# Patient Record
Sex: FEMALE | Race: WHITE | NOT HISPANIC OR LATINO | Employment: UNEMPLOYED | URBAN - METROPOLITAN AREA
[De-identification: names, ages, dates, MRNs, and addresses within clinical notes are randomized per-mention and may not be internally consistent; named-entity substitution may affect disease eponyms.]

---

## 2017-03-07 ENCOUNTER — CARE COORDINATION (OUTPATIENT)
Dept: CASE MANAGEMENT | Facility: CLINIC | Age: 22
End: 2017-03-07

## 2017-03-08 NOTE — PROGRESS NOTES
Clinic Care Coordination Contact  OUTREACH    Referral Information:  Referral Source: PCP  Reason for Contact: Initial, MH resources, OBGYN info   Care Conference: No     Universal Utilization:   ED Visits in last year: 0  Hospital visits in last year: 0  Last PCP appointment: 06/15/16  Missed Appointments: 3  Concerns: Proper Utilization  Multiple Providers or Specialists: NA    Clinical Concerns:  Current Medical Concerns:   Patient Active Problem List   Diagnosis     Exercise-induced asthma       Current Behavioral Concerns: NA    Education Provided to patient: [facility], [facility], [facility]BL[facility]IL[facility] Elias MD  Clinical Pathway: None    Medication Management:  Patient understands and is adherent-referral to psychiatry to manage ADHD     Functional Status:  Mobility Status: Independent  Equipment Currently Used at Home: none  Transportation: Pt was driving-DUI unsure of alternative transportation     Psychosocial:  Current living arrangement:: I live in a private home with family  Financial/Insurance: [email or web address]     Resources and Interventions:  Current Resources:  (NA);  (NA)  PAS Number:  (NA)  Senior Linkage Line Referral Placed:  (NA)  Advanced Care Plans/Directives on file:: Yes  Referrals Placed: Mental Health, Other (OBGYN)    Patient/Caregiver understanding: Pt was referred to care coordination to assist with finding a psychiatrist and OBGYN. Discussed ways to approach referrals and appointment set-up. Patient stated she was comfortable setting up the appointments independently provided the number for [facility] to see Dr. Dalila Elias, as indicated by PCP. Also, provided information for [facility] services if wanting to see a psychiatrist in the same clinic. Suggested patient utilize [facility] number to locate a psychiatrist. Pt expresses no additional needs at this time. Provided [facility] contact information and explained other services that CC  can help with.    Frequency of Care Coordination: as needed  Upcoming appointment:  (NA)     Plan: Pt denies any additional needs. NAHOMY CC will follow-up in 2-3 weeks to check on referral status.    Anabella Wood [facility]  Clinic Care Coordinator  [facility]/ [facility]  [phone]

## 2017-04-07 ENCOUNTER — CARE COORDINATION (OUTPATIENT)
Dept: CASE MANAGEMENT | Facility: CLINIC | Age: 22
End: 2017-04-07

## 2017-04-07 NOTE — PROGRESS NOTES
Clinic Care Coordination Contact    Check-in on provider referrals and appointment set-up. Pt reports that she has been super busy with work and hasn't had time to follow-up on the information provided. Pt has one more prescription refill for 4/17/17 and plans to follow-up with a psychiatrist after that. Encouraged pt to make an appointment sooner than later as it can take time to get in to see a psychiatrist. Pt stated understanding.  CC provided number and pt stated she woud call if she has any trouble or needs additional help.    Anabella Wood, \[facility]\""  Clinic Care Coordinator  [facility]/ [facility]  [phone]

## 2017-06-10 ENCOUNTER — HEALTH MAINTENANCE LETTER (OUTPATIENT)
Age: 22
End: 2017-06-10

## 2017-06-27 ENCOUNTER — OFFICE VISIT (OUTPATIENT)
Dept: MIDWIFE SERVICES | Facility: CLINIC | Age: 22
End: 2017-06-27
Payer: COMMERCIAL

## 2017-06-27 VITALS
WEIGHT: 137 LBS | BODY MASS INDEX: 22.82 KG/M2 | SYSTOLIC BLOOD PRESSURE: 116 MMHG | HEIGHT: 65 IN | DIASTOLIC BLOOD PRESSURE: 74 MMHG

## 2017-06-27 DIAGNOSIS — Z30.017 NEXPLANON INSERTION: Primary | ICD-10-CM

## 2017-06-27 DIAGNOSIS — Z30.46 NEXPLANON REMOVAL: ICD-10-CM

## 2017-06-27 DIAGNOSIS — Z11.8 SCREENING FOR CHLAMYDIAL DISEASE: ICD-10-CM

## 2017-06-27 DIAGNOSIS — F98.8 ATTENTION DEFICIT DISORDER, UNSPECIFIED HYPERACTIVITY PRESENCE: ICD-10-CM

## 2017-06-27 DIAGNOSIS — Z11.3 SCREENING EXAMINATION FOR VENEREAL DISEASE: ICD-10-CM

## 2017-06-27 DIAGNOSIS — Z32.02 PREGNANCY TEST NEGATIVE: ICD-10-CM

## 2017-06-27 DIAGNOSIS — Z01.812 PRE-PROCEDURE LAB EXAM: ICD-10-CM

## 2017-06-27 PROBLEM — Z86.59 H/O: DEPRESSION: Status: ACTIVE | Noted: 2017-06-27

## 2017-06-27 PROBLEM — F17.200 SMOKER: Status: ACTIVE | Noted: 2017-06-27

## 2017-06-27 LAB — BETA HCG QUAL IFA URINE: NEGATIVE

## 2017-06-27 PROCEDURE — 11983 REMOVE/INSERT DRUG IMPLANT: CPT | Performed by: ADVANCED PRACTICE MIDWIFE

## 2017-06-27 PROCEDURE — 87491 CHLMYD TRACH DNA AMP PROBE: CPT | Performed by: ADVANCED PRACTICE MIDWIFE

## 2017-06-27 PROCEDURE — 87591 N.GONORRHOEAE DNA AMP PROB: CPT | Performed by: ADVANCED PRACTICE MIDWIFE

## 2017-06-27 PROCEDURE — 84703 CHORIONIC GONADOTROPIN ASSAY: CPT | Performed by: ADVANCED PRACTICE MIDWIFE

## 2017-06-27 ASSESSMENT — PATIENT HEALTH QUESTIONNAIRE - PHQ9: 5. POOR APPETITE OR OVEREATING: SEVERAL DAYS

## 2017-06-27 ASSESSMENT — ANXIETY QUESTIONNAIRES
1. FEELING NERVOUS, ANXIOUS, OR ON EDGE: MORE THAN HALF THE DAYS
3. WORRYING TOO MUCH ABOUT DIFFERENT THINGS: MORE THAN HALF THE DAYS
2. NOT BEING ABLE TO STOP OR CONTROL WORRYING: SEVERAL DAYS
IF YOU CHECKED OFF ANY PROBLEMS ON THIS QUESTIONNAIRE, HOW DIFFICULT HAVE THESE PROBLEMS MADE IT FOR YOU TO DO YOUR WORK, TAKE CARE OF THINGS AT HOME, OR GET ALONG WITH OTHER PEOPLE: SOMEWHAT DIFFICULT
5. BEING SO RESTLESS THAT IT IS HARD TO SIT STILL: NOT AT ALL
GAD7 TOTAL SCORE: 9
7. FEELING AFRAID AS IF SOMETHING AWFUL MIGHT HAPPEN: SEVERAL DAYS
6. BECOMING EASILY ANNOYED OR IRRITABLE: MORE THAN HALF THE DAYS

## 2017-06-27 NOTE — MR AVS SNAPSHOT
"              After Visit Summary   6/27/2017    Alecia GIBSON Pauser    MRN: [id number]           Patient Information     Date Of Birth          1995        Visit Information        Provider Department      6/27/2017 10:00 AM Nora Ross APRN CNM [facility]        Today's Diagnoses     Nexplanon insertion    -  1    Nexplanon removal        Pre-procedure lab exam        Screening examination for venereal disease        Screening for chlamydial disease        Pregnancy test negative        Attention deficit disorder, unspecified hyperactivity presence           Follow-ups after your visit        Follow-up notes from your care team     Return in about 1 year (around 6/27/2018) for Routine Health Maintenance.      Who to contact     If you have questions or need follow up information about today's clinic visit or your schedule please contact [facility] directly at [phone].  Normal or non-critical lab and imaging results will be communicated to you by Gigi Hillhart, letter or phone within 4 business days after the clinic has received the results. If you do not hear from us within 7 days, please contact the clinic through MyChart or phone. If you have a critical or abnormal lab result, we will notify you by phone as soon as possible.  Submit refill requests through [employer] or call your pharmacy and they will forward the refill request to us. Please allow 3 business days for your refill to be completed.          Additional Information About Your Visit        MyChart Information     [employer] lets you send messages to your doctor, view your test results, renew your prescriptions, schedule appointments and more. To sign up, go to [email or web address] . Click on \"Log in\" on the left side of the screen, which will take you to the Welcome page. Then click on \"Sign up Now\" on the right side of the page.     You will be asked to enter the access code listed below, as " "well as some personal information. Please follow the directions to create your username and password.     Your access code is: [email or web address]  Expires: 2017  1:55 PM     Your access code will  in 90 days. If you need help or a new code, please call your [place] clinic or [phone].        Care EveryWhere ID     This is your Care EveryWhere ID. This could be used by other organizations to access your [place] medical records  [id number]        Your Vitals Were     Height BMI (Body Mass Index)                5' 4.5\" (1.638 m) 23.15 kg/m2           Blood Pressure from Last 3 Encounters:   17 116/74   06/17/15 110/72   14 102/74    Weight from Last 3 Encounters:   17 137 lb (62.1 kg)   06/17/15 127 lb (57.6 kg)   14 138 lb 12.8 oz (63 kg) (70 %)*     * Growth percentiles are based on [facility] 2-20 Years data.              We Performed the Following     Beta HCG qual IFA urine     Chlamydia trachomatis PCR     ETONOGESTREL IMPLANT SYSTEM     INSERTION NON-BIODEGRADABLE DRUG DELIVERY IMPLANT     Neisseria gonorrhoeae PCR     REMOVAL NON-BIODEGRADABLE DRUG DELIVERY IMPLANT          Today's Medication Changes          These changes are accurate as of: 17  1:55 PM.  If you have any questions, ask your nurse or doctor.               These medicines have changed or have updated prescriptions.        Dose/Directions    * etonogestrel 68 MG Impl   Commonly known as:  IMPLANON/NEXPLANON   This may have changed:  Another medication with the same name was added. Make sure you understand how and when to take each.   Changed by:  Tuan De Jesus MD        Dose:  1 each   1 each by Subdermal route once   Refills:  0       * etonogestrel 68 MG Impl   Commonly known as:  IMPLANON/NEXPLANON   This may have changed:  You were already taking a medication with the same name, and this prescription was added. Make sure you understand how and when to take each.   Used for:  Nexplanon insertion   Changed " by:  Nora Ross APRN CNM        Dose:  1 each   1 each (68 mg) by Subdermal route once for 1 dose   Quantity:  1 each   Refills:  0       * Notice:  This list has 2 medication(s) that are the same as other medications prescribed for you. Read the directions carefully, and ask your doctor or other care provider to review them with you.         Where to get your medicines      Some of these will need a paper prescription and others can be bought over the counter.  Ask your nurse if you have questions.     You don't need a prescription for these medications     etonogestrel 68 MG Impl                Primary Care Provider Office Phone # Fax #    Tuan De Jesus [phone]       [facility] [address]  [facility] [address]        Equal Access to Services     KELIN PATEL : Hadii aad ku hadasho Sotremayne, waaxda luqadaha, qaybta kaalmada adeegyada, zia prieto . So [facility] [phone].    ATENCIÓN: Si habla español, tiene a pereira disposición servicios gratuitos de asistencia lingüística. Llame al [phone].    We comply with applicable federal civil rights laws and [place] laws. We do not discriminate on the basis of race, color, national origin, age, disability sex, sexual orientation or gender identity.            Thank you!     Thank you for choosing [facility]  for your care. Our goal is always to provide you with excellent care. Hearing back from our patients is one way we can continue to improve our services. Please take a few minutes to complete the written survey that you may receive in the mail after your visit with us. Thank you!             Your Updated Medication List - Protect others around you: Learn how to safely use, store and throw away your medicines at www.disposemymeds.org.          This list is accurate as of: 6/27/17  1:55 PM.  Always use your most recent med list.                   Brand Name  Dispense Instructions for use Diagnosis    ADDERALL XR PO      Take 25 mg by mouth daily        * etonogestrel 68 MG Impl    IMPLANON/NEXPLANON     1 each by Subdermal route once        * etonogestrel 68 MG Impl    IMPLANON/NEXPLANON    1 each    1 each (68 mg) by Subdermal route once for 1 dose    Nexplanon insertion       MULTI-VITAMINS PO           * Notice:  This list has 2 medication(s) that are the same as other medications prescribed for you. Read the directions carefully, and ask your doctor or other care provider to review them with you.

## 2017-06-27 NOTE — PROGRESS NOTES
SUBJECTIVE:                                                   Alecia GIBSON Pauser is a 22 year old female who presents to clinic today for the following health issue(s):  Patient presents with:  Contraception: nexplanon removal and insertion. nexplanon  17      Additional information: patient had intercourse recently and didn't know her nexplanon was . Took neg upt at home a few days ago.     HPI:   Her current Nexplanon  2017, so she is doing a pregnancy test today which is negative.  Patient states her depression/anxiety is stable, has not gotten worse since being on Nexplanon.  Is not on meds, does take Adderall for ADD.  Is not in counseling, feels like she is coping well.  Works as a  and likes her job.  Continues to smoke cigs 1/2PPD    No LMP recorded. Patient has had an implant..   Patient is sexually active, No obstetric history on file..  Using nexplanon for contraception.    reports that she has been smoking Cigarettes.  She started smoking about 5 years ago. She has been smoking about 0.50 packs per day. She has never used smokeless tobacco.  Tobacco Cessation Action Plan: Information offered: Patient not interested at this time  STD testing offered?  Yes, accepts    Health maintenance updated:  yes  Today's PHQ-9 Score:   PHQ-9 SCORE 2017   Total Score -   Total Score 7     Today's IVON-7 Score:   IVON-7 SCORE 2017   Total Score 9       Problem list and histories reviewed & adjusted, as indicated.  Additional history: as documented.    Patient Active Problem List   Diagnosis     Exercise-induced asthma     No past surgical history on file.   Social History   Substance Use Topics     Smoking status: Current Every Day Smoker     Packs/day: 0.50     Types: Cigarettes     Start date: 2012     Smokeless tobacco: Never Used     Alcohol use 0.5 oz/week     1 Standard drinks or equivalent per week           Current Outpatient Prescriptions   Medication Sig  "    Multiple Vitamin (MULTI-VITAMINS PO)      etonogestrel (IMPLANON/NEXPLANON) 68 MG IMPL 1 each by Subdermal route once     Amphetamine-Dextroamphetamine (ADDERALL XR PO) Take 25 mg by mouth daily      No current facility-administered medications for this visit.      Allergies   Allergen Reactions     Seasonal Allergies        ROS:  12 point review of systems negative other than symptoms noted below.  Genitourinary: Irregular Menses and Vaginal Discharge  Skin: Acne and Rash    OBJECTIVE:     /74  Ht 5' 4.5\" (1.638 m)  Wt 137 lb (62.1 kg)  BMI 23.15 kg/m2  Body mass index is 23.15 kg/(m^2).    Exam:  Constitutional:  Appearance: Well nourished, well developed alert, in no acute distress     In-Clinic Test Results:  No results found for this or any previous visit (from the past 24 hour(s)).    ASSESSMENT/PLAN:                                                        ICD-10-CM    1. Nexplanon insertion Z30.017 etonogestrel (IMPLANON/NEXPLANON) 68 MG IMPL     INSERTION NON-BIODEGRADABLE DRUG DELIVERY IMPLANT     ETONOGESTREL IMPLANT SYSTEM   2. Nexplanon removal Z30.46 REMOVAL NON-BIODEGRADABLE DRUG DELIVERY IMPLANT       INDICATIONS:                                                      Alecia is here today for removal of Nexplanon contraceptive implant.     Is a pregnancy test required: Yes.  Was it positive or negative?  Negative  Was a consent obtained?  Yes      PROCEDURE:                                                      Patient was placed in dorsal supine position with left arm abducted and externally rotated. Nexplanon was palpated under skin. The area was cleansed with betadine. The distal site was injected with 3 ml of 1% plain lidocaine with epi.  While pushing down on the proximal end, 2 mm incision was made over the distal implant with a scalpel. The implant was grasped with a mosquito forceps and removed intact. The skin was closed with Steristrip. A pressure bandage was placed for the next " 12-24 hours.  She tolerated the procedure well with minimal discomfort. There were no complications. Patient was discharged in stable condition.    Call if bleeding, pain or fever occur.    SHAWN Camejo CNM      INDICATIONS:                                                      Patient presents for placement of Nexplanon for contraception.  She has had been counseled on side effects, risks, benefits and alternatives.  Patient desires to proceed.    Verification of Procedure:  Just before the procedure begans through verbal and active participation of team members, I verified:     Initials   Patient Name KM   Patient  KM   Procedure to be performed KM     Consent:  Risks, benefits of treatment, and alternative options for contraception were discussed.  Patient's questions were elicited and answered.  Written consent was obtained and scanned into medical record.       PROCEDURE:                                                      Patient was resting comfortably on exam table, left arm placed at shoulder level in a 90 degree angle.    Site from removal used for insertion.  Site cleaned again with betadine.  Nexplanon device visualized in applicator by patient and provider.  Skin punctured with applicator at insertion site and advanced with ease in the subdermal space.  Applicator was removed.  Nexplanon was palpated by provider and patient.    Small amount of bleeding noted at insertion site and no bruising noted along track of Nexplanon.  Bandage and pressure dressing applied to insertion site.         POST PROCEDURE:                                                      To be removed/replaced within three years. Written and verbal instructions provided to patient.  She tolerated the procedure well with minimal discomfort. There were no complications. Patient was discharged in stable condition.    Keep dressing on and dry for 24 hours, then remove wrap.  Replace bandaid daily for 5 days. Keep your user  card in a safe place where you'll remember it.  Make note of today's date for removal/replacement of your Nexplanon in 3 years. Call us if there is any redness, tenderness, warmth or drainage from the area of your Nexplanon insertion. Use a backup method of birth control for 7 days.    NDC 2510-7251-92  LOT [id number]  EXP 8/2019    Patient encouraged to continue to work on quitting smoking.    SHAWN Camejo CNM

## 2017-06-28 LAB
C TRACH DNA SPEC QL NAA+PROBE: NORMAL
N GONORRHOEA DNA SPEC QL NAA+PROBE: NORMAL
SPECIMEN SOURCE: NORMAL
SPECIMEN SOURCE: NORMAL

## 2017-06-28 ASSESSMENT — PATIENT HEALTH QUESTIONNAIRE - PHQ9: SUM OF ALL RESPONSES TO PHQ QUESTIONS 1-9: 7

## 2017-06-28 ASSESSMENT — ANXIETY QUESTIONNAIRES: GAD7 TOTAL SCORE: 9

## 2018-05-07 ENCOUNTER — OFFICE VISIT - HEALTHEAST (OUTPATIENT)
Dept: FAMILY MEDICINE | Facility: CLINIC | Age: 23
End: 2018-05-07

## 2018-05-07 DIAGNOSIS — F90.9 ATTENTION DEFICIT HYPERACTIVITY DISORDER: ICD-10-CM

## 2018-05-07 DIAGNOSIS — F41.1 GENERALIZED ANXIETY DISORDER: ICD-10-CM

## 2018-05-07 ASSESSMENT — MIFFLIN-ST. JEOR: SCORE: 1366.27

## 2018-07-02 ENCOUNTER — COMMUNICATION - HEALTHEAST (OUTPATIENT)
Dept: FAMILY MEDICINE | Facility: CLINIC | Age: 23
End: 2018-07-02

## 2018-07-02 ENCOUNTER — COMMUNICATION - HEALTHEAST (OUTPATIENT)
Dept: SCHEDULING | Facility: CLINIC | Age: 23
End: 2018-07-02

## 2018-07-26 ENCOUNTER — OFFICE VISIT - HEALTHEAST (OUTPATIENT)
Dept: FAMILY MEDICINE | Facility: CLINIC | Age: 23
End: 2018-07-26

## 2018-07-26 DIAGNOSIS — L70.9 ACNE: ICD-10-CM

## 2018-07-26 DIAGNOSIS — F41.9 ANXIETY: ICD-10-CM

## 2018-07-26 DIAGNOSIS — Z12.4 SCREENING FOR CERVICAL CANCER: ICD-10-CM

## 2018-07-26 DIAGNOSIS — Z00.00 ROUTINE GENERAL MEDICAL EXAMINATION AT A HEALTH CARE FACILITY: ICD-10-CM

## 2018-07-26 DIAGNOSIS — F90.9 ADHD (ATTENTION DEFICIT HYPERACTIVITY DISORDER): ICD-10-CM

## 2018-08-29 ENCOUNTER — COMMUNICATION - HEALTHEAST (OUTPATIENT)
Dept: FAMILY MEDICINE | Facility: CLINIC | Age: 23
End: 2018-08-29

## 2018-10-22 ENCOUNTER — COMMUNICATION - HEALTHEAST (OUTPATIENT)
Dept: FAMILY MEDICINE | Facility: CLINIC | Age: 23
End: 2018-10-22

## 2018-10-23 ENCOUNTER — COMMUNICATION - HEALTHEAST (OUTPATIENT)
Dept: FAMILY MEDICINE | Facility: CLINIC | Age: 23
End: 2018-10-23

## 2018-10-23 DIAGNOSIS — F90.9 ADHD (ATTENTION DEFICIT HYPERACTIVITY DISORDER): ICD-10-CM

## 2018-12-27 ENCOUNTER — OFFICE VISIT - HEALTHEAST (OUTPATIENT)
Dept: FAMILY MEDICINE | Facility: CLINIC | Age: 23
End: 2018-12-27

## 2018-12-27 DIAGNOSIS — L70.9 ACNE: ICD-10-CM

## 2018-12-27 DIAGNOSIS — F41.9 ANXIETY: ICD-10-CM

## 2018-12-27 DIAGNOSIS — F90.9 ADHD (ATTENTION DEFICIT HYPERACTIVITY DISORDER): ICD-10-CM

## 2018-12-27 LAB
AMPHETAMINES UR QL SCN: ABNORMAL
BARBITURATES UR QL: ABNORMAL
BENZODIAZ UR QL: ABNORMAL
CANNABINOIDS UR QL SCN: ABNORMAL
COCAINE UR QL: ABNORMAL
CREAT UR-MCNC: 137.2 MG/DL
METHADONE UR QL SCN: ABNORMAL
OPIATES UR QL SCN: ABNORMAL
OXYCODONE UR QL: ABNORMAL
PCP UR QL SCN: ABNORMAL

## 2018-12-31 ENCOUNTER — COMMUNICATION - HEALTHEAST (OUTPATIENT)
Dept: FAMILY MEDICINE | Facility: CLINIC | Age: 23
End: 2018-12-31

## 2018-12-31 DIAGNOSIS — L70.0 ACNE VULGARIS: ICD-10-CM

## 2019-04-22 ENCOUNTER — COMMUNICATION - HEALTHEAST (OUTPATIENT)
Dept: FAMILY MEDICINE | Facility: CLINIC | Age: 24
End: 2019-04-22

## 2019-04-22 DIAGNOSIS — F90.9 ADHD (ATTENTION DEFICIT HYPERACTIVITY DISORDER): ICD-10-CM

## 2019-06-11 ENCOUNTER — COMMUNICATION - HEALTHEAST (OUTPATIENT)
Dept: FAMILY MEDICINE | Facility: CLINIC | Age: 24
End: 2019-06-11

## 2019-06-13 ENCOUNTER — COMMUNICATION - HEALTHEAST (OUTPATIENT)
Dept: FAMILY MEDICINE | Facility: CLINIC | Age: 24
End: 2019-06-13

## 2019-06-18 ENCOUNTER — COMMUNICATION - HEALTHEAST (OUTPATIENT)
Dept: FAMILY MEDICINE | Facility: CLINIC | Age: 24
End: 2019-06-18

## 2019-06-21 ENCOUNTER — COMMUNICATION - HEALTHEAST (OUTPATIENT)
Dept: FAMILY MEDICINE | Facility: CLINIC | Age: 24
End: 2019-06-21

## 2019-06-21 DIAGNOSIS — L70.9 ACNE: ICD-10-CM

## 2019-06-25 ENCOUNTER — OFFICE VISIT - HEALTHEAST (OUTPATIENT)
Dept: FAMILY MEDICINE | Facility: CLINIC | Age: 24
End: 2019-06-25

## 2019-06-25 DIAGNOSIS — Z30.09 GENERAL COUNSELLING AND ADVICE ON CONTRACEPTION: ICD-10-CM

## 2019-06-25 DIAGNOSIS — F90.9 ADHD (ATTENTION DEFICIT HYPERACTIVITY DISORDER): ICD-10-CM

## 2019-06-25 ASSESSMENT — MIFFLIN-ST. JEOR: SCORE: 1358.33

## 2019-10-17 ENCOUNTER — COMMUNICATION - HEALTHEAST (OUTPATIENT)
Dept: FAMILY MEDICINE | Facility: CLINIC | Age: 24
End: 2019-10-17

## 2019-10-17 DIAGNOSIS — F90.9 ADHD (ATTENTION DEFICIT HYPERACTIVITY DISORDER): ICD-10-CM

## 2019-11-08 ENCOUNTER — COMMUNICATION - HEALTHEAST (OUTPATIENT)
Dept: FAMILY MEDICINE | Facility: CLINIC | Age: 24
End: 2019-11-08

## 2019-11-11 ENCOUNTER — COMMUNICATION - HEALTHEAST (OUTPATIENT)
Dept: FAMILY MEDICINE | Facility: CLINIC | Age: 24
End: 2019-11-11

## 2019-11-16 ENCOUNTER — COMMUNICATION - HEALTHEAST (OUTPATIENT)
Dept: FAMILY MEDICINE | Facility: CLINIC | Age: 24
End: 2019-11-16

## 2019-11-16 DIAGNOSIS — L70.9 ACNE: ICD-10-CM

## 2019-11-18 ENCOUNTER — COMMUNICATION - HEALTHEAST (OUTPATIENT)
Dept: SCHEDULING | Facility: CLINIC | Age: 24
End: 2019-11-18

## 2019-11-18 ENCOUNTER — COMMUNICATION - HEALTHEAST (OUTPATIENT)
Dept: FAMILY MEDICINE | Facility: CLINIC | Age: 24
End: 2019-11-18

## 2019-11-18 DIAGNOSIS — F90.9 ADHD (ATTENTION DEFICIT HYPERACTIVITY DISORDER): ICD-10-CM

## 2019-12-05 ENCOUNTER — COMMUNICATION - HEALTHEAST (OUTPATIENT)
Dept: FAMILY MEDICINE | Facility: CLINIC | Age: 24
End: 2019-12-05

## 2019-12-12 ENCOUNTER — OFFICE VISIT - HEALTHEAST (OUTPATIENT)
Dept: OBGYN | Facility: CLINIC | Age: 24
End: 2019-12-12

## 2019-12-12 ENCOUNTER — OFFICE VISIT - HEALTHEAST (OUTPATIENT)
Dept: FAMILY MEDICINE | Facility: CLINIC | Age: 24
End: 2019-12-12

## 2019-12-12 ENCOUNTER — COMMUNICATION - HEALTHEAST (OUTPATIENT)
Dept: FAMILY MEDICINE | Facility: CLINIC | Age: 24
End: 2019-12-12

## 2019-12-12 DIAGNOSIS — J01.00 ACUTE NON-RECURRENT MAXILLARY SINUSITIS: ICD-10-CM

## 2019-12-12 DIAGNOSIS — Z30.09 ENCOUNTER FOR OTHER GENERAL COUNSELING OR ADVICE ON CONTRACEPTION: ICD-10-CM

## 2019-12-12 DIAGNOSIS — Z30.46 ENCOUNTER FOR NEXPLANON REMOVAL: ICD-10-CM

## 2019-12-12 DIAGNOSIS — F41.1 GAD (GENERALIZED ANXIETY DISORDER): ICD-10-CM

## 2019-12-12 DIAGNOSIS — F90.9 ADHD (ATTENTION DEFICIT HYPERACTIVITY DISORDER): ICD-10-CM

## 2019-12-12 ASSESSMENT — ANXIETY QUESTIONNAIRES
1. FEELING NERVOUS, ANXIOUS, OR ON EDGE: MORE THAN HALF THE DAYS
IF YOU CHECKED OFF ANY PROBLEMS ON THIS QUESTIONNAIRE, HOW DIFFICULT HAVE THESE PROBLEMS MADE IT FOR YOU TO DO YOUR WORK, TAKE CARE OF THINGS AT HOME, OR GET ALONG WITH OTHER PEOPLE: SOMEWHAT DIFFICULT
5. BEING SO RESTLESS THAT IT IS HARD TO SIT STILL: SEVERAL DAYS
2. NOT BEING ABLE TO STOP OR CONTROL WORRYING: NEARLY EVERY DAY
6. BECOMING EASILY ANNOYED OR IRRITABLE: MORE THAN HALF THE DAYS
3. WORRYING TOO MUCH ABOUT DIFFERENT THINGS: NEARLY EVERY DAY
4. TROUBLE RELAXING: MORE THAN HALF THE DAYS
GAD7 TOTAL SCORE: 15
7. FEELING AFRAID AS IF SOMETHING AWFUL MIGHT HAPPEN: MORE THAN HALF THE DAYS

## 2019-12-12 ASSESSMENT — MIFFLIN-ST. JEOR: SCORE: 1382.14

## 2019-12-12 ASSESSMENT — PATIENT HEALTH QUESTIONNAIRE - PHQ9: SUM OF ALL RESPONSES TO PHQ QUESTIONS 1-9: 14

## 2020-01-16 ENCOUNTER — OFFICE VISIT - HEALTHEAST (OUTPATIENT)
Dept: FAMILY MEDICINE | Facility: CLINIC | Age: 25
End: 2020-01-16

## 2020-01-16 DIAGNOSIS — F41.1 GAD (GENERALIZED ANXIETY DISORDER): ICD-10-CM

## 2020-01-16 DIAGNOSIS — L70.9 ACNE: ICD-10-CM

## 2020-01-16 DIAGNOSIS — F90.0 ATTENTION DEFICIT HYPERACTIVITY DISORDER (ADHD), PREDOMINANTLY INATTENTIVE TYPE: ICD-10-CM

## 2020-01-16 LAB
AMPHETAMINES UR QL SCN: ABNORMAL
BARBITURATES UR QL: ABNORMAL
BENZODIAZ UR QL: ABNORMAL
CANNABINOIDS UR QL SCN: ABNORMAL
COCAINE UR QL: ABNORMAL
CREAT UR-MCNC: 164.7 MG/DL
METHADONE UR QL SCN: ABNORMAL
OPIATES UR QL SCN: ABNORMAL
OXYCODONE UR QL: ABNORMAL
PCP UR QL SCN: ABNORMAL

## 2020-01-16 ASSESSMENT — ANXIETY QUESTIONNAIRES
2. NOT BEING ABLE TO STOP OR CONTROL WORRYING: SEVERAL DAYS
1. FEELING NERVOUS, ANXIOUS, OR ON EDGE: SEVERAL DAYS
3. WORRYING TOO MUCH ABOUT DIFFERENT THINGS: MORE THAN HALF THE DAYS
5. BEING SO RESTLESS THAT IT IS HARD TO SIT STILL: SEVERAL DAYS
IF YOU CHECKED OFF ANY PROBLEMS ON THIS QUESTIONNAIRE, HOW DIFFICULT HAVE THESE PROBLEMS MADE IT FOR YOU TO DO YOUR WORK, TAKE CARE OF THINGS AT HOME, OR GET ALONG WITH OTHER PEOPLE: SOMEWHAT DIFFICULT
6. BECOMING EASILY ANNOYED OR IRRITABLE: NOT AT ALL
7. FEELING AFRAID AS IF SOMETHING AWFUL MIGHT HAPPEN: NOT AT ALL
GAD7 TOTAL SCORE: 6
4. TROUBLE RELAXING: SEVERAL DAYS

## 2020-01-16 ASSESSMENT — PATIENT HEALTH QUESTIONNAIRE - PHQ9: SUM OF ALL RESPONSES TO PHQ QUESTIONS 1-9: 10

## 2020-01-16 ASSESSMENT — MIFFLIN-ST. JEOR: SCORE: 1388.95

## 2020-01-21 LAB
AMPHET UR-MCNC: 769 NG/ML
MDA UR-MCNC: <200 NG/ML
MDEA UR-MCNC: <200 NG/ML
MDMA UR-MCNC: <200 NG/ML
METHAMPHET UR-MCNC: <200 NG/ML
PHENTERMINE UR CFM-MCNC: <200 NG/ML

## 2020-03-02 ENCOUNTER — COMMUNICATION - HEALTHEAST (OUTPATIENT)
Dept: FAMILY MEDICINE | Facility: CLINIC | Age: 25
End: 2020-03-02

## 2020-05-07 ENCOUNTER — OFFICE VISIT - HEALTHEAST (OUTPATIENT)
Dept: FAMILY MEDICINE | Facility: CLINIC | Age: 25
End: 2020-05-07

## 2020-05-07 ENCOUNTER — COMMUNICATION - HEALTHEAST (OUTPATIENT)
Dept: FAMILY MEDICINE | Facility: CLINIC | Age: 25
End: 2020-05-07

## 2020-05-07 DIAGNOSIS — F90.0 ATTENTION DEFICIT HYPERACTIVITY DISORDER (ADHD), PREDOMINANTLY INATTENTIVE TYPE: ICD-10-CM

## 2020-05-07 DIAGNOSIS — F41.9 ANXIETY: ICD-10-CM

## 2020-05-07 ASSESSMENT — ANXIETY QUESTIONNAIRES
6. BECOMING EASILY ANNOYED OR IRRITABLE: SEVERAL DAYS
7. FEELING AFRAID AS IF SOMETHING AWFUL MIGHT HAPPEN: SEVERAL DAYS
2. NOT BEING ABLE TO STOP OR CONTROL WORRYING: NOT AT ALL
1. FEELING NERVOUS, ANXIOUS, OR ON EDGE: SEVERAL DAYS
4. TROUBLE RELAXING: SEVERAL DAYS
5. BEING SO RESTLESS THAT IT IS HARD TO SIT STILL: MORE THAN HALF THE DAYS
GAD7 TOTAL SCORE: 8
3. WORRYING TOO MUCH ABOUT DIFFERENT THINGS: MORE THAN HALF THE DAYS

## 2020-05-07 ASSESSMENT — PATIENT HEALTH QUESTIONNAIRE - PHQ9: SUM OF ALL RESPONSES TO PHQ QUESTIONS 1-9: 6

## 2020-08-11 ENCOUNTER — COMMUNICATION - HEALTHEAST (OUTPATIENT)
Dept: FAMILY MEDICINE | Facility: CLINIC | Age: 25
End: 2020-08-11

## 2020-08-11 DIAGNOSIS — F90.0 ATTENTION DEFICIT HYPERACTIVITY DISORDER (ADHD), PREDOMINANTLY INATTENTIVE TYPE: ICD-10-CM

## 2020-09-08 ENCOUNTER — COMMUNICATION - HEALTHEAST (OUTPATIENT)
Dept: SCHEDULING | Facility: CLINIC | Age: 25
End: 2020-09-08

## 2020-09-08 DIAGNOSIS — F90.0 ATTENTION DEFICIT HYPERACTIVITY DISORDER (ADHD), PREDOMINANTLY INATTENTIVE TYPE: ICD-10-CM

## 2020-09-09 ENCOUNTER — COMMUNICATION - HEALTHEAST (OUTPATIENT)
Dept: FAMILY MEDICINE | Facility: CLINIC | Age: 25
End: 2020-09-09

## 2020-09-09 DIAGNOSIS — F90.0 ATTENTION DEFICIT HYPERACTIVITY DISORDER (ADHD), PREDOMINANTLY INATTENTIVE TYPE: ICD-10-CM

## 2020-09-11 ENCOUNTER — COMMUNICATION - HEALTHEAST (OUTPATIENT)
Dept: FAMILY MEDICINE | Facility: CLINIC | Age: 25
End: 2020-09-11

## 2020-10-19 ENCOUNTER — COMMUNICATION - HEALTHEAST (OUTPATIENT)
Dept: FAMILY MEDICINE | Facility: CLINIC | Age: 25
End: 2020-10-19

## 2020-10-19 DIAGNOSIS — F90.0 ATTENTION DEFICIT HYPERACTIVITY DISORDER (ADHD), PREDOMINANTLY INATTENTIVE TYPE: ICD-10-CM

## 2020-11-19 ENCOUNTER — COMMUNICATION - HEALTHEAST (OUTPATIENT)
Dept: FAMILY MEDICINE | Facility: CLINIC | Age: 25
End: 2020-11-19

## 2020-11-19 DIAGNOSIS — F90.0 ATTENTION DEFICIT HYPERACTIVITY DISORDER (ADHD), PREDOMINANTLY INATTENTIVE TYPE: ICD-10-CM

## 2020-12-14 ENCOUNTER — OFFICE VISIT - HEALTHEAST (OUTPATIENT)
Dept: FAMILY MEDICINE | Facility: CLINIC | Age: 25
End: 2020-12-14

## 2020-12-14 DIAGNOSIS — F90.0 ATTENTION DEFICIT HYPERACTIVITY DISORDER (ADHD), PREDOMINANTLY INATTENTIVE TYPE: ICD-10-CM

## 2020-12-14 DIAGNOSIS — F41.9 ANXIETY: ICD-10-CM

## 2020-12-14 DIAGNOSIS — Z30.09 GENERAL COUNSELING FOR PRESCRIPTION OF ORAL CONTRACEPTIVES: ICD-10-CM

## 2021-03-25 ENCOUNTER — COMMUNICATION - HEALTHEAST (OUTPATIENT)
Dept: ADMINISTRATIVE | Facility: CLINIC | Age: 26
End: 2021-03-25

## 2021-03-25 DIAGNOSIS — F90.0 ATTENTION DEFICIT HYPERACTIVITY DISORDER (ADHD), PREDOMINANTLY INATTENTIVE TYPE: ICD-10-CM

## 2021-04-20 ENCOUNTER — OFFICE VISIT - HEALTHEAST (OUTPATIENT)
Dept: FAMILY MEDICINE | Facility: CLINIC | Age: 26
End: 2021-04-20

## 2021-04-20 ENCOUNTER — RECORDS - HEALTHEAST (OUTPATIENT)
Dept: GENERAL RADIOLOGY | Facility: CLINIC | Age: 26
End: 2021-04-20

## 2021-04-20 DIAGNOSIS — M89.8X1 CLAVICLE PAIN: ICD-10-CM

## 2021-04-20 DIAGNOSIS — R59.1 LYMPHADENOPATHY: ICD-10-CM

## 2021-04-20 DIAGNOSIS — M89.8X1 OTHER SPECIFIED DISORDERS OF BONE, SHOULDER: ICD-10-CM

## 2021-04-20 DIAGNOSIS — T50.Z95A VACCINE REACTION, INITIAL ENCOUNTER: ICD-10-CM

## 2021-04-20 ASSESSMENT — MIFFLIN-ST. JEOR: SCORE: 1488.18

## 2021-04-22 ENCOUNTER — COMMUNICATION - HEALTHEAST (OUTPATIENT)
Dept: FAMILY MEDICINE | Facility: CLINIC | Age: 26
End: 2021-04-22

## 2021-05-24 ENCOUNTER — OFFICE VISIT - HEALTHEAST (OUTPATIENT)
Dept: FAMILY MEDICINE | Facility: CLINIC | Age: 26
End: 2021-05-24

## 2021-05-24 DIAGNOSIS — N39.41 URGE INCONTINENCE OF URINE: ICD-10-CM

## 2021-05-24 DIAGNOSIS — F90.0 ATTENTION DEFICIT HYPERACTIVITY DISORDER (ADHD), PREDOMINANTLY INATTENTIVE TYPE: ICD-10-CM

## 2021-05-24 DIAGNOSIS — Z31.41 FERTILITY TESTING: ICD-10-CM

## 2021-05-24 DIAGNOSIS — Z72.0 TOBACCO ABUSE: ICD-10-CM

## 2021-05-24 LAB
ALBUMIN UR-MCNC: NEGATIVE G/DL
APPEARANCE UR: CLEAR
BACTERIA #/AREA URNS HPF: NORMAL /[HPF]
BILIRUB UR QL STRIP: NEGATIVE
COLOR UR AUTO: YELLOW
GLUCOSE UR STRIP-MCNC: NEGATIVE MG/DL
HGB UR QL STRIP: NEGATIVE
KETONES UR STRIP-MCNC: NEGATIVE MG/DL
LEUKOCYTE ESTERASE UR QL STRIP: NEGATIVE
NITRATE UR QL: NEGATIVE
PH UR STRIP: 5.5 [PH] (ref 5–8)
RBC #/AREA URNS AUTO: NORMAL HPF
SP GR UR STRIP: 1.01 (ref 1–1.03)
SQUAMOUS #/AREA URNS AUTO: NORMAL LPF
UROBILINOGEN UR STRIP-ACNC: NORMAL
WBC #/AREA URNS AUTO: NORMAL HPF

## 2021-05-24 ASSESSMENT — ANXIETY QUESTIONNAIRES
2. NOT BEING ABLE TO STOP OR CONTROL WORRYING: SEVERAL DAYS
1. FEELING NERVOUS, ANXIOUS, OR ON EDGE: SEVERAL DAYS
4. TROUBLE RELAXING: NOT AT ALL
6. BECOMING EASILY ANNOYED OR IRRITABLE: SEVERAL DAYS
7. FEELING AFRAID AS IF SOMETHING AWFUL MIGHT HAPPEN: NOT AT ALL
5. BEING SO RESTLESS THAT IT IS HARD TO SIT STILL: NOT AT ALL
3. WORRYING TOO MUCH ABOUT DIFFERENT THINGS: SEVERAL DAYS
GAD7 TOTAL SCORE: 4
IF YOU CHECKED OFF ANY PROBLEMS ON THIS QUESTIONNAIRE, HOW DIFFICULT HAVE THESE PROBLEMS MADE IT FOR YOU TO DO YOUR WORK, TAKE CARE OF THINGS AT HOME, OR GET ALONG WITH OTHER PEOPLE: NOT DIFFICULT AT ALL

## 2021-05-24 ASSESSMENT — PATIENT HEALTH QUESTIONNAIRE - PHQ9: SUM OF ALL RESPONSES TO PHQ QUESTIONS 1-9: 3

## 2021-05-24 ASSESSMENT — MIFFLIN-ST. JEOR: SCORE: 1473.44

## 2021-05-25 LAB
AMPHETAMINES UR QL SCN: ABNORMAL
BARBITURATES UR QL: ABNORMAL
BENZODIAZ UR QL: ABNORMAL
CANNABINOIDS UR QL SCN: ABNORMAL
COCAINE UR QL: ABNORMAL
CREAT UR-MCNC: 34.1 MG/DL
METHADONE UR QL SCN: ABNORMAL
OPIATES UR QL SCN: ABNORMAL
OXYCODONE UR QL: ABNORMAL
PCP UR QL SCN: ABNORMAL

## 2021-05-26 ASSESSMENT — PATIENT HEALTH QUESTIONNAIRE - PHQ9: SUM OF ALL RESPONSES TO PHQ QUESTIONS 1-9: 14

## 2021-05-27 ASSESSMENT — PATIENT HEALTH QUESTIONNAIRE - PHQ9
SUM OF ALL RESPONSES TO PHQ QUESTIONS 1-9: 6
SUM OF ALL RESPONSES TO PHQ QUESTIONS 1-9: 10

## 2021-05-28 ASSESSMENT — ANXIETY QUESTIONNAIRES
GAD7 TOTAL SCORE: 15
GAD7 TOTAL SCORE: 6
GAD7 TOTAL SCORE: 8

## 2021-05-28 NOTE — TELEPHONE ENCOUNTER
Pt requesting refill. Will you fill the Adderall XR 30 mg? Last CSA was on 7/26/18.  Due for a visit in June. Last OFV was in 12/2018.

## 2021-05-29 NOTE — TELEPHONE ENCOUNTER
RN cannot approve Refill Request    RN can NOT refill this medication med is not covered by policy/route to provider. Last office visit: 12/27/2018 Deepti Swan CNP Last Physical: Visit date not found Last MTM visit: Visit date not found Last visit same specialty: 12/27/2018 Deepti Swan CNP.  Next visit within 3 mo: Visit date not found  Next physical within 3 mo: Visit date not found      Angeli Almaraz, Care Connection Triage/Med Refill 6/23/2019    Requested Prescriptions   Pending Prescriptions Disp Refills     clindamycin (CLEOCIN T) 1 % lotion [Pharmacy Med Name: CLINDAMYCIN 1% LOTION 60ML]  0     Sig: APPLY TOPICALLY TO THE FACE EVERY MORNING       There is no refill protocol information for this order

## 2021-05-30 NOTE — PROGRESS NOTES
Assessment & Plan   1. ADHD (attention deficit hyperactivity disorder)  Inattention symptoms remain well controlled on Adderall XR 30 mg.  Medication refilled for 3 months I will plan to recheck in 6 months.  CSA updated today.  Urine drug screen is up-to-date as well.  - dextroamphetamine-amphetamine (ADDERALL XR) 30 MG 24 hr capsule; Take 1 capsule (30 mg total) by mouth daily.  Dispense: 30 capsule; Refill: 0  - dextroamphetamine-amphetamine (ADDERALL XR) 30 MG 24 hr capsule; Take 1 capsule (30 mg total) by mouth daily.  Dispense: 30 capsule; Refill: 0  - dextroamphetamine-amphetamine (ADDERALL XR) 30 MG 24 hr capsule; Take 1 capsule (30 mg total) by mouth daily.  Dispense: 30 capsule; Refill: 0    2. General counselling and advice on contraception  Discussed contraceptive options for her and specifically Depo and IUDs that she has not tolerated combined oral contraceptives in the past.  She is intending to remove the Nexplanon at some point within the next year.  Specifically interested in Depo and advised that she could start this on the day of the Nexplanon removal.  Counseled on potential side effects and risks.  She will contact me if she has any further questions.    Deepti Swan CNP    Subjective   Chief Complaint:  ADHD and Contraception (would like to discuss different birth control options for once she gets her nexplanon removed)    HPI:   Alecia GIBSON Pauser is a 24 y.o. female who presents for ADHD and contraception.      ADHD:  Continues on Adderall XR 30mg.  She continues to feel this works well for her to control her inattention symptoms.  New job upcoming working for [employer]. she is quite excited about this.  H/o anxiety as well.  She feels this continues to be well controlled without medication.    Contraception: Currently Nexplanon in place.  States this was placed 2 years ago.  Initially she had a favorable bleeding pattern that is now experiencing episodic bleeding without pattern.  She is also  "noticed some bloating and weight gain.  She is considering changing contraceptive methods and would like to discuss other options.  Previously with OCP she also experienced bloating and weight gain.  Also had difficulty taking on a daily basis.    Smoking cessation:  She has now quit smoking for almost three months! Using e-cigarette. Rare cravings.     Allergies:  has No Known Allergies.    SH/FH:  Social History and Family History reviewed and updated.   Tobacco Status:  She  reports that she quit smoking about 2 months ago. Her smoking use included cigarettes. She has never used smokeless tobacco.    Review of Systems:  A complete head to toe ROS is negative unless otherwise noted in HPI    Objective     Vitals:    06/25/19 1342   BP: 120/72   Patient Site: Right Arm   Patient Position: Sitting   Cuff Size: Adult Regular   Pulse: 68   Weight: 135 lb 4 oz (61.3 kg)   Height: 5' 5.25\" (1.657 m)       Physical Exam:  GENERAL: Alert, well-appearing female .   PSYCH: Pleasant mood, affect appropriate.  Good judgment and insight.  Intact recent and remote memory.  Good eye contact.    "

## 2021-06-01 VITALS — HEIGHT: 65 IN | BODY MASS INDEX: 22.82 KG/M2 | WEIGHT: 137 LBS

## 2021-06-01 VITALS — BODY MASS INDEX: 22.05 KG/M2 | WEIGHT: 133.5 LBS

## 2021-06-02 VITALS — BODY MASS INDEX: 22.79 KG/M2 | WEIGHT: 138 LBS

## 2021-06-02 NOTE — TELEPHONE ENCOUNTER
Controlled Substance Refill Request  Medication Name:   Requested Prescriptions     Pending Prescriptions Disp Refills     dextroamphetamine-amphetamine (ADDERALL XR) 30 MG 24 hr capsule 30 capsule 0     Sig: Take 1 capsule (30 mg total) by mouth daily.     Date Last Fill: 9/19/19  Pharmacy: [id number]      Submit electronically to pharmacy  Controlled Substance Agreement Date Scanned:   Encounter-Level CSA Scan Date:    There are no encounter-level csa scan date.       Last office visit with prescriber/PCP: 6/25/2019 Deepti Swan CNP OR same dept: 6/25/2019 Deepti Swan CNP OR same specialty: 6/25/2019 Deepti Swan CNP  Last physical: Visit date not found Last MTM visit: Visit date not found          Patient stated that she has enough to last until Saturday.

## 2021-06-03 VITALS
OXYGEN SATURATION: 99 % | TEMPERATURE: 97.7 F | DIASTOLIC BLOOD PRESSURE: 80 MMHG | HEIGHT: 65 IN | HEART RATE: 89 BPM | SYSTOLIC BLOOD PRESSURE: 118 MMHG | WEIGHT: 140.5 LBS | BODY MASS INDEX: 23.41 KG/M2

## 2021-06-03 VITALS — HEIGHT: 65 IN | WEIGHT: 135.25 LBS | BODY MASS INDEX: 22.53 KG/M2

## 2021-06-03 VITALS
HEART RATE: 89 BPM | WEIGHT: 140 LBS | SYSTOLIC BLOOD PRESSURE: 118 MMHG | BODY MASS INDEX: 23.12 KG/M2 | DIASTOLIC BLOOD PRESSURE: 80 MMHG

## 2021-06-03 NOTE — TELEPHONE ENCOUNTER
Patient is calling to get her (3) refills of Adderall filled and sent to pharmacy.    Looks like they have been loaded , and not approved yet.    Please ok , and send to pharmacy.    Irene Maharaj RN  Care Connection Triage/refill nurse

## 2021-06-03 NOTE — TELEPHONE ENCOUNTER
Refill Approved    Rx renewed per Medication Renewal Policy. Medication was last renewed on 1/3/19.    Garima Patel, Care Connection Triage/Med Refill 11/17/2019     Requested Prescriptions   Pending Prescriptions Disp Refills     tretinoin (RETIN-A) 0.025 % cream [Pharmacy Med Name: TRETINOIN 0.025% CREAM 45GM] 45 g 0     Sig: APPLY EXTERNALLY TO THE AFFECTED AREA DAILY       Topical Dermatology Medications Refill Protocol Passed - 11/16/2019 11:50 AM        Passed - Patient has had office visit/physical in last 1 year     Last office visit with prescriber/PCP: 6/25/2019 Deepti Swan CNP OR same dept: 6/25/2019 Deepti Swan CNP OR same specialty: 6/25/2019 Deepti Swan CNP  Last physical: Visit date not found Last MTM visit: Visit date not found   Next visit within 3 mo: Visit date not found  Next physical within 3 mo: Visit date not found  Prescriber OR PCP: Deepti Swan CNP  Last diagnosis associated with med order: 1. Acne  - tretinoin (RETIN-A) 0.025 % cream [Pharmacy Med Name: TRETINOIN 0.025% CREAM 45GM]; APPLY EXTERNALLY TO THE AFFECTED AREA DAILY  Dispense: 45 g; Refill: 0    If protocol passes may refill for 12 months if within 3 months of last provider visit (or a total of 15 months).

## 2021-06-03 NOTE — TELEPHONE ENCOUNTER
Medication:   Disp Refills Start End TRICIA   dextroamphetamine-amphetamine (ADDERALL XR) 30 MG 24 hr capsule 30 capsule 0 10/17/2019  No   Sig - Route: Take 1 capsule (30 mg total) by mouth daily. - Oral         Pharmacy:[id number] - [place], [place] - [address] AT [address]    Last Office Visit:6/25/19    Last Refill:10/17/19    Quantity:30    Refills: 0

## 2021-06-04 VITALS
HEIGHT: 65 IN | SYSTOLIC BLOOD PRESSURE: 116 MMHG | WEIGHT: 142 LBS | DIASTOLIC BLOOD PRESSURE: 68 MMHG | BODY MASS INDEX: 23.66 KG/M2 | HEART RATE: 88 BPM

## 2021-06-04 NOTE — PROGRESS NOTES
CC: Nexplanon removal    HPI: The patient is a 24 y.o. SWF P0 who presents for a Nexplanon removal.  She wants it removed because of frequent bleeding, increased acne, and mood changes.  She would like Depo Provera instead.    Exam: /80   Pulse 89   Wt 140 lb (63.5 kg)    Body mass index is 23.12 kg/m .  The patient's left arm was palpated between the biceps and the triceps.  The device was palpable.  Her arm was cleaned with betadine swabs x3.  One cc of 1% lidocaine was infiltrated at the insertion site scar.  Scalpel was used to make a small incision at the insertion site.  The end of the device was grasped with a fine-tipped clamp.  The capsule was cut with the scalpel, and the device was removed.  Bleeding was minimal.   Bandage and wrap dressing were placed.  The patient tolerated the removal well.  She was instructed to leave the dressing on at least till tonight if not tomorrow morning.  She knows she may have some bruising from the removal.    Assessment: Successful Nexplanon removal.    Plan:  Follow up care discussed with the patient.  After counseling on possible side effects with Depo Provera, she was given her first dose today.  She will continue to see Deepti Swan for her primary care.  Call if there are any questions or concerns.

## 2021-06-04 NOTE — PROGRESS NOTES
Assessment & Plan   1. IVON (generalized anxiety disorder)  Persistent generalized anxiety symptoms for several months now.  She has struggled with anxiety intermittently in the past.  We discussed treatment options for her.  She is interested in starting an SSRI.  Has not previously been treated with an SSRI.  Start on Lexapro 5 mg for 1 week then increase to 10 mg.  Med check in 4 to 6 weeks to reevaluate.  - escitalopram oxalate (LEXAPRO) 10 MG tablet; Take 1 tablet (10 mg total) by mouth daily.  Dispense: 30 tablet; Refill: 1    2. ADHD (attention deficit hyperactivity disorder)  Continue on current Adderall dose for now.  She has been stable on 30 mg for several years I doubt this is significantly contributing to her anxiety.  She feels inattention is well controlled on this dose.  Up-to-date on CSA.  Due for urine drug screen.  Will check at her follow-up appointment.  - dextroamphetamine-amphetamine (ADDERALL XR) 30 MG 24 hr capsule; Take 1 capsule (30 mg total) by mouth daily.  Dispense: 30 capsule; Refill: 0  - dextroamphetamine-amphetamine (ADDERALL XR) 30 MG 24 hr capsule; Take 1 capsule (30 mg total) by mouth daily.  Dispense: 30 capsule; Refill: 0  - dextroamphetamine-amphetamine (ADDERALL XR) 30 MG 24 hr capsule; Take 1 capsule (30 mg total) by mouth daily.  Dispense: 30 capsule; Refill: 0    3. Acute non-recurrent maxillary sinusitis  Concern for possible bacterial sinusitis with double sickening pattern and persistence of symptoms for over a month.  Treat with doxycycline.  Advised on side effects, take with food and start probiotics  - doxycycline (VIBRA-TABS) 100 MG tablet; Take 1 tablet (100 mg total) by mouth 2 (two) times a day for 10 days.  Dispense: 20 tablet; Refill: 0    Deepti Swan CNP    Subjective   Chief Complaint:  No chief complaint on file.    HPI:   Aleciaobed Saldaña is a 24 y.o. female who presents for med check.     ADHD:  Adderall XR 30mg.  She has been on this dose for a couple  of years. Wondering about possibly decreasing dose. Has noted increase in anxiety.  States this does not occur every day but most days of the week she experiences heightened anxiety, worrying, racing thoughts.  She has started a new job at a  and is living this.  She does not believe this is the source of her stress.  Was on guanfacine in the past for a brief period of time.  Wondering about either decreasing her Adderall dose or considering a medication for anxiety.  She denies any panic attack symptoms.    Contraception: Currently Nexplanon in place.  Has noted acne, mood changes.  Previously with OCP she did note weight gain and mood changes as well.  She is interested in considering the double shot.    URI symptoms:  Two months, improves and then worsens again. Stomach flu last Friday.  No longer having watery stools.  Sinus pressure, congestion.  No fever.  Persistent dry cough.      Allergies:  has No Known Allergies.    SH/FH:  Social History and Family History reviewed and updated.   Tobacco Status:  She  reports that she quit smoking about 8 months ago. Her smoking use included cigarettes. She has never used smokeless tobacco.    Review of Systems:  A complete head to toe ROS is negative unless otherwise noted in HPI    Objective   There were no vitals filed for this visit.    Physical Exam:  GENERAL: Alert, well-appearing female.   PSYCH: Pleasant mood, affect appropriate.  Good judgment and insight.    EYES: Conjunctiva pink, sclera white, no exudates.   NOSE: Nares patent, clear discharge.  Nasal mucosa boggy and injected.  MOUTH: Pharynx moist, pink without exudate. No tonsillar enlargement  NECK: No lymphadenopathy.   CV: Regular rate and rhythm without murmurs, rubs or gallops.  RESP: Lung sounds clear, symmetric excursion.

## 2021-06-04 NOTE — PATIENT INSTRUCTIONS - HE
Recommendations from today's visit                                                       1. Anxiety:  We will start you on Lexapro 5mg (1/2 tab) for one week and then increase to 10mg (1 tab) daily.  We will recheck in 4-6 weeks to see how you are doing on this.     2. ADHD:  We will plan to keep your Adderall dose stable for now    3. Contraception: To remove your Nexplanon, schedule with one of the midwives.  1. To schedule an appointment with the midwives for Nexaplanon removal call [phone].  You can start the Depo that day.     Next appointment: 4-6 weeks     To reschedule your appointment, please call the clinic directly at [phone].   It was a pleasure seeing you today! I look forward to seeing you again.

## 2021-06-05 VITALS
WEIGHT: 168.25 LBS | BODY MASS INDEX: 28.73 KG/M2 | DIASTOLIC BLOOD PRESSURE: 84 MMHG | HEART RATE: 120 BPM | HEIGHT: 64 IN | SYSTOLIC BLOOD PRESSURE: 120 MMHG

## 2021-06-05 NOTE — PROGRESS NOTES
Assessment & Plan   1. IVON (generalized anxiety disorder)  Good improvement in anxiety symptoms with Lexapro though is experiencing prominent fatigue.  Discussed moving the medication to the evening may help with this.  Also discussed that this sometimes improves with time and encouraged her to give this a month or 2.  If no improvement and she is still feeling quite groggy would consider switch in therapy to possibly sertraline.  She will reach out if she would like to make a switch.  Otherwise plan for med recheck in 6 months.  - escitalopram oxalate (LEXAPRO) 10 MG tablet; Take 1 tablet (10 mg total) by mouth daily.  Dispense: 90 tablet; Refill: 0    2. Attention deficit hyperactivity disorder (ADHD), predominantly inattentive type  Stable on current Adderall dose of 30 mg extended release.  Will update urine drug screen today.  CSA up-to-date.  Plan for med recheck in 6 months.  - Drug Abuse 1+, Urine    3. Acne  - clindamycin (CLEOCIN T) 1 % lotion; APPLY TOPICALLY TO THE FACE EVERY MORNING  Dispense: 60 mL; Refill: 3    Deepti Swna CNP    Subjective   Chief Complaint:  Anxiety    HPI:   Alecia Saldaña is a 25 y.o. female who presents for medication check.    She has been well.  Returning to her old job in March so that she has more flexibility for attending classes.  She ultimately wants to pursue her education degree and become a .  She is involved in the [employer] [facility] [employer] [facility] [employer]'s Day parade.    Anxiety:  She was seen one month ago for med check for ADHD and concerns of worsening anxiety.  Discussed options and ultimately started on Lexapro 10mg.  No changes to Adderall.  She has noted significant improvement since starting the Lexapro.  She states anxiety is generally less intense and less frequent.  Her mind feels quieter.  Not as many racing thoughts.  She has noted some prominent fatigue with the medication.  She tried moving it from midday to lunchtime and this helped slightly.   Still feeling quite fatigued by the evening and going to bed early.  No other side effects noted.    Contraception:  Nexplanon removed and started Depo one month ago. She states she is very happy with the Depo.  Light period.  No bleeding in between.      Allergies:  has No Known Allergies.    SH/FH:  Social History and Family History reviewed and updated.   Tobacco Status:  She  reports that she quit smoking about 9 months ago. Her smoking use included cigarettes. She has never used smokeless tobacco.    Review of Systems:  A complete head to toe ROS is negative unless otherwise noted in HPI    Objective   There were no vitals filed for this visit.    Physical Exam:  GENERAL: Alert, well-appearing female .   PSYCH: Pleasant mood, affect appropriate.  Good judgment and insight.  Intact recent and remote memory.  Good eye contact.

## 2021-06-06 NOTE — TELEPHONE ENCOUNTER
Pt did get her last depo on 12/12/19.  Pt was informed and will still come in for her appt today.

## 2021-06-08 NOTE — PROGRESS NOTES
"Alecia GIBSON Pauser is a 25 y.o. female who is being evaluated via a billable telephone visit.      The patient has been notified of following:     \"This telephone visit will be conducted via a call between you and your physician/provider. We have found that certain health care needs can be provided without the need for a physical exam.  This service lets us provide the care you need with a short phone conversation.  If a prescription is necessary we can send it directly to your pharmacy.  If lab work is needed we can place an order for that and you can then stop by our lab to have the test done at a later time.    Telephone visits are billed at different rates depending on your insurance coverage. During this emergency period, for some insurers they may be billed the same as an in-person visit.  Please reach out to your insurance provider with any questions.    If during the course of the call the physician/provider feels a telephone visit is not appropriate, you will not be charged for this service.\"    Patient has given verbal consent to a Telephone visit? Yes    What phone number would you like to be contacted at? [phone]    Patient would like to receive their AVS by AVS Preference: Dylon.    Additional provider notes:      Subjective   Chief Complaint:  ADHD and Anxiety    HPI:   Alecia GIBSON Pauser is a 25 y.o. female who presents for med check.   She has been well. Working from home. Went back to her old job right before COVID quarantine. Also adopted new puppy.     ADHD:  Has been stable on Adderall XR 30mg daily. She states she has been able to stay focused while working from home. Due for CSA and drug screen     Anxiety:  Started on Lexapro last year with good improvement. Some grogginess noted at last visit.  This continued so she switched to taking 1/2 tablet and now just approximately once a week.  Feels dog has been very helpful for anxiety. Anxiety previously experienced with job has been lessened.  " Feels working from home has helped with this.     Contraception:  Started Depo and was happy with this.  Decided to stop with COVID and using condoms and rhythm method.      Allergies:  has No Known Allergies.    SH/FH:  Social History and Family History reviewed and updated.   Tobacco Status:  She  reports that she has been smoking cigarettes. She has never used smokeless tobacco.    Review of Systems:  A complete head to toe ROS is negative unless otherwise noted in HPI    Assessment & Plan   1. Attention deficit hyperactivity disorder (ADHD), predominantly inattentive type  Inattention well controlled with Adderall XR 30mg.  Due for CSA and UDS but will due those at her next six month check in the fall. Continue to fill medications until that time.   - dextroamphetamine-amphetamine (ADDERALL XR) 30 MG 24 hr capsule; Take 1 capsule (30 mg total) by mouth daily.  Dispense: 30 capsule; Refill: 0  - dextroamphetamine-amphetamine (ADDERALL XR) 30 MG 24 hr capsule; Take 1 capsule (30 mg total) by mouth daily.  Dispense: 30 capsule; Refill: 0  - dextroamphetamine-amphetamine (ADDERALL XR) 30 MG 24 hr capsule; Take 1 capsule (30 mg total) by mouth daily.  Dispense: 30 capsule; Refill: 0    2. Anxiety  Generalized anxiety is now much improved between a change in work environment and getting a dog which she feels has been very therapeutic for her.  Tapered off of Lexapro and generally doing well.  Acute anxiety approximately once a week and interested in prn option for that.  Discussed options including risks, benefits, side effects.  No history of addiction for her. Will prescribed low dose lorazepam as needed. Recheck six months.  Will update CSA at that time.   - LORazepam (ATIVAN) 0.5 MG tablet; Take 1-2 tablets as needed every six hours for anxiety  Dispense: 30 tablet; Refill: 1    Deepti Swan CNP    Phone call duration:  15 minutes

## 2021-06-10 NOTE — TELEPHONE ENCOUNTER
FYI - Patient stated she will be calling in her next refill to different pharmacy but only once she finds a pharmacy closer to her.    Controlled Substance Refill Request  Medication Name:   Requested Prescriptions     Pending Prescriptions Disp Refills     dextroamphetamine-amphetamine (ADDERALL XR) 30 MG 24 hr capsule 30 capsule 0     Sig: Take 1 capsule (30 mg total) by mouth daily.     Date Last Fill: 7/6/20  Is patient out of medication?: No, 1 days left  Patient notified refills processed within 3 business days:  Yes  Requested Pharmacy: [facility]  Submit electronically to pharmacy  Controlled Substance Agreement on file:   Encounter-Level CSA Scan Date - 07/26/2018:    Scan on 7/30/2018  1:34 PM        Last office visit:  5/7/20

## 2021-06-11 NOTE — TELEPHONE ENCOUNTER
See the request from 9/8/20. It's unclear why that encounter was closed. Patient stated she is out now and would like a refill sent in as soon as possible. Patient stated she would like a 3 month supply sent in.    Controlled Substance Refill Request  Medication Name:   Requested Prescriptions     Pending Prescriptions Disp Refills     dextroamphetamine-amphetamine (ADDERALL XR) 30 MG 24 hr capsule 30 capsule 0     Sig: Take 1 capsule (30 mg total) by mouth daily.     Date Last Fill: 8/11/20  Requested Pharmacy: [facility]  Submit electronically to pharmacy  Controlled Substance Agreement on file:   Encounter-Level CSA Scan Date - 07/26/2018:    Scan on 7/30/2018  1:34 PM        Last office visit:  5/7/20

## 2021-06-11 NOTE — TELEPHONE ENCOUNTER
Needs refill of her adderall asap. She has her last tab today or tomorrow so needs refill asap.  Please expedite if possible.    Controlled Substance Refill Request  Medication Name:   Requested Prescriptions     Pending Prescriptions Disp Refills     dextroamphetamine-amphetamine (ADDERALL XR) 30 MG 24 hr capsule 30 capsule 0     Sig: Take 1 capsule (30 mg total) by mouth daily.     Date Last Fill: 8/11/2020  Requested Pharmacy: [facility]  Submit electronically to pharmacy  Controlled Substance Agreement on file:   Encounter-Level CSA Scan Date - 07/26/2018:    Scan on 7/30/2018  1:34 PM        Last office visit:  5/7/2020

## 2021-06-11 NOTE — TELEPHONE ENCOUNTER
Who is calling:  Patient  Reason for Call:  Patient is out of medication, checking on status of refill request  Date of last appointment with primary care: 5/07/2020  Okay to leave a detailed message: Yes

## 2021-06-12 NOTE — TELEPHONE ENCOUNTER
Controlled Substance Refill Request  Medication Name:   Requested Prescriptions     Pending Prescriptions Disp Refills     dextroamphetamine-amphetamine (ADDERALL XR) 30 MG 24 hr capsule 30 capsule 0     Sig: Take 1 capsule (30 mg total) by mouth daily.     Date Last Fill: 9/11/20  Is patient out of medication?: No, 6 days left  Patient notified refills processed within 3 business days:  Yes  Requested Pharmacy: [facility]  Submit electronically to pharmacy  Controlled Substance Agreement on file:   Encounter-Level CSA Scan Date - 07/26/2018:    Scan on 7/30/2018  1:34 PM        Last office visit:  5/7/20

## 2021-06-13 NOTE — PROGRESS NOTES
"Alecia Saldaña is a 25 y.o. female who is being evaluated via a billable video visit.      The patient has been notified of following:     \"This video visit will be conducted via a call between you and your physician/provider. We have found that certain health care needs can be provided without the need for an in-person physical exam.  This service lets us provide the care you need with a video conversation.  If a prescription is necessary we can send it directly to your pharmacy.  If lab work is needed we can place an order for that and you can then stop by our lab to have the test done at a later time.    Video visits are billed at different rates depending on your insurance coverage. Please reach out to your insurance provider with any questions.    If during the course of the call the physician/provider feels a video visit is not appropriate, you will not be charged for this service.\"    Patient has given verbal consent to a Video visit? Yes  How would you like to obtain your AVS? AVS Preference: Mail a copy.  If dropped by the video visit, the video invitation should be sent to: Send to e-mail at: [email or web address]  Will anyone else be joining your video visit? No        Video Start Time: 1:04 PM    Additional provider notes:    Assessment & Plan   1. Attention deficit hyperactivity disorder (ADHD), predominantly inattentive type  Inattention well controlled on Adderall XR 30mg dose . Due for CSA and drug screen, will complete this at next visit in six months which will be in person.   - dextroamphetamine-amphetamine (ADDERALL XR) 30 MG 24 hr capsule; Take 1 capsule (30 mg total) by mouth daily.  Dispense: 30 capsule; Refill: 0  - dextroamphetamine-amphetamine (ADDERALL XR) 30 MG 24 hr capsule; Take 1 capsule (30 mg total) by mouth daily.  Dispense: 30 capsule; Refill: 0  - dextroamphetamine-amphetamine (ADDERALL XR) 30 MG 24 hr capsule; Take 1 capsule (30 mg total) by mouth daily.  Dispense: 30 capsule; " Refill: 0    2. Anxiety  Anxiety level feels manageable currently.  Rare use of lorazepam at max once a month.     3. General counseling for prescription of oral contraceptives  Doing well off of oral contraceptive, condoms currently    Deepti Swan CNP    Subjective   Chief Complaint:  Follow-up (med check)    HPI:   Alecia GIBSON Pauser is a 25 y.o. female who presents for follow up    ADHD:  Stable on Adderall XR 30mg for some time.  She feels this is very effective most days.  Occasionally has a day where she feels it is 'too much' Feels edgy, irritable.  Very uncommon.  Able to focus at work.  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      Anxiety: Lorazepam prn.Very rare that she needs to take this.  Typically at most once a month.  Able to find other ways to calm herself down.  Mood has been very stable.  Is working in person at the office so not feeling as isolated.      Contraception:  Had a scare with birth control, late two weeks but not pregnant.  Has transitioned off the birth control and appreciates how she feels off of it.  Less escoto, more energy.  Using condoms.       Allergies:  has No Known Allergies.    SH/FH:  Social History and Family History reviewed and updated.   Tobacco Status:  She  reports that she has been smoking cigarettes. She has never used smokeless tobacco.    Review of Systems:  A complete head to toe ROS is negative unless otherwise noted in HPI    Objective   There were no vitals filed for this visit.    Physical Exam:  GENERAL: Alert, well-appearing  PSYCH: Pleasant mood, affect appropriate.  Good judgment and insight.            Video-Visit Details    Type of service:  Video Visit    Video End Time (time video stopped): 1:23 PM  Originating Location (pt. Location): Home    Distant Location (provider location):  [facility]     Platform used for Video Visit: Karly Swan CNP

## 2021-06-13 NOTE — TELEPHONE ENCOUNTER
Patient states if she need to see for medication please call her   Controlled Substance Refill Request  Medication Name:   Requested Prescriptions     Pending Prescriptions Disp Refills     dextroamphetamine-amphetamine (ADDERALL XR) 30 MG 24 hr capsule 30 capsule 0     Sig: Take 1 capsule (30 mg total) by mouth daily.     Date Last Fill: 10/21/20  Is patient out of medication?:  Yes  Patient notified refills processed within 3 business days:  Yes  Requested Pharmacy: [id number]  Submit electronically to pharmacy  Controlled Substance Agreement on file:   Encounter-Level CSA Scan Date - 07/26/2018:    Scan on 7/30/2018  1:34 PM      Last office visit:  05/07/2020

## 2021-06-13 NOTE — TELEPHONE ENCOUNTER
Refilled.  Yes, she needs a medication check. This can be in person or virtual d/t COVID.  Please remind she should just schedule this every six months

## 2021-06-16 ENCOUNTER — AMBULATORY - HEALTHEAST (OUTPATIENT)
Dept: FAMILY MEDICINE | Facility: CLINIC | Age: 26
End: 2021-06-16

## 2021-06-16 ENCOUNTER — OFFICE VISIT - HEALTHEAST (OUTPATIENT)
Dept: FAMILY MEDICINE | Facility: CLINIC | Age: 26
End: 2021-06-16

## 2021-06-16 DIAGNOSIS — Z72.0 TOBACCO ABUSE: ICD-10-CM

## 2021-06-16 DIAGNOSIS — N92.6 MISSED PERIOD: ICD-10-CM

## 2021-06-16 DIAGNOSIS — Z3A.01 LESS THAN 8 WEEKS GESTATION OF PREGNANCY: ICD-10-CM

## 2021-06-16 DIAGNOSIS — F90.0 ATTENTION DEFICIT HYPERACTIVITY DISORDER (ADHD), PREDOMINANTLY INATTENTIVE TYPE: ICD-10-CM

## 2021-06-16 LAB — HCG UR QL: POSITIVE

## 2021-06-16 NOTE — PROGRESS NOTES
"Subjective:  26 y.o. female with concerns left shoulder pain.  Been present for about 1 day.  She is trying to remember everything that happened to her left shoulder in the past few days and cannot pinpoint any event.  Seem to come on fairly abruptly and noted tenderness to touch over the clavicle.  Has some pain with extension and abduction.  Able to do full arc but it is painful.  No specific injury recently.  Does think that she had some previous injury to her shoulder from cheerleading injuries and references a SL APinjury.    No fevers or chills or other illnesses.  She did receive a [place] COVID-19 vaccine in her left deltoid 5 days ago.    Outpatient Medications Prior to Visit   Medication Sig Dispense Refill     [START ON 2021] dextroamphetamine-amphetamine (ADDERALL XR) 30 MG 24 hr capsule Take 1 capsule (30 mg total) by mouth daily. 30 capsule 0     multivitamin with minerals tablet Take by mouth.       clindamycin (CLEOCIN T) 1 % lotion APPLY TOPICALLY TO THE FACE EVERY MORNING 60 mL 3     dextroamphetamine-amphetamine (ADDERALL XR) 30 MG 24 hr capsule Take 1 capsule (30 mg total) by mouth daily. 30 capsule 0     [START ON 2021] dextroamphetamine-amphetamine (ADDERALL XR) 30 MG 24 hr capsule Take 1 capsule (30 mg total) by mouth daily. 30 capsule 0     LORazepam (ATIVAN) 0.5 MG tablet Take 1-2 tablets as needed every six hours for anxiety 30 tablet 1     tretinoin (RETIN-A) 0.025 % cream APPLY EXTERNALLY TO THE AFFECTED AREA DAILY 45 g 3     No facility-administered medications prior to visit.       Social History     Tobacco Use   Smoking Status Current Every Day Smoker     Types: Cigarettes     Last attempt to quit: 2019     Years since quittin.0   Smokeless Tobacco Never Used   Tobacco Comment    uses a Juul      Objective:  /84 (Patient Site: Right Arm, Patient Position: Sitting, Cuff Size: Adult Regular)   Pulse (!) 120   Ht 5' 4\" (1.626 m)   Wt 168 lb 4 oz (76.3 kg)   " LMP 04/09/2021 (Exact Date)   BMI 28.88 kg/m    Left Shoulder Exam:  Tenderness to palpation at junction of medial and middle third of the clavicle where there is a small protuberant soft nodule.  No other lymph nodes palpable in the supraclavicular fossa.  Arc: Painful but able to complete it.  Hawkin's: negative  Neer's: negative  Push off: negative  Empty can: negative  Cross arm impingement: negative  Sulcus: negative  Apprehension:  negative    Distal capillary refill, pulses, and motor and sensory function intact and normal.    Radiographs of clavicle:  Personally reviewed.  No fracture.  No visible abnormality overlying the clavicle in the area of concern.    Assessment and Plan:   1. Clavicle pain  2. Lymphadenopathy  3. Vaccine reaction, initial encounter  Timing fairly suspicious for lymphadenopathy related to COVID-19 vaccination.  Normal x-ray makes osseous problem fairly unlikely.  Discussed my clinical suspicion recommended conservative treatment with NSAIDs or acetaminophen.  Clinical monitoring.  If worsening would want to reevaluate.  I do recommend she receive the second vaccination at the usual time.

## 2021-06-16 NOTE — TELEPHONE ENCOUNTER
Telephone Encounter by Emigdio Bocanegra CMA at 3/2/2020  9:39 AM     Author: Emigdio Bocanegra CMA Service: -- Author Type: Certified Medical Assistant    Filed: 3/2/2020  9:50 AM Encounter Date: 3/2/2020 Status: Signed    : Emigdio Bocanegra CMA (Certified Medical Assistant)       Patient Returning Call  Reason for call:  Return call  Information relayed to patient:  Patient was informed of the message below.  Patient has additional questions:  Yes  If YES, what are your questions/concerns:  Patient stated that during appointment 1/16/2020 she did not have a depo shot. Patient stated that she had the depo shot 12/12/19 visit when she had her nexplanon removed then replaced with depo shot. Please review and advise.  Okay to leave a detailed message?: Yes  [phone]      1.Copied and pasted from office notes 12/12/19:    Progress Notes by Kathleen Pozo MD12/12/2019  Signed  HPI: The patient is a 24 y.o. SWF P0 who presents for a Nexplanon removal. She wants it removed because of frequent bleeding, increased acne, and mood changes. She would like Depo Provera instead.    Exam: /80  Pulse 89  Wt 140 lb (63.5 kg)   Plan: Follow up care discussed with the patient. After counseling on possible side effects with Depo Provera, she was given her first dose today. She will continue to see Deepti Swan for her primary care. Call if there are any questions or concerns.     medroxyPROGESTERone  medroxyPROGESTERone injection 150 mg (DEPO-PROVERA)  150 mg, Intramuscular, Every 3 months, First dose on Thu 12/12/19 at 1600, For 4 doses, Routine   Order Details  Ordered on: 12/12/19   Associated Dx: Encounter for other general counseling or advice on contraception   End date: 12/06/20   Authorizing provider: Kathleen Pozo MD   History  12/12/19 1544 Given         2. Copied and pasted from office notes 1/16/2020:    Progress Notes by Deepti Swan CNP1/16/2020  Signed  Anxiety: She was seen one month ago for med check for  ADHD and concerns of worsening anxiety. Discussed options and ultimately started on Lexapro 10mg. No changes to Adderall. She has noted significant improvement since starting the Lexapro. She states anxiety is generally less intense and less frequent. Her mind feels quieter. Not as many racing thoughts. She has noted some prominent fatigue with the medication. She tried moving it from midday to lunchtime and this helped slightly. Still feeling quite fatigued by the evening and going to bed early. No other side effects noted.    Contraception: Nexplanon removed and started Depo one month ago. She states she is very happy with the Depo. Light period. No bleeding in between.     Allergies: has No Known Allergies.

## 2021-06-16 NOTE — TELEPHONE ENCOUNTER
Reason for Call:  Medication or medication refill:    Do you use a [facility]?  Name of the pharmacy and phone number for the current request: [facility]     Name of the medication requested:   dextroamphetamine-amphetamine (ADDERALL XR) 30 MG 24 hr capsule 30 capsule 0 2/12/2021  No   Sig - Route: Take 1 capsule (30 mg total) by mouth daily. - Oral         Other request:  Pt has 4 pills left.  Monday will be last pill    Can we leave a detailed message on this number? Yes    Phone number patient can be reached at:   Cell number on file:    Telephone Information:   Mobile [phone]       Best Time: na    Call taken on 3/25/2021 at 4:55 PM by Pamela Behr

## 2021-06-16 NOTE — TELEPHONE ENCOUNTER
Needs to be seen for followup, possible lab testing.  Please make an appointment, or refer her to walk in care.

## 2021-06-17 NOTE — PROGRESS NOTES
Assessment & Plan   1. Attention deficit hyperactivity disorder (ADHD), predominantly inattentive type  Continues to feel Adderall 30mg is effective.  Has noted increase strength since switching from generic to brand with insurance.  For now she feels this is okay but if she continues to have difficulties stopping for breaks she will reach out to try lower dose  - Drug Abuse 1+, Urine    2. Urge incontinence of urine  Likely related to holding urine. Check urinalysis  - Urinalysis Macro & Micro    3. Fertility testing  Discussed low concern for infertility as they have not been intentionally trying to time intercourse during ovulatory window and cycles are regular, however as it has been over a year reasonable to do some workup.  Will return for day 3 FSH and labs. Also interested in luteal phase progestin next month to ensure ovulation and will order this after initial labs.   - Follicle Stimulating Hormone (FSH); Future  - Thyroid Cascade; Future  - Estradiol; Future    4. Tobacco abuse  Not interested in assistance with quitting at this time. Recommended immediate cessation should she become pregnant. Total time on tobacco cessation 3 minutes  - [place] TOBACCO USE CESSATION INTERMEDIATE 3-10 MINUTES    Deepti Swan CNP    Subjective   Chief Complaint:  Follow-up (med check)    HPI:   Alecia GIBSON Pauser is a 26 y.o. female who presents for medication check.     ADHD:  Adderall XR 30mg for several years.  Due for CSA and UDS.  Feels this dose is effective for her.  She notes that since switching from generic to brand Adderall she feels the effect is stronger. She has noted some mild urge incontinence.  No dysuria.  Her theory is that she is so focused that she does not take a break until her bladder is past full.      Fertility:  She and partner have been having intercourse without any protection for a year and a half.  She states they have not been specifically trying for pregnancy but also not avoiding it and would be  "happy if this occurred.  She is concerned that she has not become pregnant in that time despite regular intercourse.  Periods are regular every 26-30 days.  Does have PMS symptoms.  Interested in workup for this.     Anxiety:  Lorazepam prn. Takes maximum 1-2 times/month for acute anxiety episodes.      Tobacco:  Currently 1/2 ppd. She is interested in quitting though does not feel ready now and not interested in additional help. States she feels confident she could quit immediately if positive pregnancy test.     Allergies:  has No Known Allergies.    SH/FH:  Social History and Family History reviewed and updated.   Tobacco Status:  She  reports that she has been smoking cigarettes. She has never used smokeless tobacco.    Review of Systems:  A complete head to toe ROS is negative unless otherwise noted in HPI    Objective     Vitals:    05/24/21 1450   Weight: 165 lb (74.8 kg)   Height: 5' 4\" (1.626 m)       Physical Exam:  GENERAL: Alert, well-appearing  PSYCH: Pleasant mood, affect appropriate.  Good judgment and insight.      "

## 2021-06-17 NOTE — PROGRESS NOTES
Assessment & Plan   1. Attention deficit hyperactivity disorder:  Referral to psychiatry as requested for ADHD and IVON.  Reviewed mechanism of action and typical uses of guanfacine and that while this has been mildly effective for her there are other medications that would likely offer more improvement for her anxiety. She will follow up before she is due for her next refill of Adderall in two months if she has not established with psychiatry by that time.  Physical and pap at that visit.   - Ambulatory referral to Psychiatry    2. Generalized anxiety disorder  - Ambulatory referral to Psychiatry    Deepti Swan CNP     Total Time: 35 minutes.  Coordination of Care Time: 30 minutes spent including:  History, exam, discussion of possible diagnoses, testing today, next steps and follow up.    Subjective   Chief Complaint:  Establish Care    HPI:   Alecia GIBSON Pauser is a 23 y.o. female who presents for establish care and psychiatry referral.      She is new to the clinic. Previously followed in the [facility] in [place] and more recently seeing a pediatrician, records requested.  She states she has been treated for ADHD since age 19, on varying doses of Adderall XR.  Previously followed with psychiatry in [place] for this as well as IVON.  She states anxiety seemed to worsen over the past six months and her pediatrician recently added guanfacine 3mg two months ago for this reason.  She does feel this has helped even out her mood.  Continues to experience daily anxiety though not to the extent as previously.  Has also had a stressful month that has now resolved.  Working in a difficult job, was let go and now looking for new work which is a relief to her.  Never finished school in [place], looking for jobs in sales.  She states her previous provider highly recommended psychiatry consult and she would like to pursue this.  Previously refused antidepressants for anxiety, now unsure if she is open to this.  Not in  need of medication refills today.      HM:  Due for pap        Allergies:  has no allergies on file.    SH/FH:  Social History and Family History reviewed and updated.   Tobacco Status:  She  has no tobacco history on file.    Review of Systems:  A complete head to toe ROS is negative unless otherwise noted in HPI    Objective   There were no vitals filed for this visit.    Physical Exam:  GENERAL: Alert, well-appearing female  PSYCH: Pleasant mood, affect appropriate.  Good judgment and insight.  Intact recent and remote memory.  Good eye contact.

## 2021-06-17 NOTE — PATIENT INSTRUCTIONS - HE
Recommendations from today's visit                                                       1. Schedule a lab-only appointment for day 3 of your next menstrual cycle.       Next appointment: six months    To reschedule your appointment, please call the clinic directly at [phone].   It was a pleasure seeing you today! I look forward to seeing you again.

## 2021-06-19 NOTE — PROGRESS NOTES
FEMALE PREVENTIVE EXAM    Assessment & Plan   1. Routine general medical examination at a health care facility:  Pap today, if normal repeat in three years.     2. ADHD (attention deficit hyperactivity disorder):  No longer following with psychiatry.  Requests transfer of medication to this office.  Adderall refilled.  Reviewed policies and CSA signed today.  Will plan for med checks every six months.  Drug screen at next visit.   - dextroamphetamine-amphetamine (ADDERALL XR) 30 MG 24 hr capsule; TK ONE C PO  QD  Dispense: 30 capsule; Refill: 0  - dextroamphetamine-amphetamine (ADDERALL XR) 30 MG 24 hr capsule; Take 1 capsule (30 mg total) by mouth daily.  Dispense: 30 capsule; Refill: 0  - dextroamphetamine-amphetamine (ADDERALL XR) 30 MG 24 hr capsule; Take 1 capsule (30 mg total) by mouth daily.  Dispense: 30 capsule; Refill: 0    3. Anxiety:  Currently well controlled.  No longer taking guanfacine. Will follow up if experiences worsening    4. Acne  - tretinoin (RETIN-A) 0.025 % cream; Apply topically daily.  Dispense: 45 g; Refill: 1  - clindamycin (CLEOCIN T) 1 % lotion; APPLY 1 APPLICATION IN THE MORNING TOPICALLY TO WHOLE FACE.  Dispense: 60 mL; Refill: 2    5. Screening for cervical cancer  - Gynecologic Cytology (PAP Smear)    Recommend repeat pap smear if normal every three years, Recommended adequate calcium intake/osteoporosis prevention, Discussed breast cancer screening guidelines, Discussed safe sex practices and Discussed diet, including moderation of portions sizes, avoiding eating out and fast food and increase in fruits and vegetables    Deepti Swan CNP    Subjective:   Chief Complaint:  Gynecologic Exam and Medication Refill (adderall)    HPI:  Alecia GIBSON Pauser is a 23 y.o. female who presents for routine physical exam and med check.      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  She feels very engaged, enjoying her coworkers, overall very happy.   Recently also moved to a new apartment with her partner.  She feels anxiety is much better controlled. Still anxious on occasion but able to deal with this.  No longer taking guanfacine. She did end up meeting with psychiatry though before her second follow up she felt much better so opted to not return.      ADHD:  Symptoms remain well controlled on Adderall XR 30mg.  She would like to have this prescription filled at this office.      OB/Gyn History:  Menstrual history: light spotting with Nexplanon  Date of previous pap: none  History of abnormal pap: none  Current Contraceptive method: Nexplanon    Preventive Health:  Reviewed and recommended screening and treatment recommendations:  Immunizations: up to date    Health Habits:    Exercise: intermittently.  Calcium intake/Osteoporosis prevention: no    PMH:   Patient Active Problem List   Diagnosis     Attention deficit hyperactivity disorder     Generalized anxiety disorder       No past medical history on file.    Current Medications: Reviewed   Current Outpatient Prescriptions on File Prior to Visit   Medication Sig Dispense Refill     clindamycin (CLEOCIN T) 1 % lotion APPLY 1 APPLICATION IN THE MORNING TOPICALLY TO WHOLE FACE.  2     dextroamphetamine-amphetamine (ADDERALL XR) 30 MG 24 hr capsule TK ONE C PO  QD 30 capsule 0     etonogestrel (NEXPLANON) 68 mg Impl implant Inject 68 mg under the skin.       guanFACINE (INTUNIV) 3 mg Tb24 tablet TK 1 T PO D FOR 2 WEEKS. MAY INCREASE TO 2 TS A DAY  0     multivitamin with minerals tablet Take by mouth.       tretinoin (RETIN-A) 0.025 % cream Apply topically daily.       No current facility-administered medications on file prior to visit.        Allergies:  Reviewed  has No Known Allergies.    Social History:  Social History     Occupational History     Not on file.     Social History Main Topics     Smoking status: Current Every Day Smoker     Packs/day: 0.50     Types: Cigarettes     Smokeless tobacco: Never  Used     Alcohol use Yes      Comment: Occasionally     Drug use: Not on file     Sexual activity: Not on file       Family History:   Family History   Problem Relation Age of Onset     Arthritis Paternal Aunt      Arthritis Maternal Grandmother      Diabetes Maternal Grandmother      Heart disease Maternal Grandmother      Arthritis Maternal Grandfather      Cancer Maternal Grandfather      Diabetes Maternal Grandfather      Arthritis Paternal Grandmother      Arthritis Paternal Grandfather      Prostate cancer Paternal Grandfather          Review of Systems:  Complete head to toe review of systems is otherwise negative except as above.    Objective:    /82 (Patient Site: Right Arm, Patient Position: Sitting, Cuff Size: Adult Regular)  Pulse 87  Wt 133 lb 8 oz (60.6 kg)  LMP 07/24/2018 (Exact Date)  SpO2 98%  Breastfeeding? No  BMI 22.05 kg/m2    GENERAL: Alert, well-appearing female  PSYCH: Pleasant mood. Rapid, tangential speech. Fidgeting. Good judgment and insight.  Intact recent and remote memory.   SKIN: No atypical lesions  EYES: Conjunctiva pink, sclera white, no exudates. MARIEL.  EOMs intact.   EARS: TMs pearly grey, no bulging, redness, retraction.   MOUTH: Pharynx moist, pink without exudate. No tonsillar enlargement  NECK: No lymphadenopathy. Thyroid borders smooth without enlargement, nodules.   CV: Regular rate and rhythm without murmurs, rubs or gallops.  RESP: Lung sounds clear  ABDOMEN: BS+. Abdomen soft.  No organomegaly  BREASTS: Breasts symmetric, no dimpling, masses or skin discolorations seen. Areolas and nipples symmetric without discharge. On palpation, breast tissue supple and nontender. No masses or nodules. Axillary and epitrochlear lymph nodes nonpalpable.    FEMALE: External genitalia without lesions. Vaginal walls and cervix without lesions or masses. No abnormal discharge. Pap smear obtained. On bimanual palpation, uterus mobile, normal shape and contour. No adnexal  masses or tenderness.   PV :  No edema

## 2021-06-19 NOTE — LETTER
Letter by Deepti Swan CNP at      Author: Deepti Swan CNP Service: -- Author Type: --    Filed:  Encounter Date: 6/25/2019 Status: (Other)         [facility] FAMILY MEDICINE/OB  06/25/19    Patient: Alecia Saldaña  YOB: 1995  Medical Record Number: [id number]  CSN: [id number]                                                                              Non-opioid Controlled Substance Agreement    I understand that my care provider has prescribed a controlled substance to help manage my condition(s). I am taking this medicine to help me function or work. I know this is strong medicine, and that it can cause serious side effects. Controlled substances can be sedating, addicting and may cause a dependency on the drug. They can affect my ability to drive or think, and cause depression. They need to be taken exactly as prescribed. Combining controlled substances with certain medicines or chemicals (such as cocaine, sedatives and tranquilizers, sleeping pills, meth) can be dangerous or even fatal. Also, if I stop controlled substances suddenly, I may have severe withdrawal symptoms.  If not helpful, I may be asked to stop them.    Adderall XR 30mg.  Med checks every six months    The risks, benefits, and side effects of these medicine(s) were explained to me. I agree that:    1. I will take part in other treatments as advised by my care team. This may be psychiatry or counseling, physical therapy, behavioral therapy, group treatment or a referral to a pain clinic. I will reduce or stop my medicine when my care team tells me to do so.  2. I will take my medicines as prescribed. I will not change the dose or schedule unless my care team tells me to. There will be no refills if I run out early.  I may be contactedwithout warning and asked to complete a urine drug test or pill count at any time.   3. I will keep all my appointments, and understand this is part of the monitoring of controlled  substances. My care team may require an office visit for EVERY controlled substance refill. If I miss appointments or dont follow instructions, my care team may stop my medicine.  4. I will not ask other providers to prescribe controlled substances, and I will not accept controlled substances from other people. If I need another prescribed controlled substance for a new reason, I will tell my care team within 1 business day.  5. I will use one pharmacy to fill all of my controlled substance prescriptions, and it is up to me to make sure that I do not run out of my medicines on weekends or holidays. If my care team is willing to refill my controlled substance prescription without a visit, I must request refills only during office hours, refills may take up to 3 days to process, and it may take up to 5 to 7 days for my medicine to be mailed and ready at my pharmacy. Prescriptions will not be mailed anywhere except my pharmacy.    6. I am responsible for my prescriptions. If the medicine/prescription is lost or stolen, it will not be replaced. I also agree not to share controlled substance medicines with anyone.          [facility] FAMILY MEDICINE/OB  06/25/19  Patient:  Alecia Saldaña  YOB: 1995  Medical Record Number: [id number]  CSN: [id number]    7. I agree to not use ANY illegal or recreational drugs. This includes marijuana, cocaine, bath salts or other drugs. I agree not to use alcohol unless my care team says I may. I agree to give urine samples whenever asked. If I dont give a urine sample, the care team may stop my medicine.    8. If I enroll in the [place] Medical Marijuana program, I will tell my care team. I will also sign an agreement to share my medical records with my care team.    9. I will bring in my list of medicines (or my medicine bottles) each time I come to the clinic.   10. I will tell my care team right away if I become pregnant or have a new medical problem treated  outside of my regular clinic.  11. I understand that this medicine can affect my thinking and judgment. It may be unsafe for me to drive, use machinery and do dangerous tasks. I will not do any of these things until I know how the medicine affects me. If my dose changes, I will wait to see how it affects me. I will contact my care team if I have concerns about medicine side effects.    I understand that if I do not follow any of the conditions above, my prescriptions or treatment may be stopped.      I agree that my provider, clinic care team, and pharmacy may work with any city, state or federal law enforcement agency that investigates the misuse, sale, or other diversion of my controlled medicine. I will allow my provider to discuss my care with or share a copy of this agreement with any other treating provider, pharmacy or emergency room where I receive care. I agree to give up (waive) any right of privacy or confidentiality with respect to these consents.   I have read this agreement and have asked questions about anything I did not understand.    ___________________________________________________________________________  Patient signature - Date/Time  -Alecia GIBSON Pauser                                      ___________________________________________________________________________  Witness signature                                                                    ___________________________________________________________________________  Provider signature- Deepti Swan CNP

## 2021-06-20 NOTE — LETTER
Letter by Deepti Swan CNP at      Author: Deepti Swan CNP Service: -- Author Type: --    Filed:  Encounter Date: 1/16/2020 Status: Signed                    My Depression Action Plan  Name: Alecia GIBSON Pauser   Date of Birth 1995  Date: 1/16/2020    My Doctor: Deepti Swan CNP   My Clinic: [facility]  [address] [address]  [phone]          GREEN    ZONE   Good Control    What it looks like:     Things are going generally well. You have normal ups and downs. You may even feel depressed from time to time, but bad moods usually last less than a day.   What you need to do:  1. Continue to care for yourself (see self care plan)  2. Check your depression survival kit and update it as needed  3. Follow your physicians recommendations including any medication.  4. Do not stop taking medication unless you consult with your physician first.           YELLOW         ZONE Getting Worse    What it looks like:     Depression is starting to interfere with your life.     It may be hard to get out of bed; you may be starting to isolate yourself from others.    Symptoms of depression are starting to last most all day and this has happened for several days.     You may have suicidal thoughts but they are not constant.   What you need to do:     1. Call your care team. Your response to treatment will improve if you keep your care team informed of your progress. Yellow periods are signs an adjustment may need to be made.     2. Continue your self-care.  Just get dressed and ready for the day.  Don't give yourself time to talk yourself out of it.    3. Talk to someone in your support network.    4. Open up your depression Depression Self-Care Plan / Wellness kit.           RED    ZONE Medical Alert - Get Help    What it looks like:     Depression is seriously interfering with your life.     You may experience these or other symptoms: You cant get out of bed most days, cant work or  engage in other necessary activities, you have trouble taking care of basic hygiene, or basic responsibilities, thoughts of suicide or death that will not go away, self-injurious behavior.     What you need to do:  1. Call your care team and request a same-day appointment. If they are not available (weekends or after hours) call your local crisis line, emergency room or 911.            Self-Care Plan / Wellness Kit    Self-Care for Depression  Heres the deal. Your body and mind are really not as separate as most people think.  What you do and think affects how you feel and how you feel influences what you do and think. This means if you do things that people who feel good do, it will help you feel better.  Sometimes this is all it takes.  There is also a place for medication and therapy depending on how severe your depression is, so be sure to consult with your medical provider and/ or Behavioral Health Consultant if your symptoms are worsening or not improving.     In order to better manage my stress, I will:    Exercise  Get some form of exercise, every day. This will help reduce pain and release endorphins, the feel good chemicals in your brain. This is almost as good as taking antidepressants!  This is not the same as joining a gym and then never going! (they count on that by the way?) It can be as simple as just going for a walk or doing some gardening, anything that will get you moving.      Hygiene   Maintain good hygiene (get out of bed in the morning, make your bed, brush your teeth, take a shower, and get dressed like you were going to work, even if you are unemployed).  If your clothes don't fit try to get ones that do.    Diet  Strive to eat foods that are good for me, drink plenty of water, and avoid excessive sugar, caffeine, alcohol, and other mood-altering substances.  Some foods that are helpful in depression are: complex carbohydrates, B vitamins, flaxseed, fish or fish oil, fresh fruits and  vegetables.    Psychotherapy  Agree to participate in Individual Therapy (if recommended).    Medication  If prescribed medications, I agree to take them.  Missing doses can result in serious side effects.  I understand that drinking alcohol, or other illicit drug use, may cause potential side effects.  I will not stop my medication abruptly without first discussing it with my provider.    Staying Connected With Others  Stay in touch with my friends, family members, and my primary care provider/team.    Use your imagination  Be creative.  We all have a creative side; it doesnt matter if its oil painting, sand castles, or mud pies! This will also kick up the endorphins.    Witness Beauty  (AKA stop and smell the roses) Take a look outside, even in mid-winter. Notice colors, textures. Watch the squirrels and birds.     Service to others  Be of service to others.  There is always someone else in need.  By helping others we can get out of ourselves and remember the really important things.  This also provides opportunities for practicing all the other parts of the program.    Humor  Laugh and be silly!  Adjust your TV habits for less news and crime-drama and more comedy.    Control your stress  Try breathing deep, massage therapy, biofeedback, and meditation. Find time to relax each day.     Crisis Text Line  http://www.crisistextline.org    The Crisis Text Line serves anyone, in any type of crisis, providing access to free, 24/7 support and information via the medium people already use and trust:    Here's how it works:  1.  Text 418-814 from anywhere in the [place], anytime, about any type of crisis.  2.  A live, trained Crisis Counselor receives the text and responds quickly.  3.  The volunteer Crisis Counselor will help you move from a 'hot moment to a cool moment'.  My support system    Clinic Contact: Deepti Swan DNP Phone number: [phone]   Contact 1:  Phone number:    Contact 2:  Phone number:     Sabianism/:  Phone number:    Therapist:  Phone number:    [facility] crisis center:    Phone number:    Other community support:  Phone number:

## 2021-06-21 NOTE — LETTER
Letter by Deepti Swan CNP at      Author: Deepti Swan CNP Service: -- Author Type: --    Filed:  Encounter Date: 5/24/2021 Status: (Other)       Non-Opioid Controlled Substance Agreement    This is an agreement between you and your provider regarding safe and appropriate controlled substance prescribing.? Controlled substances are?medicines that can cause physical and mental dependence. The manufacturing, possession and use of these medicines are regulated by law.  We here at [facility] are making a commitment to work with you in your efforts to get better.? To support you in this work, we will help you schedule regular appointments for medicine refills. If we must cancel or change your appointment for any reason, we will make sure you have enough medication to last until your next appointment.      As a Provider, I will:     Listen carefully to your concerns while treating you with dignity.     Recommend a treatment plan that I believe is in your best interest and may involve therapies other than medication.      Review the chance of benefit and the chance of harm of this medicine with you regularly and evaluate the safety and effectiveness of this therapy.       Provide a plan on how to discontinue if the decision is made to stop this medicine.       As a Patient, I understand controlled substances:       Are prescribed by my care provider to help me function or work and manage my condition(s).?    Are strong medicines and can cause serious side effects.      Need to be taken exactly as prescribed.?Combining controlled substances with certain medicines or chemicals (such as illegal drugs, alcohol, sedatives, sleeping pills, and benzodiazepines) can be dangerous or even fatal.? If I stop taking my medicines suddenly, I may have severe withdrawal symptoms.     The risks, benefits, and side effects of these medicine(s) were explained to me. I agree that:    1. I will take part in other treatments as  advised by my care team. This may be psychiatry or counseling, physical therapy, behavioral therapy, group treatment or a referral to specialist.    2. I will keep all my appointments and understand this is part of the monitoring of controlled substance.?My care team may require an office visit for EVERY controlled substance refill. If I miss appointments or dont follow instructions, my care team may stop my medicine    3. I will take my medicines as prescribed. I will not change the dose or schedule unless my care team tells me to. There will be no refills if I run out early.      4. I may be contactedwithout warning and asked to complete a urine drug test or pill count at any time. If I dont give a urine sample or participate in a pill count, the care team may stop my medicine.    5. I will only receive controlled substance prescriptions from this clinic. If treated by another provider, I will let them know I am taking controlled substances and that I have a treatment agreement with this provider. I will inform this care team within one business day if I am given a prescription for any controlled substance by another healthcare provider. This care team may contact other providers and pharmacists about my use of the medicines.     6. It is up to me to make sure that I do not run out of my medicines on weekends or holidays.?If my care team is willing to refill my prescription without a visit, I must request refills only during office hours. Refills may take up to 3 business days to process.  I will use one pharmacy to fill all my controlled substance prescriptions.  I will notify the clinic if any changes are made due to insurance changes or medication availability.    7. I am responsible for my prescriptions. If the medicine/prescription is lost, stolen or destroyed, it will not be replaced.?I also agree not to share controlled substance medicines with anyone.     8. I am aware I should not use any illegal or  recreational drugs. I agree not to drink alcohol unless my care team says I can.     9. If I enroll in the [place] Medical Cannabis program, I will tell my care team.?    10. I will tell my care team right away if I become pregnant, have a new medical problem treated outside of my regular clinic, or have a change in my medications.     11. I understand that this medicine can affect my thinking, judgment and reaction time.? Alcohol and drugs affect the brain and body, which can affect the safety of a persons driving. Being under the influence of alcohol or drugs can affect a persons decision-making, behaviors, personal safety, and the safety of others. Driving while impaired (DWI) can occur if a person is driving, operating, or in physical control of a car, motorcycle, boat, snowmobile, ATV, motorbike, off-road vehicle, or any other motor vehicle ([place] Statute 169A.20). I understand the risk if I choose to drive or operate machinery  I understand that if I do not follow any of the conditions above, my prescriptions or treatment may be stopped or changed.     I agree that my provider, clinic care team, and pharmacy may work with any city, state or federal law enforcement agency that investigates the misuse, sale, or other diversion of my controlled medicine. I will allow my provider to discuss my care with or share a copy of this agreement with any other treating provider, pharmacy or emergency room where I receive care.?    I have read this agreement and have asked questions about anything I did not understand.    ________________________________________________________  Patient Signature - Alecia GIBSON Pauser     ___________________                   Date     ________________________________________________________  Provider Signature - Deepti Swan CNP       ___________________                   Date     ________________________________________________________  Witness Signature (required if provider not present  while patient signing)          ___________________                   Date

## 2021-06-22 NOTE — TELEPHONE ENCOUNTER
Per insurance, Tretinoin 0.0375% is not longer being manufactured. Called pharmacy to see if they were running a different strength. Pharmacy stated that will need a whole new script for a different strength since the one being prescribed is no longer being made. Please send a new script to pharmacy for a different strength. Thank you!!!

## 2021-06-22 NOTE — PROGRESS NOTES
Assessment & Plan   1. ADHD (attention deficit hyperactivity disorder)  Stable on Adderall XR 30mg.  CSA up to date, urine drug screen today. Recheck in six months.   - Drug Abuse 1+, Urine  - dextroamphetamine-amphetamine (ADDERALL XR) 30 MG 24 hr capsule; Take 1 capsule (30 mg total) by mouth daily.  Dispense: 30 capsule; Refill: 0  - dextroamphetamine-amphetamine (ADDERALL XR) 30 MG 24 hr capsule; Take 1 capsule (30 mg total) by mouth daily.  Dispense: 30 capsule; Refill: 0  - dextroamphetamine-amphetamine (ADDERALL XR) 30 MG 24 hr capsule; Take 1 capsule (30 mg total) by mouth daily.  Dispense: 30 capsule; Refill: 0    2. Acne  Will increase topical retinoid strength.  Advised to send a message in 4-6 weeks if no improvement and would increase dose again.  Continue with topical clindamycin daily  - tretinoin 0.0375 % Crea; Apply 1 application topically at bedtime.  Dispense: 35 g; Refill: 2  - clindamycin (CLEOCIN T) 1 % lotion; APPLY 1 APPLICATION IN THE MORNING TOPICALLY TO WHOLE FACE.  Dispense: 60 mL; Refill: 2    3. Anxiety  Well controlled, remains off of medication.      Deepti Swan CNP    Subjective   Chief Complaint:  ADHD    HPI:   Alecia Saldaña is a 23 y.o. female who presents for medication check.      ADHD:  Adderall XR 30mg.  She has taken this medication for many years.  Continues to find it quite effective for her.  H/o anxiety as well. This has been well controlled without medication.      Acne: She has noted worsening acne over the last few months.  Previously fairly well controlled with topical clindamycin and topical retinoid.  She is using a Cetaphil cleanser, no other products in the face.      Allergies:  has No Known Allergies.    SH/FH:  Social History and Family History reviewed and updated.   Tobacco Status:  She  reports that she has been smoking cigarettes.  She has been smoking about 0.50 packs per day. she has never used smokeless tobacco.    Review of Systems:  A complete  head to toe ROS is negative unless otherwise noted in HPI    Objective     Vitals:    12/27/18 1351   BP: 106/68   Patient Site: Left Arm   Patient Position: Sitting   Cuff Size: Adult Regular   Weight: 138 lb (62.6 kg)       Physical Exam:  GENERAL: Alert, well-appearing female  PSYCH: Pleasant mood, affect appropriate.  Good judgment and insight.  Intact recent and remote memory.  Good eye contact.  SKIN:mild to moderate papulopustular acne on face

## 2021-06-25 ENCOUNTER — HOSPITAL ENCOUNTER (OUTPATIENT)
Dept: ULTRASOUND IMAGING | Facility: CLINIC | Age: 26
Discharge: HOME | End: 2021-06-25
Attending: NURSE PRACTITIONER | Admitting: NURSE PRACTITIONER
Payer: COMMERCIAL

## 2021-06-25 DIAGNOSIS — Z3A.01 LESS THAN 8 WEEKS GESTATION OF PREGNANCY: ICD-10-CM

## 2021-06-25 PROCEDURE — 76817 TRANSVAGINAL US OBSTETRIC: CPT | Mod: 26 | Performed by: RADIOLOGY

## 2021-06-25 PROCEDURE — 76801 OB US < 14 WKS SINGLE FETUS: CPT | Mod: 26 | Performed by: RADIOLOGY

## 2021-06-25 PROCEDURE — 76801 OB US < 14 WKS SINGLE FETUS: CPT

## 2021-06-26 NOTE — PROGRESS NOTES
Assessment & Plan   1. Less than 8 weeks gestation of pregnancy  First pregnancy, counseled on prenatal options and currently undecided.  Discussed potential teratogenicity of medications and advised to stop Adderall, lorazepam as well as tretinoin cream. Dating US ordered.    - Pregnancy (Beta-hCG, Qual), Urine  - US OB < 14 Weeks With Transvaginal; Future    2. Tobacco abuse  She has been smoking infrequently and feels she can quit on her own, does not desire tobacco cessation assistance.      3. Attention deficit hyperactivity disorder (ADHD), predominantly inattentive type  Stop stimulant.  She is quite nervous about her ability to complete work without Adderall.  We did discuss other options for management    Instructed to stop teratogens  Discussed safe OTC meds (Tylenol and Benadryl)  Start Prenatal Vitamin  Discussed and Scheduled dating u/s - done ASAP after 6 weeks.    Reviewed Prenatal care options - OB, midwife, family medicine .  Instructed to schedule First OB appointment  Instructed patient to call clinic if cramping /bleeding/spotting.    Recommend scheduling initial OB at 9-10 weeks.     Deepti Swan CNP    Subjective   Chief Complaint:  Confirmation (preg confirmation)    HPI:   Alecia GIBSON Pauser is a 26 y.o. female who presents for pregnancy confirmation.      . She and her partner (Fede) have not been trying to prevent pregnancy for over a year.  They are both excited to have had three positive pregnancy tests yesterday. Feeling well, changes to taste and smell.  Wondering about medication adjustment. Has not started PNV.      LMP: 2021  SILVIA by LMP: 22  GA: 5w4d  Regularity of cycles: regular  Use of contraception: no  Home UPT: positive 6/15/21  Planned or unintended pregnancy - decision counseling:  planned    Symptoms: taste/smell changes.   Prenatal vitamins: No  Immunizations: up to date  Smoking: yes, infrequently  Substance use: no      PMH:   Patient Active Problem List    Diagnosis     Attention deficit hyperactivity disorder     Anxiety       No past medical history on file.    Current Medications:   Current Outpatient Medications on File Prior to Visit   Medication Sig Dispense Refill     clindamycin (CLEOCIN T) 1 % lotion APPLY TOPICALLY TO THE FACE EVERY MORNING 60 mL 3     [START ON 6/26/2021] dextroamphetamine-amphetamine (ADDERALL XR) 30 MG 24 hr capsule Take 1 capsule (30 mg total) by mouth daily. 30 capsule 0     [START ON 7/26/2021] dextroamphetamine-amphetamine (ADDERALL XR) 30 MG 24 hr capsule Take 1 capsule (30 mg total) by mouth daily. 30 capsule 0     [START ON 8/25/2021] dextroamphetamine-amphetamine (ADDERALL XR) 30 MG 24 hr capsule Take 1 capsule (30 mg total) by mouth daily. 30 capsule 0     tretinoin (RETIN-A) 0.025 % cream APPLY EXTERNALLY TO THE AFFECTED AREA DAILY 45 g 3     LORazepam (ATIVAN) 0.5 MG tablet Take 1-2 tablets as needed every six hours for anxiety 30 tablet 1     multivitamin with minerals tablet Take by mouth.       No current facility-administered medications on file prior to visit.        Allergies:  has No Known Allergies.    SH/FH:  Social History and Family History reviewed and updated.   Tobacco Status:  She  reports that she has been smoking cigarettes. She has never used smokeless tobacco.    Review of Systems:  Negative unless otherwise noted in HPI    Objective     Vitals:    06/16/21 1715   BP: 118/64   Patient Site: Left Arm   Patient Position: Sitting   Cuff Size: Adult Regular   Pulse: 100   Temp: 98.1  F (36.7  C)   TempSrc: Tympanic   SpO2: 98%   Weight: 169 lb 14.4 oz (77.1 kg)     Wt Readings from Last 3 Encounters:   06/16/21 169 lb 14.4 oz (77.1 kg)   05/24/21 165 lb (74.8 kg)   04/20/21 168 lb 4 oz (76.3 kg)       Physical Exam:  General:  Alert, well-appearing female.   Psychiatric: Pleasant mood, affect appropriate  HEENT: Grossly  normal  Neck:  Supple, no lymphadenopathy. Thyroid borders smooth without enlargement,  nodules.    CV: RRR, no murmurs  Resp:  Lung sounds clear  Abdomen:  BS+. Abdomen soft, nontender to palpation. No organomegaly.        Labs:    Recent Results (from the past 168 hour(s))   Pregnancy (Beta-hCG, Qual), Urine   Result Value Ref Range    Pregnancy Test, Urine Positive (!) Negative

## 2021-07-03 NOTE — ADDENDUM NOTE
Addendum Note by Norman Alvarado CNP at 1/16/2020 10:00 AM     Author: Norman Alvarado CNP Service: -- Author Type: Nurse Practitioner    Filed: 1/18/2020  7:15 PM Encounter Date: 1/16/2020 Status: Signed    : Norman Alvarado CNP (Nurse Practitioner)    Addended by: NORMAN ALVARADO on: 1/18/2020 07:15 PM        Modules accepted: Orders

## 2021-07-06 ENCOUNTER — TELEPHONE (OUTPATIENT)
Dept: MIDWIFE SERVICES | Facility: CLINIC | Age: 26
End: 2021-07-06

## 2021-07-06 ENCOUNTER — PRENATAL OFFICE VISIT (OUTPATIENT)
Dept: NURSING | Facility: CLINIC | Age: 26
End: 2021-07-06
Payer: COMMERCIAL

## 2021-07-06 VITALS
SYSTOLIC BLOOD PRESSURE: 118 MMHG | HEART RATE: 100 BPM | OXYGEN SATURATION: 98 % | TEMPERATURE: 98.1 F | WEIGHT: 169.9 LBS | DIASTOLIC BLOOD PRESSURE: 64 MMHG | BODY MASS INDEX: 29.16 KG/M2

## 2021-07-06 VITALS
WEIGHT: 165 LBS | DIASTOLIC BLOOD PRESSURE: 80 MMHG | OXYGEN SATURATION: 97 % | HEIGHT: 64 IN | BODY MASS INDEX: 28.17 KG/M2 | HEART RATE: 108 BPM | SYSTOLIC BLOOD PRESSURE: 122 MMHG

## 2021-07-06 VITALS — BODY MASS INDEX: 28.88 KG/M2 | HEIGHT: 64 IN

## 2021-07-06 DIAGNOSIS — F90.9 ATTENTION DEFICIT HYPERACTIVITY DISORDER: Primary | ICD-10-CM

## 2021-07-06 DIAGNOSIS — Z23 NEED FOR TDAP VACCINATION: ICD-10-CM

## 2021-07-06 DIAGNOSIS — Z34.90 SUPERVISION OF NORMAL PREGNANCY: ICD-10-CM

## 2021-07-06 DIAGNOSIS — Z34.00 ENCOUNTER FOR SUPERVISION OF NORMAL FIRST PREGNANCY: Primary | ICD-10-CM

## 2021-07-06 PROBLEM — Z30.017 NEXPLANON INSERTION: Status: RESOLVED | Noted: 2017-06-27 | Resolved: 2021-07-06

## 2021-07-06 PROBLEM — F41.9 ANXIETY: Status: ACTIVE | Noted: 2018-05-08

## 2021-07-06 PROBLEM — F17.200 SMOKER: Status: RESOLVED | Noted: 2017-06-27 | Resolved: 2021-07-06

## 2021-07-06 PROCEDURE — 99207 PR NO CHARGE NURSE ONLY: CPT

## 2021-07-06 RX ORDER — DEXTROAMPHETAMINE SULFATE, DEXTROAMPHETAMINE SACCHARATE, AMPHETAMINE SULFATE AND AMPHETAMINE ASPARTATE 7.5; 7.5; 7.5; 7.5 MG/1; MG/1; MG/1; MG/1
CAPSULE, EXTENDED RELEASE ORAL
COMMUNITY
Start: 2021-05-28

## 2021-07-06 SDOH — HEALTH STABILITY: MENTAL HEALTH: HOW MANY STANDARD DRINKS CONTAINING ALCOHOL DO YOU HAVE ON A TYPICAL DAY?: NOT ASKED

## 2021-07-06 SDOH — HEALTH STABILITY: MENTAL HEALTH: HOW OFTEN DO YOU HAVE A DRINK CONTAINING ALCOHOL?: NOT ASKED

## 2021-07-06 SDOH — HEALTH STABILITY: MENTAL HEALTH: HOW OFTEN DO YOU HAVE 6 OR MORE DRINKS ON ONE OCCASION?: NOT ASKED

## 2021-07-06 ASSESSMENT — PATIENT HEALTH QUESTIONNAIRE - PHQ9
SUM OF ALL RESPONSES TO PHQ QUESTIONS 1-9: 18
SUM OF ALL RESPONSES TO PHQ QUESTIONS 1-9: 3

## 2021-07-06 NOTE — TELEPHONE ENCOUNTER
I do not prescribe adderal. Recommend that she make an appointment with her primary to adjust dose. If she can function on 1/2 dose that would be great. If unable to function without adderal can continue to take w/ consult at [facility].     Dorie Choudhury, CNM, APRN CNM

## 2021-07-06 NOTE — PROGRESS NOTES
Important Information for Provider:     New ob nurse intake by phone, first pregnancy. Handouts reviewed and mailed. Ordered first trimester screening. Ultrasound was performed 6/25/2021, viable pregnancy, consistent with LMP.  Patient stopped her Adderall 6/16/2021 per primary provider, according to providers note they did discuss other options   She is having side effects of being off her Adderall, trouble concentrating on work, depression , anxiety, trouble falling asleep and walking up exhausted, family has noticed she has been off her medication . Patient has been taking Adderall for 8 years. PHQ score is 18   TE sent to [facility] for advise. Told patient to take 1/2 tablet of  Adderall today until she receives advise from [facility].    Caffeine intake/servings daily - 0  Calcium intake/servings daily - 3  Exercise 5 times weekly - describe ; walks, precautions given  Sunscreen used - Yes  Seatbelts used - Yes  Guns stored in the home - No  Self Breast Exam - Yes  Pap test up to date -  Yes  Eye exam up to date -  Yes  Dental exam up to date -  No  Immunizations reviewed and up to date - Yes  Abuse: Current or Past (Physical, Sexual or Emotional) - No  Do you feel safe in your environment - Yes  Do you cope well with stress - Yes  Do you suffer from insomnia - trouble falling asleep,   Last updated by: Loretta Baldwin LPN  7/6/2021             Prenatal OB Questionnaire  Patient supplied answers from flow sheet for:  Prenatal OB Questionnaire.  Past Medical History  Have you ever received care for your mental health? : (!) Yes ADHD  Have you ever been in a major accident or suffered serious trauma?: No  Within the last year, has anyone hit, slapped, kicked or otherwise hurt you?: No  In the last year, has anyone forced you to have sex when you didn't want to?: No    Past Medical History 2   Have you ever received a blood transfusion?: No  Would you accept a blood transfusion if was medically recommended?:  Yes  Does anyone in your home smoke?: No   Is your blood type Rh negative?: Unknown  Have you ever ?: No  Have you been hospitalized for a nonsurgical reason excluding normal delivery?: No  Have you ever had an abnormal pap smear?: No    Past Medical History (Continued)  Do you have a history of abnormalities of the uterus?: No  Did your mother take NOREEN or any other hormones when she was pregnant with you?: No  Do you have any other problems we have not asked about which you feel may be important to this pregnancy?: No         Allergies as of 7/6/2021:    Allergies as of 07/06/2021 - Reviewed 07/06/2021   Allergen Reaction Noted     Seasonal allergies  06/17/2015       Current medications are:  Current Outpatient Medications   Medication Sig Dispense Refill     ADDERALL XR 30 MG 24 hr capsule        Prenatal MV-Min-Fe Fum-FA-DHA (PRENATAL+DHA PO)            Early ultrasound screening tool:    Does patient have irregular periods?  No  Did patient use hormonal birth control in the three months prior to positive urine pregnancy test? No  Is the patient breastfeeding?  No  Is the patient 10 weeks or greater at time of education visit?  No

## 2021-07-06 NOTE — TELEPHONE ENCOUNTER
TC to patient. Discussed message below from Dorie. Pt took 1/2 dose today and felt much better. She will contact FP. I put in a referral for MFM to meet with provider there to discuss taking Adderall during pregnancy. Pt requesting that we send the prenatal  packet to [address], [address], [place] [address].   Nat Rod, RN-BSN

## 2021-07-06 NOTE — TELEPHONE ENCOUNTER
Patient's first pregnancy, around 8 weeks.     Patient was told to stop her Aderall XR immediately when she found out she was pregnant. Patient has been off it for 2 weeks. Having withdrawal symptoms, PHQ scored 18. She is having problems with her work concentrating on things.  Encouraged her to take 1/2 tablet of her Adderall( 30mg XR) today and told her a message would be sent to the provider.  She has been taking Adderall for 8 years. Other medications were suggested to her but not encouraged by her primary due to side effects  She is wondering if she should continue her Adderall XR which she is almost out. Primary discontinued. Or taking something else. Please advise        Pharmacy updated.     Olivia Castellanos LPN

## 2021-07-08 ENCOUNTER — TRANSCRIBE ORDERS (OUTPATIENT)
Dept: MATERNAL FETAL MEDICINE | Facility: CLINIC | Age: 26
End: 2021-07-08

## 2021-07-08 DIAGNOSIS — O26.90 PREGNANCY RELATED CONDITION, ANTEPARTUM: Primary | ICD-10-CM

## 2021-07-08 ASSESSMENT — ANXIETY QUESTIONNAIRES: GAD7 TOTAL SCORE: 4

## 2021-07-20 ENCOUNTER — PRENATAL OFFICE VISIT (OUTPATIENT)
Dept: MIDWIFE SERVICES | Facility: CLINIC | Age: 26
End: 2021-07-20
Payer: COMMERCIAL

## 2021-07-20 ENCOUNTER — TELEPHONE (OUTPATIENT)
Dept: BEHAVIORAL HEALTH | Facility: CLINIC | Age: 26
End: 2021-07-20

## 2021-07-20 VITALS
WEIGHT: 178 LBS | HEIGHT: 64 IN | HEART RATE: 100 BPM | DIASTOLIC BLOOD PRESSURE: 78 MMHG | SYSTOLIC BLOOD PRESSURE: 114 MMHG | BODY MASS INDEX: 30.39 KG/M2

## 2021-07-20 DIAGNOSIS — Z34.01 ENCOUNTER FOR SUPERVISION OF NORMAL FIRST PREGNANCY, FIRST TRIMESTER: Primary | ICD-10-CM

## 2021-07-20 LAB
ABO/RH(D): NORMAL
ALBUMIN UR-MCNC: NEGATIVE MG/DL
ANTIBODY SCREEN: NEGATIVE
APPEARANCE UR: CLEAR
BILIRUB UR QL STRIP: NEGATIVE
COLOR UR AUTO: YELLOW
GLUCOSE UR STRIP-MCNC: NEGATIVE MG/DL
HGB UR QL STRIP: NEGATIVE
KETONES UR STRIP-MCNC: ABNORMAL MG/DL
LEUKOCYTE ESTERASE UR QL STRIP: NEGATIVE
NITRATE UR QL: NEGATIVE
PH UR STRIP: 5.5 [PH] (ref 5–7)
SP GR UR STRIP: >=1.03 (ref 1–1.03)
SPECIMEN EXPIRATION DATE: NORMAL
UROBILINOGEN UR STRIP-ACNC: 0.2 E.U./DL

## 2021-07-20 PROCEDURE — 87340 HEPATITIS B SURFACE AG IA: CPT | Performed by: ADVANCED PRACTICE MIDWIFE

## 2021-07-20 PROCEDURE — 86850 RBC ANTIBODY SCREEN: CPT | Performed by: ADVANCED PRACTICE MIDWIFE

## 2021-07-20 PROCEDURE — 85027 COMPLETE CBC AUTOMATED: CPT | Performed by: ADVANCED PRACTICE MIDWIFE

## 2021-07-20 PROCEDURE — 86780 TREPONEMA PALLIDUM: CPT | Performed by: ADVANCED PRACTICE MIDWIFE

## 2021-07-20 PROCEDURE — 86900 BLOOD TYPING SEROLOGIC ABO: CPT | Performed by: ADVANCED PRACTICE MIDWIFE

## 2021-07-20 PROCEDURE — 86762 RUBELLA ANTIBODY: CPT | Performed by: ADVANCED PRACTICE MIDWIFE

## 2021-07-20 PROCEDURE — 81003 URINALYSIS AUTO W/O SCOPE: CPT | Performed by: ADVANCED PRACTICE MIDWIFE

## 2021-07-20 PROCEDURE — 99203 OFFICE O/P NEW LOW 30 MIN: CPT | Performed by: ADVANCED PRACTICE MIDWIFE

## 2021-07-20 PROCEDURE — 36415 COLL VENOUS BLD VENIPUNCTURE: CPT | Performed by: ADVANCED PRACTICE MIDWIFE

## 2021-07-20 PROCEDURE — 87389 HIV-1 AG W/HIV-1&-2 AB AG IA: CPT | Performed by: ADVANCED PRACTICE MIDWIFE

## 2021-07-20 PROCEDURE — 86901 BLOOD TYPING SEROLOGIC RH(D): CPT | Performed by: ADVANCED PRACTICE MIDWIFE

## 2021-07-20 PROCEDURE — 87086 URINE CULTURE/COLONY COUNT: CPT | Performed by: ADVANCED PRACTICE MIDWIFE

## 2021-07-20 PROCEDURE — 86803 HEPATITIS C AB TEST: CPT | Performed by: ADVANCED PRACTICE MIDWIFE

## 2021-07-20 ASSESSMENT — MIFFLIN-ST. JEOR: SCORE: 1532.4

## 2021-07-20 NOTE — PROGRESS NOTES
"10w3d  Feeling fatigued but otherwise well and happy to be pregnant. Tried to stop adderal for ADD but having a hard time at work. She is using 1/2 dose of aderall on some days at work to get by. Ordered consult with [facility] regarding aderrall in pregnancy with first trimester screen. Elevated PHQ of 18 with new ob. Offered [facility] to reach out and she accepts the referral. Declines pap today as she thinks spotting at this point would make anxiety worse. New OB labs today. Has gained 10lbs this pregnancy but it is related to decreasing aderall and the return of her appetite.     Alecia GIBSON Pauser is a 26 year old female    Pt presents to clinic today for a NOB visit.  Patient's last menstrual period was 2021.. Estimated Date of Delivery: 2022 is calculated from lmp and verified with U/S     She has not had bleeding since her LMP.   She has had mild nausea. Weight loss has not occurred.   This was a planned pregnancy.   FOB is involved,  Fede     OTHER CONCERNS: none    Pt's hx of HSV is negative    ============================================  PERSONAL/SOCIAL HISTORY  Lives lives with their family.  Employment: Full time.  Her job involves sedentary activity .  Exercises no regular exercise program  Pt denies abuse and feels safe in her home and relationships.     REVIEW OF SYSTEMS  C: NEGATIVE for fever, chills, change in weight  I: NEGATIVE for worrisome rashes, moles or lesions  E/M: NEGATIVE for ear, mouth and throat problems  R: NEGATIVE for significant cough or SOB  CV: NEGATIVE for chest pain, palpitations or peripheral edema  GI: NEGATIVE for nausea, abdominal pain, heartburn, or change in bowel habits  : NEGATIVE for frequency, dysuria, or hematuria  PSYCHIATRIC:  NEGATIVE for depression, anxiety or other mental health concerns    PHYSICAL EXAM:  /78 (BP Location: Right arm, Patient Position: Sitting, Cuff Size: Adult Regular)   Pulse 100   Ht 1.626 m (5' 4\")   Wt 80.7 kg (178 lb)   LMP " 2021   BMI 30.55 kg/m    BMI- Body mass index is 30.55 kg/m ., RECOMMENDED WEIGHT GAIN: 25-35 lbs.  GENERAL:  Pleasant pregnant female, alert, cooperative and well groomed.  SKIN:  Warm and dry, without lesions or rashes  HEAD: Symmetrical features.  MOUTH:  Buccal mucosa pink, moist without lesions.  Teeth in good repair.    NECK:  Thyroid without enlargement and nodules.  Lymph nodes not palpable.   LUNGS:  Clear to auscultation.      HEART:  RRR without murmur.  ABDOMEN: Soft without masses , tenderness or organomegaly.  No CVA tenderness.  Uterus palpable at size equal to dates.  No scars noted..  MUSCULOSKELETAL:  Full range of motion  EXTREMITIES:  No edema. No significant varicosities.     PELVIC EXAM: deferred.    GC/CHLAMYDIA CULTURE OBTAINED:PATIENT DECLINED    ASSESSMENT:  Intrauterine pregnancy at 10w3d weeks gestation.     (Z34.01) Encounter for supervision of normal first pregnancy, first trimester  (primary encounter diagnosis)  Comment:   Plan: [facility] - Pregnancy              PLAN:  Oriented to CNM service.    Instructed on use of triage nurse line and contacting the on call CNM after hours for an urgent need such as fever, vagina bleeding, bladder or vaginal infection, rupture of membranes,  or term labor.      Discussed the indications, uses for and false positives for quad screen  and fetal survey ultrasound at 18-20 weeks gestation. Will inform us at the next visit if she wished to avail herself of these screens.    Instructed on best evidence for: weight gain for her weight and height for pregnancy; healthy diet; exercise and activity during pregnancy; and maintenance of a generally healthy lifestyle.     Discussed the harms, benefits, side effects and alternative therapies for current prescribed and OTC medications.    Dorie Choudhury, CAROLEM, APRN CNM

## 2021-07-20 NOTE — TELEPHONE ENCOUNTER
Received referral from CN to assist with patient's anxiety.  Spoke with patient and scheduled a video visit for 7/28 at 2:30pm.     Heather Blancas, [facility]  Behavioral Health Clinician

## 2021-07-21 LAB
HBV SURFACE AG SERPL QL IA: NONREACTIVE
HCV AB SERPL QL IA: NONREACTIVE
HIV 1+2 AB+HIV1 P24 AG SERPL QL IA: NONREACTIVE
RUBV IGG SERPL QL IA: 1.03 INDEX
RUBV IGG SERPL QL IA: ABNORMAL
T PALLIDUM AB SER QL: NONREACTIVE

## 2021-07-22 LAB — BACTERIA UR CULT: NO GROWTH

## 2021-07-22 NOTE — RESULT ENCOUNTER NOTE
Dear Alecia,    Your test results are attached below. All your results are normal and reassuring. However, your rubella immunity test result was equivocal so we will need to redraw this lab the next time you have bloodwork done to see if you are immune or not. If you are not immune, we will recommend you get a rubella booster shot after you have your baby.  If you have any questions, please contact me via [employer] or you can call our office at [phone].    Dorie Mix CNM, APRN GIOVANNI, CNM

## 2021-07-28 ENCOUNTER — VIRTUAL VISIT (OUTPATIENT)
Dept: BEHAVIORAL HEALTH | Facility: CLINIC | Age: 26
End: 2021-07-28
Payer: COMMERCIAL

## 2021-07-28 DIAGNOSIS — F43.23 ADJUSTMENT DISORDER WITH MIXED ANXIETY AND DEPRESSED MOOD: Primary | ICD-10-CM

## 2021-07-28 PROCEDURE — 90791 PSYCH DIAGNOSTIC EVALUATION: CPT | Mod: 95 | Performed by: SOCIAL WORKER

## 2021-07-28 ASSESSMENT — COLUMBIA-SUICIDE SEVERITY RATING SCALE - C-SSRS
ATTEMPT PAST THREE MONTHS: NO
1. IN THE PAST MONTH, HAVE YOU WISHED YOU WERE DEAD OR WISHED YOU COULD GO TO SLEEP AND NOT WAKE UP?: NO
1. IN THE PAST MONTH, HAVE YOU WISHED YOU WERE DEAD OR WISHED YOU COULD GO TO SLEEP AND NOT WAKE UP?: NO
TOTAL  NUMBER OF ABORTED OR SELF INTERRUPTED ATTEMPTS PAST LIFETIME: NO
2. HAVE YOU ACTUALLY HAD ANY THOUGHTS OF KILLING YOURSELF?: NO
TOTAL  NUMBER OF INTERRUPTED ATTEMPTS PAST 3 MONTHS: NO
TOTAL  NUMBER OF INTERRUPTED ATTEMPTS LIFETIME: NO
ATTEMPT LIFETIME: NO
6. HAVE YOU EVER DONE ANYTHING, STARTED TO DO ANYTHING, OR PREPARED TO DO ANYTHING TO END YOUR LIFE?: NO
2. HAVE YOU ACTUALLY HAD ANY THOUGHTS OF KILLING YOURSELF LIFETIME?: NO
TOTAL  NUMBER OF ABORTED OR SELF INTERRUPTED ATTEMPTS PAST 3 MONTHS: NO
6. HAVE YOU EVER DONE ANYTHING, STARTED TO DO ANYTHING, OR PREPARED TO DO ANYTHING TO END YOUR LIFE?: NO

## 2021-07-28 ASSESSMENT — PATIENT HEALTH QUESTIONNAIRE - PHQ9
SUM OF ALL RESPONSES TO PHQ QUESTIONS 1-9: 13
SUM OF ALL RESPONSES TO PHQ QUESTIONS 1-9: 13
10. IF YOU CHECKED OFF ANY PROBLEMS, HOW DIFFICULT HAVE THESE PROBLEMS MADE IT FOR YOU TO DO YOUR WORK, TAKE CARE OF THINGS AT HOME, OR GET ALONG WITH OTHER PEOPLE: VERY DIFFICULT

## 2021-07-28 NOTE — PROGRESS NOTES
[facility]- Integrated Behavioral Health Services  Provider Name:  Heather Blancas     Credentials:  MSW, DESIREESW    PATIENT'S NAME: Alecia GIBSON Pauser  PREFERRED NAME: Wily  PRONOUNS:   she, hers    MRN: [id number]  : 1995  ADDRESS: [address] [address]  ACCT. NUMBER:  [id number]  DATE OF SERVICE: 21  START TIME: 2:39 pm  END TIME: 3:32 pm  PREFERRED PHONE: [phone]  May we leave a program related message: Yes  SERVICE MODALITY:  Video Visit:      Provider verified identity through the following two step process.  Patient provided:  Patient  and Patient address    Telemedicine Visit: The patient's condition can be safely assessed and treated via synchronous audio and visual telemedicine encounter.      Reason for Telemedicine Visit: Services only offered telehealth    Originating Site (Patient Location): Patient's home    Distant Site (Provider Location): Provider Remote Setting- Home Office    Consent:  The patient/guardian has verbally consented to: the potential risks and benefits of telemedicine (video visit) versus in person care; bill my insurance or make self-payment for services provided; and responsibility for payment of non-covered services.     Patient would like the video invitation sent by:  My Chart    Mode of Communication:  Video Conference via [employer]    As the provider I attest to compliance with applicable laws and regulations related to telemedicine.    UNIVERSAL ADULT Mental Health DIAGNOSTIC ASSESSMENT    Identifying Information:  Patient is a 26 year old,   female.  The pronoun use throughout this assessment reflects the patient's chosen pronoun.  Patient was referred for an assessment by Dorie Choudhury CNM .  Patient attended the session alone.     Chief Complaint:   The reason for seeking services at this time is: Patient stated that she was recommended by Dorie Choudhury CNM. Patient stated that she is currently  "pregnant, but is noticing some changes in her mental health. Patient stated that for as long as she can remember, she wanted to be a mother. She shared that now that she is pregnant, she is not feeling physically great, feels like she should be more excited about being pregnant. Patient stated that she is finding it difficult to find motivation to do activities, feels like she is in a rut, and overall is experiencing a decreased mood. Patient stated that she is also finding that she is having anxiety about something happening to her or the baby. Patient stated that with the pregnancy, she was advised to stop taking Adderall, and she also stopped smoking cigarettes. Patient stated that it has been difficult for her to adjust to these changes as well.    The problem(s) began with pregnancy. Patient is currently 11 weeks pregnant. Patient has not attempted to resolve these concerns in the past     Social/Family History:  Patient reported they grew up in [place], [place].  They were raised by biological parents.  Parents stayed .. Patient reported that their childhood was \"great\". She stated that she has one sister who is 12 years older than her.  Patient described their current relationships with family of origin as \"very close\"..      The patient describes their cultural background as \"White, suburban, Zoroastrian attendees on Sundays\". Patient stated that her family is Hungarian and Yi, and her family embraced their Yi identity.  Cultural influences and impact on patient's life structure, values, norms, and healthcare: Social Orientation: family focused, family highly valued.  Contextual influences on patient's health include: Individual Factors currently pregnant, stress at work.    These factors will be addressed in the Preliminary Treatment plan.  Patient identified their preferred language to be English. Patient reported they does not need the assistance of an  or other support involved in therapy. "     Patient reported had no significant delays in developmental tasks.   Patient's highest education level was some college. Patient identified the following learning problems: attention and concentration, first treated for ADHD in college.  Modifications will not be used to assist communication in therapy.   Patient reports they are  able to understand written materials.    Patient reported the following relationship history : 1-2 relationships prior to meeting partner.  Patient's current relationship status is partnered / significant other for 5 years.   Patient identified their sexual orientation as heterosexual.  Patient reported having zero child(robbie). Patient stated that she is currently pregnant, and is due in February 2022.  Patient identified parents, friends and spouse as part of their support system.  Patient identified the quality of these relationships as stable and meaningful.      Patient's current living/housing situation involves staying in own home/apartment. She stated that she and her partner will be moving in with their in-laws in order to save money. She stated that they will have their own separate apartment/space.  They live with her partner, and they report that housing is stable.     Patient is currently employed full time and reports they are able to function appropriately at work..  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 Patient reported that her job is stressful, and has been more challenging without Adderall. Patient stated that she is the first female in her company to have a baby, and they are currently working on writing a parental leave policy for her. Patient reports their finances are obtained through partner.  Patient does not identify finances as a current stressor.      Patient reported that they have been involved with the legal system.  Patient reported that she experienced a law suit with a  previous employer as she did not know that she signed a non-compete agreement which had been violated when she changed jobs. Patient denies being on probation / parole / under the jurisdiction of the court.    Patient's Strengths and Limitations:  Patient identified the following strengths or resources that will help them succeed in treatment: commitment to health and well being, friends / good social support, family support, insight, intelligence and motivation. Things that may interfere with the patient's success in treatment include: none identified.     Personal and Family Medical History:   Patient does report a family history of mental health concerns.  Patient reports family history includes Allergies in her sister; Arthritis in her maternal grandfather, maternal grandmother, paternal aunt, paternal grandfather, and paternal grandmother; Attention Deficit Disorder in her nephew; Cancer in her maternal grandfather; Diabetes in her maternal grandfather and maternal grandmother; Heart Disease in her maternal grandmother; Prostate Cancer in her paternal grandfather..  Nephew experiences anxiety and OCD. Father experiences anxiety, ADHD, and OCD.    Patient does report Mental Health Diagnosis and/or Treatment.  Patient Patient reported the following previous diagnoses which include(s): ADHD, an Anxiety Disorder and Depression.  Patient reported symptoms began : experienced depression in high school, anxiety in college, and was first diagnosed /treated for ADHD in college.  Most recent concerns began after Adderall was discontinued when she became pregnant. Patient has received mental health services in the past: history of therapy in high school, history of medication management.  Psychiatric Hospitalizations: None.  Patient denies a history of civil commitment.  Currently, patient is not receiving other mental health services.  These include none.     Patient has had a physical exam to rule out medical causes for  current symptoms.  Date of last physical exam was within the past year. Client was encouraged to follow up with PCP if symptoms were to develop. The patient has a [facility] Provider, who is named Deepti Swan..  Patient reports no current medical concerns.  Patient denies any issues with pain..   There are not significant appetite / nutritional concerns / weight changes.   Patient does not report a history of head injury / trauma / cognitive impairment.      Patient cannot supply information needed to verify current medications    Medication Adherence:  Patient reports taking prescribed medications as prescribed.    Patient Allergies:    Allergies   Allergen Reactions     Seasonal Allergies        Medical History:    Past Medical History:   Diagnosis Date     Anemia June 2012     Depression, major, single episode, moderate (H) June2012     Uncomplicated asthma      Varicella          Current Mental Status Exam:   Appearance:  Appropriate    Eye Contact:  Good   Psychomotor:  Normal       Gait / station:  no problem  Attitude / Demeanor: Cooperative  Interested Pleasant  Speech      Rate / Production: Normal/ Responsive      Volume:  Normal  volume      Language:  intact  Mood:   Anxious   Affect:   Appropriate    Thought Content: Clear   Thought Process: Coherent  Goal Directed  Logical       Associations: No loosening of associations  Insight:   Good   Judgment:  Intact   Orientation:  All  Attention/concentration: Fair and Easily Distracted    Rating Scales:    PHQ9:    PHQ-9 SCORE 5/24/2021 7/6/2021 7/28/2021   PHQ-9 Total Score - - -   PHQ-9 Total Score MyChart - - 13 (Moderate depression)   PHQ-9 Total Score 3 18 13   ;    GAD7:    IVON-7 SCORE 1/16/2020 5/7/2020 5/24/2021   Total Score 6 8 4     CGI:     First:Considering your total clinical experience with this particular patient population, how severe are the patient's symptoms at this time?: 3 (7/28/2021  7:58 PM)  ;    Most recentCompared to  the patient's condition at the START of treatment, this patient's condition is: 3 (7/28/2021  7:58 PM)      Substance Use:  Patient did not report a family history of substance use concerns; see medical history section for details.  Patient has not received chemical dependency treatment in the past.  Patient has not ever been to detox.      Patient is not currently receiving any chemical dependency treatment. Patient reported the following problems as a result of their substance use:  N/A.    Patient denies using alcohol.  Patient denies using tobacco.  Patient denies using cannabis.  Patient denies using caffeine.   Patient reports using/abusing the following substance(s). Patient reported no other substance use.     CAGE- AID:    CAGE-AID Total Score 7/28/2021   Total Score 0       Substance Use: No symptoms    Based on the negative CAGE score and clinical interview there  are not indications of drug or alcohol abuse.    Significant Losses / Trauma / Abuse / Neglect Issues:   Patient   did not serve in the .  There are indications or report of significant loss, trauma, abuse or neglect issues related to: are no indications and client denies any losses, trauma, abuse, or neglect concerns.  Concerns for possible neglect are not present.     Safety Assessment:   Current Safety Concerns:  [place] Suicide Severity Rating Scale (Lifetime/Recent)  [place] Suicide Severity Rating (Lifetime/Recent) 7/28/2021   1. Wish to be Dead (Lifetime) No   1. Wish to be Dead (Recent) No   2. Non-Specific Active Suicidal Thoughts (Lifetime) No   2. Non-Specific Active Suicidal Thoughts (Recent) No   Actual Attempt (Lifetime) No   Actual Attempt (Past 3 Months) No   Has subject engaged in non-suicidal self-injurious behavior? (Lifetime) No   Has subject engaged in non-suicidal self-injurious behavior? (Past 3 Months) No   Interrupted Attempts (Lifetime) No   Interrupted Attempts (Past 3 Months) No   Aborted or Self-Interrupted  Attempt (Lifetime) No   Aborted or Self-Interrupted Attempt (Past 3 Months) No   Preparatory Acts or Behavior (Lifetime) No   Preparatory Acts or Behavior (Past 3 Months) No   Most Recent Attempt Actual Lethality Code NA   Most Lethal Attempt Actual Lethality Code NA   Initial/First Attempt Actual Lethality Code NA     Patient denies current homicidal ideation and behaviors.  Patient denies current self-injurious ideation and behaviors.    Patient denied risk behaviors associated with substance use.  Patient denies any high risk behaviors associated with mental health symptoms.  Patient reports the following current concerns for their personal safety: None.  Patient reports there   firearms in the house.       The firearms are not secured in a locked space. Client was advised to secure all firearms.    History of Safety Concerns:  Patient denied a history of homicidal ideation.     Patient denied a history of personal safety concerns.    Patient denied a history of assaultive behaviors.    Patient denied a history of sexual assault behaviors.     Patient denied a history of risk behaviors associated with substance use.  Patient denies any history of high risk behaviors associated with mental health symptoms.  Patient reports the following protective factors:      Risk Plan:  See Recommendations for Safety and Risk Management Plan    Review of Symptoms per patient report:  Depression: Change in sleep, Lack of interest, Change in energy level, Difficulties concentrating, Psychomotor slowing or agitation, Feelings of hopelessness, Feelings of helplessness and Irritability  [place]:  No Symptoms  Psychosis: No Symptoms  Anxiety: Excessive worry, Nervousness, Physical complaints, such as headaches, stomachaches, muscle tension, Poor concentration and Irritability  Panic:  No symptoms  Post Traumatic Stress Disorder:  No Symptoms   Eating Disorder: No Symptoms  ADD / ADHD:  No symptoms  Conduct Disorder: No symptoms  Autism  Spectrum Disorder: No symptoms  Obsessive Compulsive Disorder: No Symptoms    Patient reports the following compulsive behaviors and treatment history: No symptoms or concerns.      Diagnostic Criteria:   A. The development of emotional or behavioral symptoms in response to an identifiable stressor(s) occurring within 3 months of the onset of the stressor(s)  B. These symptoms or behaviors are clinically significant, as evidenced by one or both of the following:       - Marked distress that is out of proportion to the severity/intensity of the stressor (with consideration for external context & culture)       - Significant impairment in social, occupational, or other important areas of functioning  C. The stress-related disturbance does not meet criteria for another disorder & is not not an exacerbation of another mental disorder  D. The symptoms do not represent normal bereavement  E. Once the stressor or its consequences have terminated, the symptoms do not persist for more than an additional 6 months       * Adjustment Disorder with Mixed Anxiety and Depressed Mood: The predominant manifestation is a combination of depression and anxiety    Functional Status:  Patient reports the following functional impairments: management of the household and or completion of tasks and self-care.     WHODAS: No flowsheet data found. Sent to patient via [employer] for completion.   Nonprogrammatic care:  Patient is requesting basic services to address current mental health concerns.    Clinical Summary:  1. Reason for assessment: Patient reported onset of anxiety and symptoms of depression with onset of pregnancy after she discontinued her Adderall. Patient stated that she is not sure if symptoms of depression are a result of being pregnant or due to return of depression.   2. Psychosocial, Cultural and Contextual Factors: Currently pregnant, stressful job  3. Principal DSM5 Diagnoses  (Sustained by DSM5 Criteria Listed Above):    Adjustment Disorders  309.28 (F43.23) With mixed anxiety and depressed mood.  4. Other Diagnoses that is relevant to services:  None identified  5. 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 Will monitor and assess if energy and motivation improve as she transitions to second trimester. Patient presents with significant anxiety related to pregnancy, but does not yet meet criteria for time frame to be diagnosed with IVON. Will closely follow and monitor.   6. Prognosis: Expect Improvement.  7. Likely consequences of symptoms if not treated: Worsening symptoms impacting ability to care for self and pregnancy, with heightened risk for mental health complications postpartum.  8. Client strengths include:  educated, employed, goal-focused, intelligent, motivated, open to learning, support of family, friends and providers and willing to ask questions .     Recommendations:     1. Plan for Safety and Risk Management:   Recommended that patient call 911 or go to the local ED should there be a change in any of these risk factors..   Report to child / adult protection services was NA.     2. Patient's identified cultural concerns will be addressed by continuing to explore connection between culture and values and impact on current anxieties,and mental health symptoms.     3. Initial Treatment will focus on:    Depressed Mood - low energy, lack of motivation  Anxiety - worries about harm happening to her and baby.     4. Resources/Service Plan:    services are not indicated.   Modifications to assist communication are not indicated.   Additional disability accommodations are not indicated.      5. Collaboration:   Collaboration / coordination of treatment will be initiated with the following  support  professionals: midwife group at referring clinic.      6.  Referrals:   The following referral(s) will be initiated: Therapy with JA Salcedo, [facility]. Next Scheduled Appointment: 8/5.     A Release of Information has been obtained for the following: no CHARLOTTE needed at this time.    7. COMPA:    COMPA:  Discussed the impact of drugs and alcohol when used during pregnancy. Provider gave patient printed information about the effects of chemical use on their health and well being. Recommendations:  No additional recommendations needed .     8. Records:   These were reviewed at time of assessment.   Information in this assessment was obtained from the medical record and  provided by patient who is a good historian.    Patient will have open access to their mental health medical record.        Provider Name/ Credentials:  JA Salcedo  July 28, 2021

## 2021-07-29 ASSESSMENT — PATIENT HEALTH QUESTIONNAIRE - PHQ9: SUM OF ALL RESPONSES TO PHQ QUESTIONS 1-9: 13

## 2021-08-05 ENCOUNTER — VIRTUAL VISIT (OUTPATIENT)
Dept: BEHAVIORAL HEALTH | Facility: CLINIC | Age: 26
End: 2021-08-05
Payer: COMMERCIAL

## 2021-08-05 DIAGNOSIS — F43.23 ADJUSTMENT DISORDER WITH MIXED ANXIETY AND DEPRESSED MOOD: Primary | ICD-10-CM

## 2021-08-05 PROCEDURE — 90832 PSYTX W PT 30 MINUTES: CPT | Mod: 95 | Performed by: SOCIAL WORKER

## 2021-08-05 NOTE — PROGRESS NOTES
[facility]- Integrated Behavioral Health Services  August 5, 2021    Behavioral Health Clinician Progress Note    Patient Name: Alecia GIBSON Ambarhugh      Telemedicine Visit: The patient's condition can be safely assessed and treated via synchronous audio and visual telemedicine encounter.      Reason for Telemedicine Visit: Services only offered telehealth    Originating Site (Patient Location): Patient's home    Distant Site (Provider Location): [facility]: [facility]    Consent:  The patient/guardian has verbally consented to: the potential risks and benefits of telemedicine (video visit) versus in person care; bill my insurance or make self-payment for services provided; and responsibility for payment of non-covered services.     Mode of Communication:  Video Conference via Social IQ (Social Influence Quotient)    As the provider I attest to compliance with applicable laws and regulations related to telemedicine.         Service Type:  Individual     Session Start Time: 3:46 pm  Session End Time: 4:21 pm      Session Length: 16 - 37      Attendees: Patient    Visit Activities (Refresh list every visit): [facility] Only    Diagnostic Assessment Date: 7/28/21  Treatment Plan Review Date: 8/5/21  See Flowsheets for today's PHQ-9 and IVON-7 results  Previous PHQ-9:   PHQ-9 SCORE 5/24/2021 7/6/2021 7/28/2021   PHQ-9 Total Score - - -   PHQ-9 Total Score MyChart - - 13 (Moderate depression)   PHQ-9 Total Score 3 18 13     Previous IVON-7:   IVON-7 SCORE 1/16/2020 5/7/2020 5/24/2021   Total Score 6 8 4       TATI LEVEL:  No flowsheet data found.    DATA  Extended Session (60+ minutes): No  Interactive Complexity: No  Crisis: No  [facility] Patient: No    Treatment Objective(s) Addressed in This Session:  Target Behavior(s): mental health management    Depressed Mood: Decrease frequency and intensity of feeling down, depressed, hopeless  Feel less tired and more energy during the day   Anxiety: will  experience a reduction in anxiety and will develop more effective coping skills to manage anxiety symptoms    Current Stressors / Issues:  Patient reported that overall she is feeling better. She stated that she is tired but is feeling like she is getting energy the more she completes activities. She shared that it has been helpful for her to have her cousin in town as it has helped her to have activities to look forward to after work. Discussed concept of activity scheduling, and patient in agreement that it may be helpful to have something to look forward to doing every few days. Patient stated that she had been feeling left out as she is not attending happy hours with friends, but is trying to focus on socializing with others even if she cannot drink.  She shared that she is also starting to feel more happiness and excitement about the pregnancy when is notable since she was previously concerned about lack of happiness/excitement despite it being a wanted pregnancy.     Patient stated that her anxiety is overall better, is having fewer fears of something happening to the baby. Patient reported decrease in situations where she wakes up in a panic.  Introduced grounding techniques to utilize if these thoughts and feelings return, and helped patient to practice cognitive restructuring techniques if she is worrying about worst case scenarios happening.     Progress on Treatment Objective(s) / Homework:  New Objective established this session - PREPARATION (Decided to change - considering how); Intervened by negotiating a change plan and determining options / strategies for behavior change, identifying triggers, exploring social supports, and working towards setting a date to begin behavior change    Motivational Interviewing    MI Intervention: Expressed Empathy/Understanding, Permission to raise concern or advise, Open-ended questions, Change talk (evoked) and Reframe     Change Talk Expressed by the Patient:  Reasons to change Need to change Committment to change    Provider Response to Change Talk: E - Evoked more info from patient about behavior change, A - Affirmed patient's thoughts, decisions, or attempts at behavior change, R - Reflected patient's change talk and S - Summarized patient's change talk statements    Cognitive Behavioral Therapy: Discussed connection between thoughts, feelings, and behaviors. Taught patient how to identify cognitive distortions, and assisted patient to identify own cognitive distortions. Taught and engaged patient in cognitive restructuring techniques.     Mindfulness-Based Strategies    Discussed skills based on development and application of mindfulness    Also provided psychoeducation about behavioral health condition, symptoms, and treatment options    Care Plan review completed: Yes    Medication Review:  No current psychiatric medications prescribed    Medication Compliance:  NA    Changes in Health Issues:   None reported    Chemical Use Review:   Substance Use: Chemical use reviewed, no active concerns identified      Tobacco Use: No current tobacco use.      Assessment: Current Emotional / Mental Status (status of significant symptoms):  Risk status (Self / Other harm or suicidal ideation)  Patient denies a history of suicidal ideation, suicide attempts, self-injurious behavior, homicidal ideation, homicidal behavior and and other safety concerns  Patient denies current fears or concerns for personal safety.  Patient denies current or recent suicidal ideation or behaviors.  Patient denies current or recent homicidal ideation or behaviors.  Patient denies current or recent self injurious behavior or ideation.  Patient denies other safety concerns.  A safety and risk management plan has not been developed at this time, however patient was encouraged to call [facility] should there be a change in any of these risk factors.    Appearance:   Appropriate   Eye  Contact:   Good   Psychomotor Behavior: Restless   Attitude:   Cooperative   Orientation:   All  Speech   Rate / Production: Normal    Volume:  Normal   Mood:    Anxious   Affect:    Appropriate   Thought Content:  Clear   Thought Form:  Coherent  Logical   Insight:    Good     Diagnoses:  1. Adjustment disorder with mixed anxiety and depressed mood        Collateral Reports Completed:  Not Applicable    Plan: (Homework, other):  Patient was given information about behavioral services and encouraged to schedule a follow up appointment with the clinic [facility] in 3 weeks.  She was also given Cognitive Behavioral Therapy skills to practice when experiencing anxiety and depression.  CD Recommendations: No indications of CD issues.  Heathernas Blancas, LICSW      ______________________________________________________________________    Integrated Primary Care Behavioral Health Treatment Plan    Patient's Name: Alecia Saldaña  YOB: 1995    Date: 8/5/21    DSM-V Diagnoses: Adjustment Disorders  309.24 (F43.22) With anxiety  Psychosocial / Contextual Factors: Currently pregnant, stressful job  WHODAS:   No flowsheet data found. Sent to patient to complete via Cellvine, not yet returned.       Referral / Collaboration:  Referral to another professional/service is not indicated at this time..    Anticipated number of session or this episode of care: 6-8 throughout pregnancy      MeasurableTreatment Goal(s) related to diagnosis / functional impairment(s)  Goal 1: Patient will reduce symptoms of depression as evidenced by decreased scores on PHQ9 from 13 to 7.     I will know I've met my goal when I have more energy and feel more excitement again.      Objective #A (Patient Action)    Patient will Decrease frequency and intensity of feeling down, depressed, hopeless.  Status: New - Date: 8/5/21     Intervention(s)  [facility] will assign homework to assist with activity scheduling and behavioral activation.    Objective  #B  Patient will Identify negative self-talk and behaviors: challenge core beliefs, myths, and actions.  Status: New - Date: 8/5/21     Intervention(s)  [facility] will teach CBT to assist with restructuring unhelpful and inaccurate core beliefs and self-talk.        Goal 2: Patient will reduce symptoms of anxiety as evidenced by decreased reports of worrying about worst case scenarios.    I will know I've met my goal when I worry less about something happening to baby.      Objective #A (Patient Action)    Status: New - Date: 8/5/21     Patient will use cognitive strategies identified in therapy to challenge anxious thoughts.    Intervention(s)  [facility] will teach cognitive restructuring and defusion techniques.    Objective #B  Patient will identify three distraction and diversion activities and use those activities to decrease level of anxiety  .    Status: New - Date: 8/6/21     Intervention(s)  [facility] will teach distress tolerance, distraction techniques, and mindfulness skills.  ]  Patient has reviewed and agreed to the above plan.      Heather Blancas, [facility]  August 5, 2021

## 2021-08-06 ENCOUNTER — PRE VISIT (OUTPATIENT)
Dept: MATERNAL FETAL MEDICINE | Facility: HOSPITAL | Age: 26
End: 2021-08-06

## 2021-08-10 ENCOUNTER — VIRTUAL VISIT (OUTPATIENT)
Dept: PHARMACY | Facility: CLINIC | Age: 26
End: 2021-08-10
Payer: COMMERCIAL

## 2021-08-10 DIAGNOSIS — Z3A.13 PREGNANCY WITH 13 COMPLETED WEEKS GESTATION: ICD-10-CM

## 2021-08-10 DIAGNOSIS — F90.9 ATTENTION DEFICIT HYPERACTIVITY DISORDER (ADHD), UNSPECIFIED ADHD TYPE: Primary | ICD-10-CM

## 2021-08-10 PROCEDURE — 99605 MTMS BY PHARM NP 15 MIN: CPT | Performed by: PHARMACIST

## 2021-08-10 PROCEDURE — 99607 MTMS BY PHARM ADDL 15 MIN: CPT | Performed by: PHARMACIST

## 2021-08-10 RX ORDER — CALCIUM CARBONATE 500 MG/1
1 TABLET, CHEWABLE ORAL 2 TIMES DAILY PRN
COMMUNITY

## 2021-08-10 NOTE — PROGRESS NOTES
Medication Therapy Management (MTM) Encounter    ASSESSMENT:                            Medication Adherence/Access: No issues identified    Pregnancy with ADHD:   Use of prescription stimulants in pregnancy have not been associated with an increased risk of congenital defects.  Risks may include increased risk of  labor,  delivery, SGA;  Increased BP in mom and potential for increased risk of preeclampsia is also possible.  However, this has to be balanced with risks of poor mental health and increased stress of poor ADHD control.  Patient would benefit from occasional use of the lowest effective stimulant use in pregnancy to balance her mental health while minimizing risk.      PLAN:                            Patient was educated on the following:    Use of prescription stimulants in pregnancy have not been associated with an increased risk of birth defects.  There may be an increased risk of  labor,  delivery and low birth weight when stimulants are used in pregnancy.  Stimulants can also increased BP in you as the mom, so this needs to be monitored carefully.   However, this has to be balanced with risks of poor mental health and the increased stress of poor ADHD control.      I would recommend that you discuss the occasional use of the lowest effective Adderall dose to help with the busiest work days with your primary care provider.  An Adderall immediate release 10 mg tablet would give you the flexibility to use 5 mg (1/2 tab) or up to 20 mg (2 tablets) when needed (~2-3x/week).   Overall, this will be a decrease in your dose compared to pre-pregnancy, but could provide support.            Follow-up: No follow-ups on file.      SUBJECTIVE/OBJECTIVE:                          Alecia Saldaña is a 26 year old female called for an initial visit. She was referred to me from [facility] team.      Reason for visit: stimulant use in pregnancy.    Allergies/ADRs: Reviewed in chart  Past Medical  "History: Reviewed in chart  Tobacco: She reports that she quit smoking about 6 weeks ago. Her smoking use included cigarettes. She started smoking about 9 years ago. She smoked 0.50 packs per day. She has never used smokeless tobacco.  Alcohol: not currently using      Medication Adherence/Access: no issues reported    Pregnancy with ADD: found out she was pregnant at ~5 weeks gestation;  Currently 13w3d.   Was told to stop the stimulant \"cold turkey\"  Had been on Adderall for 8.5 years for ADHD.  Initially had depression, can't focus on anything;  When she saw the CNM for ob care, they referred her to [facility] for further discussion of risk/benefit for stimulant use in pregnancy.   Patient has remained off of the stimulant mostly, depressive symptoms are somewhat better, but continues to have a lack of interest in anything, lack of focus - \"can't even watch TV\", \"work is a struggle to keep up\".  She has occasionally taking taken 1/4 - 1/2 capsule (which is the Adderall XR 30mg) for busy work day -- although this is a lower dose, she does articulate that this is really helpful for her.  She feels nervous about taking it, wants both her and her baby to be healthy.      Today's Vitals: LMP 05/08/2021   ----------------      I spent 25 minutes with this patient today.  A copy of the visit note was provided to the patient's primary care and referring provider.    The patient was sent via Snipd a summary of these recommendations.     Heather Addison, Pharm.D., [facility]      Telemedicine Visit Details  Type of service:  Telephone visit  Start Time: 2:30 PM  End Time: 2:55 PM  Originating Location (patient location): Home  Distant Location (provider location):  [facility]     Medication Therapy Recommendations  Attention deficit hyperactivity disorder    Current Medication: ADDERALL XR 30 MG 24 hr capsule   Rationale: Untreated condition - Needs additional medication therapy - Indication   Recommendation: " Start Medication   Status: Contact Provider - Awaiting Response   Note: start low dose prn adderall

## 2021-08-10 NOTE — PATIENT INSTRUCTIONS
Recommendations from today's MTM visit:                                                    MTM (medication therapy management) is a service provided by a clinical pharmacist designed to help you get the most of out of your medicines.      Use of prescription stimulants in pregnancy have not been associated with an increased risk of birth defects.  There may be an increased risk of  labor,  delivery and low birth weight when stimulants are used in pregnancy.  Stimulants can also increased BP in you as the mom, so this needs to be monitored carefully.   However, this has to be balanced with risks of poor mental health and the increased stress of poor ADHD control.      I would recommend that you discuss the occasional use of the lowest effective Adderall dose to help with the busiest work days with your primary care provider.  An Adderall immediate release 10 mg tablet would give you the flexibility to use 5 mg (1/2 tab) or up to 20 mg (2 tablets) when needed (~2-3 x/week).   Overall, this will be a decrease in your dose compared to pre-pregnancy, but could provide support.        Follow-up: as needed;   See MFM as scheduled tomorrow    It was great to speak with you today.  I value your experience and would be very thankful for your time with providing feedback on our clinic survey. You may receive a survey via email or text message in the next few days.     To schedule another MTM appointment, please call the clinic directly or you may call the MTM scheduling line at [phone] or toll-free at [phone].     My Clinical Pharmacist's contact information:                                                      Please feel free to contact me with any questions or concerns you have.      Heather Addison, Pharm.D., [facility]  Phone:  [phone]

## 2021-08-10 NOTE — Clinical Note
JAMILA -- please see my note;  patient will see the [facility] MD's tomorrow.   Let me know if you have any questions!

## 2021-08-11 ENCOUNTER — OFFICE VISIT (OUTPATIENT)
Dept: MATERNAL FETAL MEDICINE | Facility: HOSPITAL | Age: 26
End: 2021-08-11
Attending: ADVANCED PRACTICE MIDWIFE
Payer: COMMERCIAL

## 2021-08-11 ENCOUNTER — ANCILLARY PROCEDURE (OUTPATIENT)
Dept: ULTRASOUND IMAGING | Facility: HOSPITAL | Age: 26
End: 2021-08-11
Attending: ADVANCED PRACTICE MIDWIFE
Payer: COMMERCIAL

## 2021-08-11 ENCOUNTER — LAB (OUTPATIENT)
Dept: LAB | Facility: HOSPITAL | Age: 26
End: 2021-08-11
Attending: ADVANCED PRACTICE MIDWIFE
Payer: COMMERCIAL

## 2021-08-11 DIAGNOSIS — O26.90 PREGNANCY RELATED CONDITION, ANTEPARTUM: ICD-10-CM

## 2021-08-11 DIAGNOSIS — Z34.90 SUPERVISION OF NORMAL PREGNANCY: Primary | ICD-10-CM

## 2021-08-11 DIAGNOSIS — F90.9 ATTENTION DEFICIT HYPERACTIVITY DISORDER (ADHD), UNSPECIFIED ADHD TYPE: Primary | ICD-10-CM

## 2021-08-11 DIAGNOSIS — Z34.90 SUPERVISION OF NORMAL PREGNANCY: ICD-10-CM

## 2021-08-11 PROCEDURE — 36415 COLL VENOUS BLD VENIPUNCTURE: CPT

## 2021-08-11 PROCEDURE — 76805 OB US >/= 14 WKS SNGL FETUS: CPT | Mod: 26 | Performed by: OBSTETRICS & GYNECOLOGY

## 2021-08-11 PROCEDURE — 99202 OFFICE O/P NEW SF 15 MIN: CPT | Mod: 25 | Performed by: OBSTETRICS & GYNECOLOGY

## 2021-08-11 PROCEDURE — 96040 HC GENETIC COUNSELING, EACH 30 MINUTES: CPT | Performed by: GENETIC COUNSELOR, MS

## 2021-08-11 PROCEDURE — 76805 OB US >/= 14 WKS SNGL FETUS: CPT

## 2021-08-11 NOTE — PROGRESS NOTES
Please see the imaging tab for details of the ultrasound performed today.    Rhiannon Suresh MD  Specialist in Maternal-Fetal Medicine

## 2021-08-11 NOTE — PROGRESS NOTES
[facility] [place]  Genetic Counseling Consult    Patient: Alecia Saldaña YOB: 1995   Date of Service: 21      Alecia Saldaña was seen at [facility] for genetic consultation to discuss the options for routine screening for fetal chromosome abnormalities.       Impression/Plan:   1.  Alecia had an ultrasound and blood draw for NIPT (through [facility]).  Results are expected within 7-10 days, and will be available in Quick Key.  We will contact her to discuss the results, and a copy will be forwarded to the office of the referring OB provider. Alecia requested a detailed message with results on her voicemail ([phone]). She does not want fetal sex information on her voicemail.     2. Maternal serum AFP (single marker screen) is recommended after 15 weeks to screen for open neural tube defects. A quad screen should not be performed.    Pregnancy History:   /Parity:    Age at Delivery: 27 year old  SILVIA: 2022, by Last Menstrual Period  Gestational Age: 13w4d    No significant complications were reported in the current pregnancy.    Alecia was taking 30mg of Adderall for the treatment of ADHD in pregnancy. She reported that she discontinued using this medication regularly but takes a smaller dose occasionally when she needs to focus at work.Alecia also indicated that she spoke with a pharmacist yesterday , and was instructed to discontinue the 30 mg but was not provided recommendations on an alternative dosage.     Adderall is a category C medication and there are limited controlled studies regarding Adderall exposure throughout pregnancy. Some studies suggested increase low birth weight in infants exposed to Adderall throughout pregnancy. Other studies have not reported an increased risk for congenital birth defects with Adderall exposure during pregnancy. We discussed that even if certain medications have  been associated with adverse fetal outcome, the benefits may still outweigh the risks and this can only be determined by her health care providers.          Family History:   A three-generation pedigree was obtained, and is scanned under the  Media  tab.   The following significant findings were reported by Alecia:    Alecia reported that her partner's first cousin once removed was born with a hole in his heart. Congenital heart defects can be isolated or associated with multiple birth defects (syndromic).There are many genetic syndromes, single gene disorders or chromosomal abnormalities, that can be associated with congenital heart defects. Isolated congenital heart defects are usually a multi-factorial condition caused by the combination of genetic and environmental factors and familial clustering is not uncommon. Since there is a genetic component to multi-factorial conditions, the recurrence risk is higher for first degree relatives (siblings) than in second degree relatives and decreases with distance in relationship.    Otherwise, the reported family history is negative for multiple miscarriages, stillbirths, birth defects, mental retardation, known genetic conditions, and consanguinity.       Carrier Screening:   The patient reports that she and the father of the pregnancy have  ancestry:      Cystic fibrosis is an autosomal recessive genetic condition that occurs with increased frequency in individuals of  ancestry and carrier screening for this condition is available.  In addition,  screening in the [facility] includes cystic fibrosis.    Expanded carrier screening for mutations in a large panel of genes associated with autosomal recessive conditions including cystic fibrosis, spinal muscular atrophy, and others, is now available.    The patient has declined the carrier screening options reviewed today.       Risk Assessment for Chromosome Conditions:   We explained that  the risk for fetal chromosome abnormalities increases with maternal age. We discussed specific features of common chromosome abnormalities, including Down syndrome, trisomy 13, trisomy 18, and sex chromosome trisomies.      At age 27 at delivery, the midtrimester risk to have a baby with Down syndrome is 1 in 928.     At age 27 at delivery, the midtrimester risk to have a baby with any chromosome abnormality is 1 in 464.        Testing Options:   We discussed the following options:   First trimester screening    First trimester ultrasound with nuchal translucency and nasal bone assessments, maternal plasma hCG, ABELINO-A, and AFP measurement    Screens for fetal trisomy 21, trisomy 13, and trisomy 18    Cannot screen for open neural tube defects; maternal serum AFP after 15 weeks is recommended     Non-invasive Prenatal Testing (NIPT)    Maternal plasma cell-free DNA testing; first trimester ultrasound with nuchal translucency and nasal bone assessment is recommended, when appropriate    Screens for fetal trisomy 21, trisomy 13, trisomy 18, and sex chromosome aneuploidy    Cannot screen for open neural tube defects; maternal serum AFP after 15 weeks is recommended     Chorionic villus sampling (CVS)    Invasive procedure typically performed in the first trimester by which placental villi are obtained for the purpose of chromosome analysis and/or other prenatal genetic analysis    Diagnostic results; >99% sensitivity for fetal chromosome abnormalities    Cannot test for open neural tube defects; maternal serum AFP after 15 weeks is recommended     Genetic Amniocentesis    Invasive procedure typically performed in the second trimester by which amniotic fluid is obtained for the purpose of chromosome analysis and/or other prenatal genetic analysis    Diagnostic results; >99% sensitivity for fetal chromosome abnormalities    AFAFP measurement tests for open neural tube defects     Comprehensive (Level II) ultrasound:  Detailed ultrasound performed between 18-22 weeks gestation to screen for major birth defects and markers for aneuploidy.    We reviewed the benefits and limitations of this testing.  Screening tests provide a risk assessment specific to the pregnancy for certain fetal chromosome abnormalities, but cannot definitively diagnose or exclude a fetal chromosome abnormality.  Follow-up genetic counseling and consideration of diagnostic testing is recommended with any abnormal screening result.      It was a pleasure to be involved with Alecia inman care. Face-to-face time of the meeting was 40 minutes.    Jennifer Rachel MS, [facility]  Maternal Fetal Medicine  [facility]  Phone:[phone]

## 2021-08-16 ENCOUNTER — TELEPHONE (OUTPATIENT)
Dept: MATERNAL FETAL MEDICINE | Facility: HOSPITAL | Age: 26
End: 2021-08-16

## 2021-08-16 LAB — SCANNED LAB RESULT: NORMAL

## 2021-08-16 NOTE — TELEPHONE ENCOUNTER
Left VM for Alecia regarding low risk NIPT. Results indicate a low risk for fetal aneuploidy involving chromosomes 21, 18, 13, or sex chromosomes. Fetal sex was not disclosed per patient wishes. This puts her current pregnancy at low risk for Down syndrome, trisomy 18, trisomy 13 and sex chromosome abnormalities. Although these results are reassuring, this does not replace a standard chromosome analysis from a chorionic villus sampling or amniocentesis.     Plan: Her results are available in her Epic chart for her primary OB to review.    Jennifer Rachel MS, [facility]  Maternal Fetal Medicine  [phone]

## 2021-08-18 ENCOUNTER — PRENATAL OFFICE VISIT (OUTPATIENT)
Dept: MIDWIFE SERVICES | Facility: CLINIC | Age: 26
End: 2021-08-18
Payer: COMMERCIAL

## 2021-08-18 VITALS
HEART RATE: 116 BPM | DIASTOLIC BLOOD PRESSURE: 72 MMHG | OXYGEN SATURATION: 97 % | SYSTOLIC BLOOD PRESSURE: 119 MMHG | BODY MASS INDEX: 32.91 KG/M2 | WEIGHT: 191.7 LBS

## 2021-08-18 DIAGNOSIS — Z34.01 ENCOUNTER FOR SUPERVISION OF NORMAL FIRST PREGNANCY, FIRST TRIMESTER: Primary | ICD-10-CM

## 2021-08-18 PROCEDURE — 99212 OFFICE O/P EST SF 10 MIN: CPT | Performed by: ADVANCED PRACTICE MIDWIFE

## 2021-08-18 NOTE — PROGRESS NOTES
14w4d  Feeling well. Having gender reveal today. Wrote sex results in envelope. Erasto at visit. Uncertain about fetal movement.  Denies leaking of fluid, vaginal bleeding, regular uterine contractions, headache or other concerns.  RTC in 4 wks Brie   [No Acute Distress] : no acute distress [Well Nourished] : well nourished [Well Developed] : well developed [Well-Appearing] : well-appearing [Normal Sclera/Conjunctiva] : normal sclera/conjunctiva [PERRL] : pupils equal round and reactive to light [EOMI] : extraocular movements intact [Normal Outer Ear/Nose] : the outer ears and nose were normal in appearance [Normal Oropharynx] : the oropharynx was normal [No JVD] : no jugular venous distention [Supple] : supple [No Lymphadenopathy] : no lymphadenopathy [Thyroid Normal, No Nodules] : the thyroid was normal and there were no nodules present [No Respiratory Distress] : no respiratory distress  [Clear to Auscultation] : lungs were clear to auscultation bilaterally [No Accessory Muscle Use] : no accessory muscle use [Normal Rate] : normal rate  [Regular Rhythm] : with a regular rhythm [Normal S1, S2] : normal S1 and S2 [No Murmur] : no murmur heard [No Carotid Bruits] : no carotid bruits [No Abdominal Bruit] : a ~M bruit was not heard ~T in the abdomen [No Varicosities] : no varicosities [Pedal Pulses Present] : the pedal pulses are present [No Edema] : there was no peripheral edema [No Extremity Clubbing/Cyanosis] : no extremity clubbing/cyanosis [No Palpable Aorta] : no palpable aorta [Soft] : abdomen soft [Non Tender] : non-tender [Non-distended] : non-distended [No Masses] : no abdominal mass palpated [No HSM] : no HSM [Normal Bowel Sounds] : normal bowel sounds [Normal Posterior Cervical Nodes] : no posterior cervical lymphadenopathy [Normal Anterior Cervical Nodes] : no anterior cervical lymphadenopathy [No CVA Tenderness] : no CVA  tenderness [No Spinal Tenderness] : no spinal tenderness [No Joint Swelling] : no joint swelling [Grossly Normal Strength/Tone] : grossly normal strength/tone [No Rash] : no rash [Normal Gait] : normal gait [Coordination Grossly Intact] : coordination grossly intact [No Focal Deficits] : no focal deficits [Deep Tendon Reflexes (DTR)] : deep tendon reflexes were 2+ and symmetric [Normal Affect] : the affect was normal [Normal Insight/Judgement] : insight and judgment were intact

## 2021-08-29 ENCOUNTER — HEALTH MAINTENANCE LETTER (OUTPATIENT)
Age: 26
End: 2021-08-29

## 2021-09-15 ENCOUNTER — PRENATAL OFFICE VISIT (OUTPATIENT)
Dept: MIDWIFE SERVICES | Facility: CLINIC | Age: 26
End: 2021-09-15
Payer: COMMERCIAL

## 2021-09-15 VITALS
BODY MASS INDEX: 34.19 KG/M2 | DIASTOLIC BLOOD PRESSURE: 75 MMHG | SYSTOLIC BLOOD PRESSURE: 121 MMHG | WEIGHT: 200.3 LBS | HEIGHT: 64 IN | HEART RATE: 104 BPM

## 2021-09-15 DIAGNOSIS — Z34.02 ENCOUNTER FOR SUPERVISION OF NORMAL FIRST PREGNANCY IN SECOND TRIMESTER: Primary | ICD-10-CM

## 2021-09-15 LAB
ERYTHROCYTE [DISTWIDTH] IN BLOOD BY AUTOMATED COUNT: 12.8 % (ref 10–15)
HCT VFR BLD AUTO: 42.4 % (ref 35–47)
HGB BLD-MCNC: 14.2 G/DL (ref 11.7–15.7)
MCH RBC QN AUTO: 30.2 PG (ref 26.5–33)
MCHC RBC AUTO-ENTMCNC: 33.5 G/DL (ref 31.5–36.5)
MCV RBC AUTO: 90 FL (ref 78–100)
PLATELET # BLD AUTO: 192 10E3/UL (ref 150–450)
RBC # BLD AUTO: 4.7 10E6/UL (ref 3.8–5.2)
WBC # BLD AUTO: 9.2 10E3/UL (ref 4–11)

## 2021-09-15 PROCEDURE — 82105 ALPHA-FETOPROTEIN SERUM: CPT | Mod: 90 | Performed by: ADVANCED PRACTICE MIDWIFE

## 2021-09-15 PROCEDURE — 99212 OFFICE O/P EST SF 10 MIN: CPT | Performed by: ADVANCED PRACTICE MIDWIFE

## 2021-09-15 PROCEDURE — 99000 SPECIMEN HANDLING OFFICE-LAB: CPT | Performed by: ADVANCED PRACTICE MIDWIFE

## 2021-09-15 PROCEDURE — 36415 COLL VENOUS BLD VENIPUNCTURE: CPT | Performed by: ADVANCED PRACTICE MIDWIFE

## 2021-09-15 ASSESSMENT — MIFFLIN-ST. JEOR: SCORE: 1633.55

## 2021-09-15 NOTE — PROGRESS NOTES
"A: Alecia is having a girl; her and Erasto are really excited. She is not feeling too much movement yet but she has had an occasional flutter, she isn't sure if this is actual movement. She feels well overall but has felt her belly grow more. Has had pins and needles in her R hand when she wakes up in the morning, concerned this is carpal tunnel. No nausea, vomiting, leakage of fluid, blood or cramping. Recently lost her job recently, feels like she hasn't needed her Adderall since then. Her mood has improved significantly since then, feels like \"a weight has been lifted on her shoulders\". Still has access to food and transportation, has connected with [employer] already. Has support at home from Erasto and family.     P:   -AFP today   -anatomy US scheduled   -recommended hand brace for her carpal tunnel sxs, if sxs worsen, consider PT referral   -return to clinic in 5 weeks for GCT    MIKY Del Angel   I was present with the GIOVANNI student who participated in the service and in the documentation of the services provided.  I have verified the history and personally performed the physical exam and medical decision making, as documented by the student and edited by me.  SHAWN Painting CNM       "

## 2021-09-22 ENCOUNTER — ANCILLARY PROCEDURE (OUTPATIENT)
Dept: ULTRASOUND IMAGING | Facility: HOSPITAL | Age: 26
End: 2021-09-22
Attending: OBSTETRICS & GYNECOLOGY
Payer: COMMERCIAL

## 2021-09-22 ENCOUNTER — OFFICE VISIT (OUTPATIENT)
Dept: MATERNAL FETAL MEDICINE | Facility: HOSPITAL | Age: 26
End: 2021-09-22
Attending: OBSTETRICS & GYNECOLOGY
Payer: COMMERCIAL

## 2021-09-22 DIAGNOSIS — O09.891 MEDICATION EXPOSURE DURING FIRST TRIMESTER OF PREGNANCY: Primary | ICD-10-CM

## 2021-09-22 DIAGNOSIS — O35.9XX0 SUSPECTED FETAL ANOMALY, ANTEPARTUM, SINGLE OR UNSPECIFIED FETUS: ICD-10-CM

## 2021-09-22 DIAGNOSIS — F90.9 ATTENTION DEFICIT HYPERACTIVITY DISORDER (ADHD), UNSPECIFIED ADHD TYPE: ICD-10-CM

## 2021-09-22 PROCEDURE — 99207 PR NO CHARGE LOS: CPT | Performed by: OBSTETRICS & GYNECOLOGY

## 2021-09-22 PROCEDURE — 76811 OB US DETAILED SNGL FETUS: CPT

## 2021-09-22 PROCEDURE — 76811 OB US DETAILED SNGL FETUS: CPT | Mod: 26 | Performed by: OBSTETRICS & GYNECOLOGY

## 2021-09-22 NOTE — PROGRESS NOTES
Alecia Saldaña was seen for an ultrasound today at the Maternal-Fetal Medicine center.      For the details of the ultrasound please see the report which can be found under the imaging tab.      Heather Skaggs MD  , OB/GYN  Maternal-Fetal Medicine  [email or web address]  [phone] (Main MFM Office)  [id number] or [phone] (for 24 hour MFM questions)  [phone] (Pager)

## 2021-09-23 LAB
# FETUSES US: NORMAL
AFP MOM SERPL: 1.18
AFP SERPL-MCNC: 47 NG/ML
AGE - REPORTED: 27.1 YR
CURRENT SMOKER: NO
FAMILY MEMBER DISEASES HX: NO
GA METHOD: NORMAL
GA: NORMAL WK
IDDM PATIENT QL: NO
INTEGRATED SCN PATIENT-IMP: NORMAL
SPECIMEN DRAWN SERPL: NORMAL

## 2021-09-25 PROBLEM — Z34.00 SUPERVISION OF NORMAL FIRST PREGNANCY, ANTEPARTUM: Status: ACTIVE | Noted: 2021-09-25

## 2021-10-13 ENCOUNTER — ANCILLARY PROCEDURE (OUTPATIENT)
Dept: ULTRASOUND IMAGING | Facility: HOSPITAL | Age: 26
End: 2021-10-13
Attending: OBSTETRICS & GYNECOLOGY
Payer: MEDICAID

## 2021-10-13 ENCOUNTER — OFFICE VISIT (OUTPATIENT)
Dept: MATERNAL FETAL MEDICINE | Facility: HOSPITAL | Age: 26
End: 2021-10-13
Attending: OBSTETRICS & GYNECOLOGY
Payer: MEDICAID

## 2021-10-13 DIAGNOSIS — O09.891 MEDICATION EXPOSURE DURING FIRST TRIMESTER OF PREGNANCY: Primary | ICD-10-CM

## 2021-10-13 DIAGNOSIS — O35.9XX0 SUSPECTED FETAL ANOMALY, ANTEPARTUM, SINGLE OR UNSPECIFIED FETUS: ICD-10-CM

## 2021-10-13 PROCEDURE — 76816 OB US FOLLOW-UP PER FETUS: CPT

## 2021-10-13 PROCEDURE — 99207 PR NO CHARGE LOS: CPT | Performed by: OBSTETRICS & GYNECOLOGY

## 2021-10-13 PROCEDURE — 76816 OB US FOLLOW-UP PER FETUS: CPT | Mod: 26 | Performed by: OBSTETRICS & GYNECOLOGY

## 2021-10-13 NOTE — PROGRESS NOTES
Please see full imaging report from [employer] program under imaging tab.    Adriel Paniagua MD  Maternal Fetal Medicine

## 2021-10-24 ENCOUNTER — HEALTH MAINTENANCE LETTER (OUTPATIENT)
Age: 26
End: 2021-10-24

## 2021-10-27 ENCOUNTER — PRENATAL OFFICE VISIT (OUTPATIENT)
Dept: MIDWIFE SERVICES | Facility: CLINIC | Age: 26
End: 2021-10-27
Payer: MEDICAID

## 2021-10-27 VITALS
DIASTOLIC BLOOD PRESSURE: 79 MMHG | HEART RATE: 116 BPM | TEMPERATURE: 97.9 F | BODY MASS INDEX: 37.8 KG/M2 | SYSTOLIC BLOOD PRESSURE: 131 MMHG | WEIGHT: 220.2 LBS

## 2021-10-27 DIAGNOSIS — Z34.02 ENCOUNTER FOR SUPERVISION OF NORMAL FIRST PREGNANCY IN SECOND TRIMESTER: Primary | ICD-10-CM

## 2021-10-27 DIAGNOSIS — Z31.41 FERTILITY TESTING: ICD-10-CM

## 2021-10-27 DIAGNOSIS — Z23 NEED FOR PROPHYLACTIC VACCINATION AND INOCULATION AGAINST INFLUENZA: ICD-10-CM

## 2021-10-27 LAB
GLUCOSE 1H P 50 G GLC PO SERPL-MCNC: 97 MG/DL (ref 70–129)
HGB BLD-MCNC: 12.4 G/DL (ref 11.7–15.7)

## 2021-10-27 PROCEDURE — 99212 OFFICE O/P EST SF 10 MIN: CPT | Mod: 25 | Performed by: ADVANCED PRACTICE MIDWIFE

## 2021-10-27 PROCEDURE — 36415 COLL VENOUS BLD VENIPUNCTURE: CPT | Performed by: ADVANCED PRACTICE MIDWIFE

## 2021-10-27 PROCEDURE — 90686 IIV4 VACC NO PRSV 0.5 ML IM: CPT | Performed by: ADVANCED PRACTICE MIDWIFE

## 2021-10-27 PROCEDURE — 86762 RUBELLA ANTIBODY: CPT | Performed by: ADVANCED PRACTICE MIDWIFE

## 2021-10-27 PROCEDURE — 90471 IMMUNIZATION ADMIN: CPT | Performed by: ADVANCED PRACTICE MIDWIFE

## 2021-10-27 PROCEDURE — 82952 GTT-ADDED SAMPLES: CPT | Performed by: ADVANCED PRACTICE MIDWIFE

## 2021-10-27 ASSESSMENT — PATIENT HEALTH QUESTIONNAIRE - PHQ9: SUM OF ALL RESPONSES TO PHQ QUESTIONS 1-9: 12

## 2021-10-27 NOTE — PROGRESS NOTES
24w4d  Patient is feeling well. Positive fetal movement. Denies water leaking, vaginal bleeding, decreased fetal movement, contraction pain, or headaches.   Doing well. Good questions today about planning for the end of pregnancy. Discussed classes through [employer]. Interested in infant 101. Not taking Adderall, struggling some without it but not working so feels like she does not have a good reason for taking it so trying to avoid. Discussed if she takes it to let us know and we can do her growths. Per MFM do growths if taking Adderall, if continues not taking it does not need to do growths. GCT and HGB today. Rubella was equivocal in early pregnancy, repeat today. No other questions or concerns today.   Danger signs reviewed, pre-eclampsia signs and symptoms discussed.   Knows when to call triage and has phone numbers.   Follow up in 4 weeks. Tdap vaccination next visit.   SHAWN Sierra CNM

## 2021-10-28 LAB
RUBV IGG SERPL QL IA: 1.23 INDEX
RUBV IGG SERPL QL IA: POSITIVE

## 2021-11-24 ENCOUNTER — TRANSCRIBE ORDERS (OUTPATIENT)
Dept: MATERNAL FETAL MEDICINE | Facility: CLINIC | Age: 26
End: 2021-11-24

## 2021-11-24 ENCOUNTER — PRENATAL OFFICE VISIT (OUTPATIENT)
Dept: MIDWIFE SERVICES | Facility: CLINIC | Age: 26
End: 2021-11-24
Payer: COMMERCIAL

## 2021-11-24 VITALS
DIASTOLIC BLOOD PRESSURE: 77 MMHG | BODY MASS INDEX: 39.48 KG/M2 | TEMPERATURE: 98.1 F | SYSTOLIC BLOOD PRESSURE: 130 MMHG | HEART RATE: 114 BPM | WEIGHT: 230 LBS

## 2021-11-24 DIAGNOSIS — O36.63X0 EXCESSIVE FETAL GROWTH AFFECTING MANAGEMENT OF PREGNANCY IN THIRD TRIMESTER, SINGLE OR UNSPECIFIED FETUS: ICD-10-CM

## 2021-11-24 DIAGNOSIS — Z34.02 ENCOUNTER FOR SUPERVISION OF NORMAL FIRST PREGNANCY IN SECOND TRIMESTER: Primary | ICD-10-CM

## 2021-11-24 DIAGNOSIS — O26.90 PREGNANCY RELATED CONDITION, ANTEPARTUM: Primary | ICD-10-CM

## 2021-11-24 DIAGNOSIS — N89.8 VAGINAL ITCHING: ICD-10-CM

## 2021-11-24 DIAGNOSIS — Z23 NEED FOR TDAP VACCINATION: ICD-10-CM

## 2021-11-24 PROCEDURE — 90471 IMMUNIZATION ADMIN: CPT | Performed by: ADVANCED PRACTICE MIDWIFE

## 2021-11-24 PROCEDURE — 90715 TDAP VACCINE 7 YRS/> IM: CPT | Performed by: ADVANCED PRACTICE MIDWIFE

## 2021-11-24 NOTE — PROGRESS NOTES
28w4d  Pt presents to clinic for prenatal visit. Feeling well, no concerns. Baby very active. Having some BH contractions, no LOF or bleeding. Has noticed increased vaginal itching but not consistent and responds to comfort measures. Discussed when to get booster, recommend as soon as it has been 6 months since 2nd dose. Pt will make appointment in the next 2 weeks for booster. Fundal height measuring size greater than dates today, feels like a lot of amniotic fluid. Patient has gained 62lbs in this pregnancy. Order in for growth US at [facility]. RTC in 3-4 for US and CNM visit. MML

## 2021-12-15 ENCOUNTER — NURSE TRIAGE (OUTPATIENT)
Dept: NURSING | Facility: CLINIC | Age: 26
End: 2021-12-15
Payer: COMMERCIAL

## 2021-12-15 NOTE — TELEPHONE ENCOUNTER
Triage Call:     Pt Calling to report that she developed a stomach ache and headache last night. Became nauseasted and started throwing last night. Pt has thrown up three times since last night. (2x last night and 1x today)    Diarrhea x1 today  Drinking Gatorade    Pt is pregnant and can still feel fetal movement.     Disposition: Home Care. Pt was given home care for her symptoms.     Anna Parada RN  [facility] Nurse Advisor 2:28 PM 12/15/2021      Reason for Disposition    Vomiting with diarrhea    Additional Information    Negative: Shock suspected (e.g., cold/pale/clammy skin, too weak to stand, low BP, rapid pulse)    Negative: Difficult to awaken or acting confused (e.g., disoriented, slurred speech)    Negative: Sounds like a life-threatening emergency to the triager    Negative: Vomiting occurs only while coughing    Negative: Pregnant < 20 Weeks and nausea/vomiting began in early pregnancy (i.e., 4-8 weeks pregnant)    Negative: Chest pain    Negative: Headache is main symptom    Negative: SEVERE vomiting (e.g., 6 or more times/day) (Exception: patient sounds well, is drinking liquids, does not sound dehydrated, and vomiting has lasted less than 24 hours)    Negative: MODERATE vomiting (e.g., 3 - 5 times/day) and age > 60    Negative: Vomiting contains bile (green color)    Negative: Vomiting red blood or black (coffee ground) material    Negative: Insulin-dependent diabetes and glucose > 240 mg/dL (13 mmol/L)    Negative: Recent head injury (within 3 days)    Negative: Recent abdominal injury (within 7 days)    Negative: Drinking very little and has signs of dehydration (e.g., no urine > 12 hours, very dry mouth, very lightheaded)    Negative: Constant abdominal pain lasting > 2 hours    Negative: High-risk adult (e.g., brain tumor, V-P shunt, hernia)    Negative: Severe pain in one eye    Negative: Patient sounds very sick or weak to the triager    Negative: Vomiting and abdomen looks much more  swollen than usual    Negative: Fever > 100.0 F (37.8 C) and bedridden (e.g., nursing home patient, stroke, chronic illness, recovering from surgery)    Negative: Fever > 100.0 F (37.8 C) and has a weak immune system (e.g., HIV positive, cancer chemo, organ transplant, splenectomy, chronic steroids)    Negative: Fever > 101 F (38.3 C) and over 60 years of age    Negative: Fever > 103 F (39.4 C)    Negative: Taking any of the following medications: digoxin (Lanoxin), lithium, theophylline, phenytoin (Dilantin)    Negative: SEVERE headache and vomiting    Negative: MILD to MODERATE vomiting (e.g., 1-5 times/day) and lasts > 48 hours (2 days)    Negative: Fever present > 3 days (72 hours)    Negative: Patient wants to be seen    Negative: Vomiting a prescription medication    Negative: Alcohol abuse, known or suspected    Negative: Vomiting is a chronic symptom (recurrent or ongoing AND lasting > 4 weeks)    Negative: Vomiting    Protocols used: VOMITING-A-OH

## 2021-12-16 ENCOUNTER — OFFICE VISIT (OUTPATIENT)
Dept: MATERNAL FETAL MEDICINE | Facility: CLINIC | Age: 26
End: 2021-12-16
Attending: ADVANCED PRACTICE MIDWIFE
Payer: COMMERCIAL

## 2021-12-16 ENCOUNTER — PRENATAL OFFICE VISIT (OUTPATIENT)
Dept: MIDWIFE SERVICES | Facility: CLINIC | Age: 26
End: 2021-12-16
Payer: COMMERCIAL

## 2021-12-16 ENCOUNTER — HOSPITAL ENCOUNTER (OUTPATIENT)
Dept: ULTRASOUND IMAGING | Facility: CLINIC | Age: 26
End: 2021-12-16
Attending: ADVANCED PRACTICE MIDWIFE
Payer: COMMERCIAL

## 2021-12-16 VITALS
WEIGHT: 237.7 LBS | OXYGEN SATURATION: 99 % | BODY MASS INDEX: 40.8 KG/M2 | DIASTOLIC BLOOD PRESSURE: 79 MMHG | SYSTOLIC BLOOD PRESSURE: 124 MMHG | HEART RATE: 110 BPM

## 2021-12-16 DIAGNOSIS — O26.90 PREGNANCY RELATED CONDITION, ANTEPARTUM: ICD-10-CM

## 2021-12-16 DIAGNOSIS — O09.891 MEDICATION EXPOSURE DURING FIRST TRIMESTER OF PREGNANCY: ICD-10-CM

## 2021-12-16 DIAGNOSIS — Z36.89 ENCOUNTER FOR ULTRASOUND TO ASSESS INTERVAL GROWTH OF FETUS: Primary | ICD-10-CM

## 2021-12-16 DIAGNOSIS — Z34.03 ENCOUNTER FOR SUPERVISION OF NORMAL FIRST PREGNANCY IN THIRD TRIMESTER: Primary | ICD-10-CM

## 2021-12-16 PROCEDURE — 99212 OFFICE O/P EST SF 10 MIN: CPT | Performed by: ADVANCED PRACTICE MIDWIFE

## 2021-12-16 PROCEDURE — 76811 OB US DETAILED SNGL FETUS: CPT

## 2021-12-16 PROCEDURE — 76811 OB US DETAILED SNGL FETUS: CPT | Mod: 26 | Performed by: OBSTETRICS & GYNECOLOGY

## 2021-12-16 NOTE — PROGRESS NOTES
31w5d  Alecia is doing well today. Had her growth US this morning. Appetite has been okay, has been drinking a lot of fluid, more than usual. Has been craving more fruits and vegetables. Baby is moving well. Denies vaginal bleeding, discharge that's itchy or irritating, severe headaches, leakage of fluids, blurry or double vision, RUQ pain. Baby measuring at 93.6%ile for EFW currently. Would like to see us at the same time as MFM is possible. Has her COVID Booster scheduled tomorrow. Her mother in law was COVID positive a few weeks ago. She is having no symptoms.      Discussed almost 70 lb weight gain this pregnancy, increased thirst and large for gestational age measurement on fundal height, we recommended postprandial and fasting blood glucose testing. She is open to referral to diabetes ed but is not certain she wants to proceed with BG testing, will re-consider this by next visit. Diabetic ed referral placed.    MFM US in 3 weeks. RTC at that time.     MIKY Wolff     I was present with the GIOVANNI student who participated in the service and in the documentation of the services provided. I have verified the history and personally performed the physical exam and medical decision making, as documented by the student and edited by me.    SHAWN Gilbert CNM  December 16, 2021

## 2021-12-16 NOTE — PROGRESS NOTES
"Please see \"Imaging\" tab under \"Chart Review\" for details of today's US.    Nicolasa Randall, DO    "

## 2021-12-17 ENCOUNTER — TELEPHONE (OUTPATIENT)
Dept: MIDWIFE SERVICES | Facility: CLINIC | Age: 26
End: 2021-12-17
Payer: COMMERCIAL

## 2021-12-17 NOTE — TELEPHONE ENCOUNTER
Diabetes Education Scheduling Outreach #1:    Call to patient to schedule. Left message with phone number to call to schedule.    Plan for 2nd outreach attempt within 1 business day.    [facility] [place] [facility]  Diabetes and Nutrition Scheduling

## 2021-12-21 NOTE — TELEPHONE ENCOUNTER
Diabetes Education Scheduling Outreach #2:    Call to patient to schedule. Left message with phone number to call to schedule.    Kathleen Serna  [place] [facility]  Diabetes and Nutrition Scheduling

## 2021-12-22 NOTE — TELEPHONE ENCOUNTER
Diabetes Education Scheduling Outreach #3:    Call to patient to schedule. Left message with phone number to call to schedule.    Kathleen Serna  [place] [facility]  Diabetes and Nutrition Scheduling

## 2021-12-30 ENCOUNTER — PRENATAL OFFICE VISIT (OUTPATIENT)
Dept: MIDWIFE SERVICES | Facility: CLINIC | Age: 26
End: 2021-12-30
Payer: COMMERCIAL

## 2021-12-30 VITALS
SYSTOLIC BLOOD PRESSURE: 119 MMHG | DIASTOLIC BLOOD PRESSURE: 79 MMHG | BODY MASS INDEX: 42.16 KG/M2 | WEIGHT: 245.6 LBS | OXYGEN SATURATION: 98 % | HEART RATE: 100 BPM

## 2021-12-30 DIAGNOSIS — Z34.03 ENCOUNTER FOR SUPERVISION OF NORMAL FIRST PREGNANCY IN THIRD TRIMESTER: Primary | ICD-10-CM

## 2021-12-30 PROCEDURE — 99212 OFFICE O/P EST SF 10 MIN: CPT | Performed by: ADVANCED PRACTICE MIDWIFE

## 2021-12-30 NOTE — PROGRESS NOTES
33w5d  Alecia is here for prenatal visit. Feeling well. Baby is active, denies bleeding, leaking of fluid, headaches. She has MFM U/S for growth next week. Discussed indications for early delivery, and that [facility] could recommend repeat GCT. She is open to whatever recommendations we have. Says that large babies run in her family, and understands that her weight gain in pregnancy can contribute. Discussed some diet modifications that can help, as she is not interested in DE at this time. Also discussed post-meal walk or exercise to help with BG levels. Return to care next week for MFM U/S and to CNM in 2 weeks.  Arsenio Moser CNM

## 2022-01-06 ENCOUNTER — HOSPITAL ENCOUNTER (OUTPATIENT)
Dept: ULTRASOUND IMAGING | Facility: CLINIC | Age: 27
End: 2022-01-06
Attending: OBSTETRICS & GYNECOLOGY
Payer: COMMERCIAL

## 2022-01-06 ENCOUNTER — OFFICE VISIT (OUTPATIENT)
Dept: MATERNAL FETAL MEDICINE | Facility: CLINIC | Age: 27
End: 2022-01-06
Attending: OBSTETRICS & GYNECOLOGY
Payer: COMMERCIAL

## 2022-01-06 DIAGNOSIS — Z36.89 ENCOUNTER FOR ULTRASOUND TO ASSESS INTERVAL GROWTH OF FETUS: ICD-10-CM

## 2022-01-06 DIAGNOSIS — O26.00 EXCESSIVE WEIGHT GAIN AFFECTING PREGNANCY: Primary | ICD-10-CM

## 2022-01-06 DIAGNOSIS — O09.891 MEDICATION EXPOSURE DURING FIRST TRIMESTER OF PREGNANCY: ICD-10-CM

## 2022-01-06 PROCEDURE — 76816 OB US FOLLOW-UP PER FETUS: CPT | Mod: 26 | Performed by: OBSTETRICS & GYNECOLOGY

## 2022-01-06 PROCEDURE — 76816 OB US FOLLOW-UP PER FETUS: CPT

## 2022-01-06 NOTE — PROGRESS NOTES
Alecia Saldaña was seen for an ultrasound today at the Maternal-Fetal Medicine center.      For the details of the ultrasound please see the report which can be found under the imaging tab.      Heather Skaggs MD  , OB/GYN  Maternal-Fetal Medicine  [email or web address]  [phone] (Main MFM Office)  [id number] or [phone] (for 24 hour MFM questions)  [phone] (Pager)

## 2022-01-12 ENCOUNTER — PRENATAL OFFICE VISIT (OUTPATIENT)
Dept: MIDWIFE SERVICES | Facility: CLINIC | Age: 27
End: 2022-01-12
Payer: COMMERCIAL

## 2022-01-12 VITALS
WEIGHT: 246 LBS | BODY MASS INDEX: 42.23 KG/M2 | DIASTOLIC BLOOD PRESSURE: 79 MMHG | OXYGEN SATURATION: 97 % | HEART RATE: 100 BPM | SYSTOLIC BLOOD PRESSURE: 127 MMHG

## 2022-01-12 DIAGNOSIS — Z34.03 ENCOUNTER FOR SUPERVISION OF NORMAL FIRST PREGNANCY IN THIRD TRIMESTER: ICD-10-CM

## 2022-01-12 DIAGNOSIS — O26.00 EXCESSIVE WEIGHT GAIN AFFECTING PREGNANCY: ICD-10-CM

## 2022-01-12 DIAGNOSIS — L29.9 ITCHING: Primary | ICD-10-CM

## 2022-01-12 PROCEDURE — 99213 OFFICE O/P EST LOW 20 MIN: CPT | Performed by: ADVANCED PRACTICE MIDWIFE

## 2022-01-12 RX ORDER — HYDROXYZINE PAMOATE 25 MG/1
25 CAPSULE ORAL 4 TIMES DAILY PRN
Qty: 60 CAPSULE | Refills: 0 | Status: SHIPPED | OUTPATIENT
Start: 2022-01-12 | End: 2022-08-17

## 2022-01-12 NOTE — PROGRESS NOTES
Has itching in lower abdomen with skin stretching and overall PUPPS rash on abdomen.  Few creams have helped.  Will try 1% hydrochlorothiazide on skin and Vistaril 25 mg during day and 50 mg at HS to see if that assists.  Has changed her diet and increased her activity and had minimal wt gain this visit.   Reviewed ctx and has not had any measurable ctx.  Will delay GBS until next visit as primigravida.   ASSESSMENT: 35w4d   LGA infant.    PLAN: RTC in 10 days for GBS culture.   SARAI

## 2022-01-21 ENCOUNTER — PRENATAL OFFICE VISIT (OUTPATIENT)
Dept: MIDWIFE SERVICES | Facility: CLINIC | Age: 27
End: 2022-01-21
Payer: COMMERCIAL

## 2022-01-21 VITALS
SYSTOLIC BLOOD PRESSURE: 144 MMHG | WEIGHT: 251.2 LBS | DIASTOLIC BLOOD PRESSURE: 92 MMHG | TEMPERATURE: 97.8 F | HEART RATE: 115 BPM | BODY MASS INDEX: 43.12 KG/M2 | OXYGEN SATURATION: 99 %

## 2022-01-21 DIAGNOSIS — Z3A.36 36 WEEKS GESTATION OF PREGNANCY: ICD-10-CM

## 2022-01-21 DIAGNOSIS — O16.3 HYPERTENSION DURING PREGNANCY IN THIRD TRIMESTER, UNSPECIFIED HYPERTENSION IN PREGNANCY TYPE: ICD-10-CM

## 2022-01-21 DIAGNOSIS — O09.90 HIGH-RISK PREGNANCY, UNSPECIFIED TRIMESTER: Primary | ICD-10-CM

## 2022-01-21 LAB
ALT SERPL W P-5'-P-CCNC: 16 U/L (ref 0–50)
AST SERPL W P-5'-P-CCNC: 17 U/L (ref 0–45)
CREAT SERPL-MCNC: 0.57 MG/DL (ref 0.52–1.04)
CREAT UR-MCNC: 139 MG/DL
ERYTHROCYTE [DISTWIDTH] IN BLOOD BY AUTOMATED COUNT: 15.3 % (ref 10–15)
GFR SERPL CREATININE-BSD FRML MDRD: >90 ML/MIN/1.73M2
HCT VFR BLD AUTO: 34.5 % (ref 35–47)
HGB BLD-MCNC: 10.8 G/DL (ref 11.7–15.7)
MCH RBC QN AUTO: 26 PG (ref 26.5–33)
MCHC RBC AUTO-ENTMCNC: 31.3 G/DL (ref 31.5–36.5)
MCV RBC AUTO: 83 FL (ref 78–100)
PLATELET # BLD AUTO: 225 10E3/UL (ref 150–450)
PROT UR-MCNC: 0.4 G/L
PROT/CREAT 24H UR: 0.29 G/G CR (ref 0–0.2)
RBC # BLD AUTO: 4.16 10E6/UL (ref 3.8–5.2)
WBC # BLD AUTO: 9.7 10E3/UL (ref 4–11)

## 2022-01-21 PROCEDURE — 82565 ASSAY OF CREATININE: CPT | Performed by: ADVANCED PRACTICE MIDWIFE

## 2022-01-21 PROCEDURE — 87653 STREP B DNA AMP PROBE: CPT | Performed by: ADVANCED PRACTICE MIDWIFE

## 2022-01-21 PROCEDURE — 36415 COLL VENOUS BLD VENIPUNCTURE: CPT | Performed by: ADVANCED PRACTICE MIDWIFE

## 2022-01-21 PROCEDURE — 84460 ALANINE AMINO (ALT) (SGPT): CPT | Performed by: ADVANCED PRACTICE MIDWIFE

## 2022-01-21 PROCEDURE — 84156 ASSAY OF PROTEIN URINE: CPT | Performed by: ADVANCED PRACTICE MIDWIFE

## 2022-01-21 PROCEDURE — 99207 PR PRENATAL VISIT: CPT | Performed by: ADVANCED PRACTICE MIDWIFE

## 2022-01-21 PROCEDURE — 84450 TRANSFERASE (AST) (SGOT): CPT | Performed by: ADVANCED PRACTICE MIDWIFE

## 2022-01-21 PROCEDURE — 85027 COMPLETE CBC AUTOMATED: CPT | Performed by: ADVANCED PRACTICE MIDWIFE

## 2022-01-21 NOTE — PROGRESS NOTES
36w6d  Alecia has elevated BP today. No headache, vision changes, new swelling, RUQ pain. She has gained 83lb with this pregnancy. Baby is active, denies bleeding, leaking of fluid. Discussed GHTN and recommended induction of labor if she is diagnosed by 2 BPs over 140/90 that are 4 weeks apart. Will draw labs today, and consider BP check in triage over the weekend if labs are indicative of changes. Otherwise, plan BP check on Monday.  Arsenio Mosre CNM

## 2022-01-22 LAB
GP B STREP DNA SPEC QL NAA+PROBE: NEGATIVE
PATIENT PENICILLIN, AMOXICILLIN, CEPHALOSPORINS ALLERGY: NO

## 2022-01-24 ENCOUNTER — PRENATAL OFFICE VISIT (OUTPATIENT)
Dept: MIDWIFE SERVICES | Facility: CLINIC | Age: 27
End: 2022-01-24
Payer: COMMERCIAL

## 2022-01-24 VITALS
SYSTOLIC BLOOD PRESSURE: 135 MMHG | HEIGHT: 64 IN | WEIGHT: 254.9 LBS | DIASTOLIC BLOOD PRESSURE: 80 MMHG | BODY MASS INDEX: 43.52 KG/M2

## 2022-01-24 DIAGNOSIS — O99.213 OBESITY AFFECTING PREGNANCY IN THIRD TRIMESTER: Primary | ICD-10-CM

## 2022-01-24 PROCEDURE — 99207 PR PRENATAL VISIT: CPT | Performed by: ADVANCED PRACTICE MIDWIFE

## 2022-01-24 ASSESSMENT — MIFFLIN-ST. JEOR: SCORE: 1876.22

## 2022-01-24 NOTE — PROGRESS NOTES
37w2d   Alecia is doing well today. BP is WNL today. No contractions. Baby is moving normally. Denies vaginal bleeding, discharge that's itchy or irritating, severe headaches, leakage of fluids, blurry or double vision, RUQ pain. She reports knowing that a few of her friends and cousins have had preeclampsia or gestational hypertension so she understands that she might need to be induced. Has a few things to get ready at home still but feels prepared if she needs to be induced at any point.      We discussed the signs and symptoms of preeclampsia and when to call the midwife team or present to OB unit. Given that her blood pressure is WNL, she does not yet meet the diagnostic criteria for gHTN or preeclampsia.   Discussed potential of induction and its implications.   RTC in 2-3 days for BP retake and possible redraw of labs.     MIKY Wolff     I was present with the GIOVANNI student who participated in the service and in the documentation of the services provided. I have verified the history and personally performed the physical exam and medical decision making, as documented by the student and edited by me.    Crystal Crow, SHAWN DAVILA    January 24, 2022

## 2022-01-26 ENCOUNTER — PRENATAL OFFICE VISIT (OUTPATIENT)
Dept: MIDWIFE SERVICES | Facility: CLINIC | Age: 27
End: 2022-01-26
Payer: COMMERCIAL

## 2022-01-26 VITALS
BODY MASS INDEX: 43.72 KG/M2 | WEIGHT: 254.7 LBS | OXYGEN SATURATION: 97 % | HEART RATE: 132 BPM | SYSTOLIC BLOOD PRESSURE: 132 MMHG | DIASTOLIC BLOOD PRESSURE: 83 MMHG

## 2022-01-26 DIAGNOSIS — Z3A.37 37 WEEKS GESTATION OF PREGNANCY: ICD-10-CM

## 2022-01-26 DIAGNOSIS — O09.90 HIGH-RISK PREGNANCY, UNSPECIFIED TRIMESTER: Primary | ICD-10-CM

## 2022-01-26 PROCEDURE — 99207 PR PRENATAL VISIT: CPT | Performed by: ADVANCED PRACTICE MIDWIFE

## 2022-01-26 NOTE — PROGRESS NOTES
37w4d  Alecia is feeling well. Continues to deny headaches, vision changes, RUQ pain, new edema. Baby is active, no bleeding, leaking of fluid, contractions. They are feeling ready at home for this baby. She has a growth U/S next week. Plan for CNM visit on Tues with her growth U/S. Will likely recommend induction of labor at 39w. Return to care next week.  Arsenio Moser, GIOVANNI

## 2022-02-01 ENCOUNTER — OFFICE VISIT (OUTPATIENT)
Dept: MATERNAL FETAL MEDICINE | Facility: CLINIC | Age: 27
End: 2022-02-01
Attending: OBSTETRICS & GYNECOLOGY
Payer: COMMERCIAL

## 2022-02-01 ENCOUNTER — PRENATAL OFFICE VISIT (OUTPATIENT)
Dept: MIDWIFE SERVICES | Facility: CLINIC | Age: 27
End: 2022-02-01
Payer: COMMERCIAL

## 2022-02-01 ENCOUNTER — HOSPITAL ENCOUNTER (INPATIENT)
Facility: CLINIC | Age: 27
LOS: 6 days | Discharge: HOME HEALTH | End: 2022-02-07
Attending: ADVANCED PRACTICE MIDWIFE | Admitting: ADVANCED PRACTICE MIDWIFE
Payer: COMMERCIAL

## 2022-02-01 ENCOUNTER — HOSPITAL ENCOUNTER (OUTPATIENT)
Dept: ULTRASOUND IMAGING | Facility: CLINIC | Age: 27
End: 2022-02-01
Attending: OBSTETRICS & GYNECOLOGY
Payer: COMMERCIAL

## 2022-02-01 VITALS
BODY MASS INDEX: 44.35 KG/M2 | SYSTOLIC BLOOD PRESSURE: 138 MMHG | WEIGHT: 258.4 LBS | DIASTOLIC BLOOD PRESSURE: 92 MMHG | HEART RATE: 106 BPM

## 2022-02-01 DIAGNOSIS — Z36.89 ENCOUNTER FOR ULTRASOUND TO ASSESS INTERVAL GROWTH OF FETUS: ICD-10-CM

## 2022-02-01 DIAGNOSIS — O13.3 GESTATIONAL HYPERTENSION, THIRD TRIMESTER: Primary | ICD-10-CM

## 2022-02-01 DIAGNOSIS — O14.13 SEVERE PRE-ECLAMPSIA IN THIRD TRIMESTER: Primary | ICD-10-CM

## 2022-02-01 DIAGNOSIS — O09.93 HIGH-RISK PREGNANCY, THIRD TRIMESTER: Primary | ICD-10-CM

## 2022-02-01 DIAGNOSIS — Z3A.38 38 WEEKS GESTATION OF PREGNANCY: ICD-10-CM

## 2022-02-01 DIAGNOSIS — O13.3 GESTATIONAL HYPERTENSION, THIRD TRIMESTER: ICD-10-CM

## 2022-02-01 DIAGNOSIS — O26.90 PREGNANCY RELATED CONDITION, ANTEPARTUM: ICD-10-CM

## 2022-02-01 PROBLEM — Z34.90 ENCOUNTER FOR INDUCTION OF LABOR: Status: ACTIVE | Noted: 2022-02-01

## 2022-02-01 LAB
ABO/RH(D): NORMAL
ALT SERPL W P-5'-P-CCNC: 19 U/L (ref 0–50)
ANTIBODY SCREEN: NEGATIVE
AST SERPL W P-5'-P-CCNC: 26 U/L (ref 0–45)
CREAT SERPL-MCNC: 0.69 MG/DL (ref 0.52–1.04)
GFR SERPL CREATININE-BSD FRML MDRD: >90 ML/MIN/1.73M2
HGB BLD-MCNC: 10.4 G/DL (ref 11.7–15.7)
PLATELET # BLD AUTO: 202 10E3/UL (ref 150–450)
SARS-COV-2 RNA RESP QL NAA+PROBE: NEGATIVE
SPECIMEN EXPIRATION DATE: NORMAL

## 2022-02-01 PROCEDURE — 76816 OB US FOLLOW-UP PER FETUS: CPT | Mod: 26 | Performed by: OBSTETRICS & GYNECOLOGY

## 2022-02-01 PROCEDURE — 120N000002 HC R&B MED SURG/OB UMMC

## 2022-02-01 PROCEDURE — 59425 ANTEPARTUM CARE ONLY: CPT | Performed by: ADVANCED PRACTICE MIDWIFE

## 2022-02-01 PROCEDURE — 99207 PR PRENATAL VISIT: CPT | Performed by: ADVANCED PRACTICE MIDWIFE

## 2022-02-01 PROCEDURE — 84450 TRANSFERASE (AST) (SGOT): CPT | Performed by: ADVANCED PRACTICE MIDWIFE

## 2022-02-01 PROCEDURE — 86901 BLOOD TYPING SEROLOGIC RH(D): CPT | Performed by: ADVANCED PRACTICE MIDWIFE

## 2022-02-01 PROCEDURE — 85018 HEMOGLOBIN: CPT | Performed by: ADVANCED PRACTICE MIDWIFE

## 2022-02-01 PROCEDURE — 76816 OB US FOLLOW-UP PER FETUS: CPT

## 2022-02-01 PROCEDURE — 59025 FETAL NON-STRESS TEST: CPT | Mod: 26 | Performed by: ADVANCED PRACTICE MIDWIFE

## 2022-02-01 PROCEDURE — 99232 SBSQ HOSP IP/OBS MODERATE 35: CPT | Performed by: ADVANCED PRACTICE MIDWIFE

## 2022-02-01 PROCEDURE — 250N000011 HC RX IP 250 OP 636: Performed by: ADVANCED PRACTICE MIDWIFE

## 2022-02-01 PROCEDURE — 250N000013 HC RX MED GY IP 250 OP 250 PS 637: Performed by: ADVANCED PRACTICE MIDWIFE

## 2022-02-01 PROCEDURE — 3E0P7VZ INTRODUCTION OF HORMONE INTO FEMALE REPRODUCTIVE, VIA NATURAL OR ARTIFICIAL OPENING: ICD-10-PCS | Performed by: ADVANCED PRACTICE MIDWIFE

## 2022-02-01 PROCEDURE — 86850 RBC ANTIBODY SCREEN: CPT | Performed by: ADVANCED PRACTICE MIDWIFE

## 2022-02-01 PROCEDURE — 85049 AUTOMATED PLATELET COUNT: CPT | Performed by: ADVANCED PRACTICE MIDWIFE

## 2022-02-01 PROCEDURE — 86780 TREPONEMA PALLIDUM: CPT | Performed by: ADVANCED PRACTICE MIDWIFE

## 2022-02-01 PROCEDURE — U0003 INFECTIOUS AGENT DETECTION BY NUCLEIC ACID (DNA OR RNA); SEVERE ACUTE RESPIRATORY SYNDROME CORONAVIRUS 2 (SARS-COV-2) (CORONAVIRUS DISEASE [COVID-19]), AMPLIFIED PROBE TECHNIQUE, MAKING USE OF HIGH THROUGHPUT TECHNOLOGIES AS DESCRIBED BY CMS-2020-01-R: HCPCS | Performed by: ADVANCED PRACTICE MIDWIFE

## 2022-02-01 PROCEDURE — 82565 ASSAY OF CREATININE: CPT | Performed by: ADVANCED PRACTICE MIDWIFE

## 2022-02-01 PROCEDURE — 84460 ALANINE AMINO (ALT) (SGPT): CPT | Performed by: ADVANCED PRACTICE MIDWIFE

## 2022-02-01 RX ORDER — ONDANSETRON 4 MG/1
4 TABLET, ORALLY DISINTEGRATING ORAL EVERY 6 HOURS PRN
Status: DISCONTINUED | OUTPATIENT
Start: 2022-02-01 | End: 2022-02-04 | Stop reason: HOSPADM

## 2022-02-01 RX ORDER — MISOPROSTOL 200 UG/1
400 TABLET ORAL
Status: DISCONTINUED | OUTPATIENT
Start: 2022-02-01 | End: 2022-02-04 | Stop reason: HOSPADM

## 2022-02-01 RX ORDER — OXYTOCIN 10 [USP'U]/ML
10 INJECTION, SOLUTION INTRAMUSCULAR; INTRAVENOUS
Status: DISCONTINUED | OUTPATIENT
Start: 2022-02-01 | End: 2022-02-04 | Stop reason: HOSPADM

## 2022-02-01 RX ORDER — PROCHLORPERAZINE 25 MG
25 SUPPOSITORY, RECTAL RECTAL EVERY 12 HOURS PRN
Status: DISCONTINUED | OUTPATIENT
Start: 2022-02-01 | End: 2022-02-04 | Stop reason: HOSPADM

## 2022-02-01 RX ORDER — MORPHINE SULFATE 10 MG/ML
10 INJECTION, SOLUTION INTRAMUSCULAR; INTRAVENOUS
Status: COMPLETED | OUTPATIENT
Start: 2022-02-01 | End: 2022-02-01

## 2022-02-01 RX ORDER — KETOROLAC TROMETHAMINE 30 MG/ML
30 INJECTION, SOLUTION INTRAMUSCULAR; INTRAVENOUS
Status: ACTIVE | OUTPATIENT
Start: 2022-02-01 | End: 2022-02-06

## 2022-02-01 RX ORDER — HYDROXYZINE HYDROCHLORIDE 25 MG/1
50 TABLET, FILM COATED ORAL EVERY 6 HOURS PRN
Status: DISCONTINUED | OUTPATIENT
Start: 2022-02-01 | End: 2022-02-07 | Stop reason: HOSPADM

## 2022-02-01 RX ORDER — CARBOPROST TROMETHAMINE 250 UG/ML
250 INJECTION, SOLUTION INTRAMUSCULAR
Status: DISCONTINUED | OUTPATIENT
Start: 2022-02-01 | End: 2022-02-04 | Stop reason: HOSPADM

## 2022-02-01 RX ORDER — PROCHLORPERAZINE MALEATE 10 MG
10 TABLET ORAL EVERY 6 HOURS PRN
Status: DISCONTINUED | OUTPATIENT
Start: 2022-02-01 | End: 2022-02-04 | Stop reason: HOSPADM

## 2022-02-01 RX ORDER — MISOPROSTOL 200 UG/1
800 TABLET ORAL
Status: DISCONTINUED | OUTPATIENT
Start: 2022-02-01 | End: 2022-02-04 | Stop reason: HOSPADM

## 2022-02-01 RX ORDER — OXYTOCIN/0.9 % SODIUM CHLORIDE 30/500 ML
100-340 PLASTIC BAG, INJECTION (ML) INTRAVENOUS CONTINUOUS PRN
Status: DISCONTINUED | OUTPATIENT
Start: 2022-02-01 | End: 2022-02-07 | Stop reason: HOSPADM

## 2022-02-01 RX ORDER — OXYTOCIN/0.9 % SODIUM CHLORIDE 30/500 ML
340 PLASTIC BAG, INJECTION (ML) INTRAVENOUS CONTINUOUS PRN
Status: DISCONTINUED | OUTPATIENT
Start: 2022-02-01 | End: 2022-02-04 | Stop reason: HOSPADM

## 2022-02-01 RX ORDER — ONDANSETRON 2 MG/ML
4 INJECTION INTRAMUSCULAR; INTRAVENOUS EVERY 6 HOURS PRN
Status: DISCONTINUED | OUTPATIENT
Start: 2022-02-01 | End: 2022-02-04 | Stop reason: HOSPADM

## 2022-02-01 RX ORDER — NALOXONE HYDROCHLORIDE 0.4 MG/ML
0.4 INJECTION, SOLUTION INTRAMUSCULAR; INTRAVENOUS; SUBCUTANEOUS
Status: DISCONTINUED | OUTPATIENT
Start: 2022-02-01 | End: 2022-02-04 | Stop reason: HOSPADM

## 2022-02-01 RX ORDER — TRANEXAMIC ACID 10 MG/ML
1 INJECTION, SOLUTION INTRAVENOUS EVERY 30 MIN PRN
Status: DISCONTINUED | OUTPATIENT
Start: 2022-02-01 | End: 2022-02-04 | Stop reason: HOSPADM

## 2022-02-01 RX ORDER — METHYLERGONOVINE MALEATE 0.2 MG/ML
200 INJECTION INTRAVENOUS
Status: DISCONTINUED | OUTPATIENT
Start: 2022-02-01 | End: 2022-02-04 | Stop reason: HOSPADM

## 2022-02-01 RX ORDER — METOCLOPRAMIDE 10 MG/1
10 TABLET ORAL EVERY 6 HOURS PRN
Status: DISCONTINUED | OUTPATIENT
Start: 2022-02-01 | End: 2022-02-04 | Stop reason: HOSPADM

## 2022-02-01 RX ORDER — HYDROXYZINE HYDROCHLORIDE 25 MG/1
100 TABLET, FILM COATED ORAL
Status: DISCONTINUED | OUTPATIENT
Start: 2022-02-01 | End: 2022-02-07 | Stop reason: HOSPADM

## 2022-02-01 RX ORDER — MISOPROSTOL 100 UG/1
25 TABLET ORAL EVERY 4 HOURS PRN
Status: DISCONTINUED | OUTPATIENT
Start: 2022-02-01 | End: 2022-02-04 | Stop reason: HOSPADM

## 2022-02-01 RX ORDER — NALOXONE HYDROCHLORIDE 0.4 MG/ML
0.2 INJECTION, SOLUTION INTRAMUSCULAR; INTRAVENOUS; SUBCUTANEOUS
Status: DISCONTINUED | OUTPATIENT
Start: 2022-02-01 | End: 2022-02-04 | Stop reason: HOSPADM

## 2022-02-01 RX ORDER — OXYTOCIN 10 [USP'U]/ML
10 INJECTION, SOLUTION INTRAMUSCULAR; INTRAVENOUS
Status: DISCONTINUED | OUTPATIENT
Start: 2022-02-01 | End: 2022-02-07 | Stop reason: HOSPADM

## 2022-02-01 RX ORDER — METOCLOPRAMIDE HYDROCHLORIDE 5 MG/ML
10 INJECTION INTRAMUSCULAR; INTRAVENOUS EVERY 6 HOURS PRN
Status: DISCONTINUED | OUTPATIENT
Start: 2022-02-01 | End: 2022-02-04 | Stop reason: HOSPADM

## 2022-02-01 RX ORDER — FENTANYL CITRATE 50 UG/ML
50-100 INJECTION, SOLUTION INTRAMUSCULAR; INTRAVENOUS
Status: DISCONTINUED | OUTPATIENT
Start: 2022-02-01 | End: 2022-02-04 | Stop reason: HOSPADM

## 2022-02-01 RX ADMIN — MORPHINE SULFATE 10 MG: 10 INJECTION INTRAVENOUS at 22:11

## 2022-02-01 RX ADMIN — MISOPROSTOL 25 MCG: 100 TABLET ORAL at 17:50

## 2022-02-01 RX ADMIN — MISOPROSTOL 25 MCG: 100 TABLET ORAL at 22:10

## 2022-02-01 RX ADMIN — HYDROXYZINE HYDROCHLORIDE 100 MG: 50 TABLET ORAL at 22:08

## 2022-02-01 NOTE — PROGRESS NOTES
"38w3d   Alecia is doing well today, is starting to feel more uncomfortable. More GERD. Baby has been moving well. Has been seeing swelling in her hands and feet. Rubbed her eyes this morning and saw \"floaties\" in her vision but no other visual changes. Denies vaginal bleeding, discharge that's itchy or irritating, severe headaches, leakage of fluids, blurry or double vision, RUQ pain.     Physical Exam  Constitutional:       Appearance: Normal appearance.   Pulmonary:      Effort: Pulmonary effort is normal.   Musculoskeletal:      Right lower leg: Edema present.      Left lower leg: Edema present.      Comments: 2+ edema noted in LE. Mild edema in UE.    Neurological:      General: No focal deficit present.      Mental Status: She is alert and oriented to person, place, and time.   Vitals reviewed.       A/P:   (O09.93) High-risk pregnancy, third trimester  (primary encounter diagnosis)    (O13.3) Gestational hypertension, third trimester    (Z3A.38) 38 weeks gestation of pregnancy    Alecia had two elevated BP and now meets the criteria for gestational hypertension. Plan to present to L&D for induction this afternoon. Preeclampsia labs can be drawn then. She is agreeable to this plan and has everything ready at home. Will follow up with [facility] for growth scan this afternoon prior to presenting to L&D.     MIKY Wolff     I was present with the GIOVANNI student who participated in the service and in the documentation of the services provided. I have verified the history and personally performed the physical exam and medical decision making, as documented by the student and edited by me.    Arsenio Moser CNM  February 1, 2022      "

## 2022-02-01 NOTE — PROGRESS NOTES
The patient was seen for an ultrasound in the Maternal-Fetal Medicine Center at the [facility] today.  For a detailed report of the ultrasound examination, please see the ultrasound report which can be found under the imaging tab.    Ana Linclon MD  , OB/GYN  Maternal-Fetal Medicine  [phone] (Pager)

## 2022-02-01 NOTE — H&P
Alecia GIBSON Pauser is a 27 year old female    Patient's last menstrual period was 2021., Estimated Date of Delivery: 2022 is calculated from lmp and verified with U/S     Pt is [facility] to the [facility] on 2022 at 3:27 PM     for induction of labor.  Indication Gestational hypertension.  Membranes are intact    HPI: Pt was seen at clinic this morning with her second mildly elevated blood pressure giving her a diagnosis of gestational hypertension without evidence of preeclampsia at this time.    PRENATAL COURSE  Prenatal care began at 10w3d wks gestation for a total of 12 prenatal visits.  Total wt gain 258 lbs - 178 lbs = 90 lbs wt gain  Prenatal course was   complicated by    Patient Active Problem List    Diagnosis Date Noted     Encounter for induction of labor 2022     Priority: Medium     Supervision of normal first pregnancy, antepartum 2021     Priority: Medium     FOB: Erasto  Needs pap, wants to delay until later in pregnancy  Seeing [facility], Adderal in pregnancy.   [facility] U/S, all anatomy not well seen, follow up ordered at [facility]  NIPT WNL, AFP WNL       Need for Tdap vaccination 2021     Priority: Medium     Anxiety 2018     Priority: Medium     H/O: depression 2017     Priority: Medium     Attention deficit hyperactivity disorder 2017     Priority: Medium     Takes Adderall 25 mg daily       Exercise-induced asthma 2012     Priority: Medium       HISTORIES  Allergies   Allergen Reactions     Seasonal Allergies      Past Medical History:   Diagnosis Date     Anemia 2012     Depression, major, single episode, moderate (H) 2012     Uncomplicated asthma      Varicella      Past Surgical History:   Procedure Laterality Date     DENTAL SURGERY      wisdom teeth     Family History   Problem Relation Age of Onset     Allergies Sister      Attention Deficit Disorder Nephew      Arthritis Paternal Aunt      Arthritis Maternal Grandmother      Diabetes  Maternal Grandmother      Heart Disease Maternal Grandmother      Arthritis Maternal Grandfather      Cancer Maternal Grandfather      Diabetes Maternal Grandfather      Arthritis Paternal Grandmother      Arthritis Paternal Grandfather      Prostate Cancer Paternal Grandfather      Social History     Tobacco Use     Smoking status: Former Smoker     Packs/day: 0.50     Types: Cigarettes     Start date: 2012     Quit date: 2021     Years since quittin.5     Smokeless tobacco: Never Used   Substance Use Topics     Alcohol use: Not Currently     Alcohol/week: 0.8 standard drinks     Types: 1 Standard drinks or equivalent per week     OB History    Para Term  AB Living   1 0 0 0 0 0   SAB IAB Ectopic Multiple Live Births   0 0 0 0 0      # Outcome Date GA Lbr Silver/2nd Weight Sex Delivery Anes PTL Lv   1 Current                LABS:    Blood type B positive   Rhogam not needed  Rubella immune   Treponema Pallidum Antibody  Negative    HIV    Non-reactive   Lab Results   Component Value Date    HGB 10.8 2022    HGB 12.6 2012      Lab Results   Component Value Date    HEPBANG Nonreactive 2021     GBS negative     ROS  Pt denies significant constitutional symptoms including fever and/or malaise.  Pt denies significant respiratory, cardiovacular, GI, or muscular/skeletal complaints.      PHYSICAL EXAM:  LMP 2021   General appearance healthy, alert, active and no distress   Neuro:  denies headache and visual disturbances  Psych: Mentation normal and bright   Legs: 2+/2+, no clonus, no edema       Abdomen: gravid, single vertex fetus, non-tender Pt is mis in an irregular pattern    Pt had a growth U/S just prior to this admission, cephalic position, EFW 3773g (8lbs 5 oz) AC @ > 99%    FETAL HEART TONES: baseline 130 with moderate FHRV variability and accelerations.  No decelerations present.     PELVIC EXAM: 0.5 - 1 cm/ 60%/ -2  BLOODY SHOW:: no  Membranes as listed  above    ASSESSMENT:  IUP @ 38w3d  wks gestation  for induction of labor.  Indication for gestational hypertension diagnosed at term  BPs mildly elevated but stable  Non ripe cervix  NST  reactive   Parity: Primip  GBS negative and membranes intact  90 lb wt gain with pregnancy   Hx of adderall use just prior and rarely during 1st trimester for ADD        PLAN:  Admission to [facility], preeclampsia labs  Cervical ripening with serial misoprostol,   After ripening would anticipate active labor and         Kelsey Farfan CNM

## 2022-02-02 LAB — T PALLIDUM AB SER QL: NONREACTIVE

## 2022-02-02 PROCEDURE — 99232 SBSQ HOSP IP/OBS MODERATE 35: CPT | Mod: 25 | Performed by: ADVANCED PRACTICE MIDWIFE

## 2022-02-02 PROCEDURE — 120N000002 HC R&B MED SURG/OB UMMC

## 2022-02-02 PROCEDURE — 250N000009 HC RX 250: Performed by: ADVANCED PRACTICE MIDWIFE

## 2022-02-02 PROCEDURE — 3E033VJ INTRODUCTION OF OTHER HORMONE INTO PERIPHERAL VEIN, PERCUTANEOUS APPROACH: ICD-10-PCS | Performed by: ADVANCED PRACTICE MIDWIFE

## 2022-02-02 PROCEDURE — 59200 INSERT CERVICAL DILATOR: CPT | Performed by: ADVANCED PRACTICE MIDWIFE

## 2022-02-02 PROCEDURE — 258N000003 HC RX IP 258 OP 636: Performed by: ADVANCED PRACTICE MIDWIFE

## 2022-02-02 PROCEDURE — 250N000013 HC RX MED GY IP 250 OP 250 PS 637: Performed by: ADVANCED PRACTICE MIDWIFE

## 2022-02-02 RX ORDER — LIDOCAINE 40 MG/G
CREAM TOPICAL
Status: DISCONTINUED | OUTPATIENT
Start: 2022-02-02 | End: 2022-02-04 | Stop reason: HOSPADM

## 2022-02-02 RX ORDER — OXYTOCIN/0.9 % SODIUM CHLORIDE 30/500 ML
1-24 PLASTIC BAG, INJECTION (ML) INTRAVENOUS CONTINUOUS
Status: DISCONTINUED | OUTPATIENT
Start: 2022-02-02 | End: 2022-02-04 | Stop reason: HOSPADM

## 2022-02-02 RX ORDER — SODIUM CHLORIDE, SODIUM LACTATE, POTASSIUM CHLORIDE, CALCIUM CHLORIDE 600; 310; 30; 20 MG/100ML; MG/100ML; MG/100ML; MG/100ML
INJECTION, SOLUTION INTRAVENOUS CONTINUOUS PRN
Status: DISCONTINUED | OUTPATIENT
Start: 2022-02-02 | End: 2022-02-04 | Stop reason: HOSPADM

## 2022-02-02 RX ADMIN — Medication 2 MILLI-UNITS/MIN: at 20:02

## 2022-02-02 RX ADMIN — MISOPROSTOL 25 MCG: 100 TABLET ORAL at 02:10

## 2022-02-02 RX ADMIN — HYDROXYZINE HYDROCHLORIDE 100 MG: 50 TABLET ORAL at 20:14

## 2022-02-02 RX ADMIN — MISOPROSTOL 25 MCG: 100 TABLET ORAL at 10:30

## 2022-02-02 RX ADMIN — SODIUM CHLORIDE, POTASSIUM CHLORIDE, SODIUM LACTATE AND CALCIUM CHLORIDE: 600; 310; 30; 20 INJECTION, SOLUTION INTRAVENOUS at 20:05

## 2022-02-02 RX ADMIN — MISOPROSTOL 25 MCG: 100 TABLET ORAL at 15:15

## 2022-02-02 NOTE — PROGRESS NOTES
Subjective :   Pt sleeping    Objective:   /75   Pulse 97   Temp 98.3  F (36.8  C) (Oral)   Resp 18   LMP 2021   SVE: deferred  FHTs 120 moderate variability, + accels, no decels   Cxt q none,   Membranes intact    Labor Course:  22 Admission SVE @ 1650 0.5cm/60%/ -2 station  Miso @ 1750, 2210, next miso due @ 0210    Assessment:   IUP @ 38w4d  Cat 1 FHT tracing   Membranes  intact,   GBS negative  IOL for GHTN mildly elevated BPs, stable    Plan:  Continue to labor  Continue to observe and offer comfort  Continue cervical ripening  Anticipate        Kelsey Farfan, SHAWN LAMM

## 2022-02-02 NOTE — PLAN OF CARE
Data: Patient presented to [facility] at 1508.   Reason for maternal/fetal assessment per patient is Induction Of Labor  .  Patient is a . Prenatal record reviewed.      OB History    Para Term  AB Living   1 0 0 0 0 0   SAB IAB Ectopic Multiple Live Births   0 0 0 0 0      # Outcome Date GA Lbr Silver/2nd Weight Sex Delivery Anes PTL Lv   1 Current            . Medical history:   Past Medical History:   Diagnosis Date     Anemia 2012     Depression, major, single episode, moderate (H) 2012     Uncomplicated asthma      Varicella    . Gestational Age 38w3d. VSS. Fetal movement present. Patient denies cramping, backache, vaginal discharge, pelvic pressure, UTI symptoms, GI problems, bloody show, vaginal bleeding, edema, headache, visual disturbances, epigastric or URQ pain, abdominal pain, rupture of membranes. Support persons Fede present.  Action: Verbal consent for EFM. EFM applied upon arrival. Uterine assessment showed no contractions. Fetal assessment: Presumed adequate fetal oxygenation documented (see flow record).   Response: Tammie LAMM informed of pt's arrival for planned induction of labor for GHTN. Plan per provider is to start induction using misoprostol. Patient verbalized agreement with plan. Patient transferred to room 477 ambulatory, oriented to room and call light.

## 2022-02-02 NOTE — PROVIDER NOTIFICATION
GIOVANNI Garcia in department, updated pt status. Pt is sleeping comfortably. Continue with care plan. Radha will return at 230.

## 2022-02-02 NOTE — PROGRESS NOTES
S:Pt remains mostly comfortable. Was able to nap for awhile this afternoon. Feels like she is mis less than she was this morning.     O:  Blood pressure 139/89, pulse 108, temperature 98.3  F (36.8  C), temperature source Oral, resp. rate 18, last menstrual period 05/08/2021, not currently breastfeeding.  General appearance: comfortable    CONTRACTIONS: Contractions every 4-9 minutes.  Palpate: mild  FETAL HEART TONES: baseline 125 with moderate FHR variability and    accelerations yes. Decelerations no.    NST: REACTIVE  ROM: not ruptured  PELVIC EXAM:deferred  Bloody show: No    Labor course:   2/1/22: Admit @ 1650 for IOL for GHTN. Cervix 0.5cm/60/-2  1750 - Vaginal miso #1  2210  - Vaginal miso #2  2/2/22  0210 - vaginal miso #3  0600 - Miso held due to frequent contractions  1015 - Unable to place hassan bulb; decision to proceed with vaginal miso since contractions have spaced out. Miso #4 placed at 1038  1535 - Vaginal miso #5    ASSESSMENT:  ==============  IUP @ 38w4d   IOL for GHTN, mildly elevated BPs, labs WNL  Anemia - admit hgb 10.4    Fetal Heart rate tracing category one  GBS- negative  COVID negative    PLAN:  ===========  Comfort measures prn   Cervical ripening with misoprostol  Reevaluate in 4 hours/PRN   Johanna Garcia, APRN, CNM

## 2022-02-02 NOTE — PROVIDER NOTIFICATION
CNM updated that patient is still sleeping. Bolton and US adjusted.  Will have patient void at 1400 and turn to semi fowlers and adjust toco in preperation for next plan of care

## 2022-02-02 NOTE — PLAN OF CARE
Pt here for IOL for GHTN. VSS, occasional elevated BPs, none severe range, see flow sheets. Denies headache, vision changes, RUQ pain. Denies bleeding/leaking of fluid. On continuous monitoring. See Flow sheet. Denied pain throughout shift, requested vistaril and morphine at bedtime to help with sleep and cramping. Vaginal miso for cervical ripening, last at 2200. Continue POC.

## 2022-02-02 NOTE — PROGRESS NOTES
Subjective :   Pt dozing     Objective:   /75   Pulse 97   Temp 98.3  F (36.8  C) (Oral)   Resp 18   LMP 2021   SVE: deferred  FHTs 120 moderate variability, + accels, no decels noted  Cxt q none, lasting   Membranes intact    Labor Course:  22 Admission SVE @ 1650 0.5cm/60%/ -2 station  Miso @ 1750, 2210    Assessment:   IUP @ 38w3d  Cat 1 FHT tracing   Membranes  intact,   GBS negative  IOL got GHTN, mildly elevated BPs, stable    Plan:  Continue to labor  Continue to observe and offer comfort  Continue cervical ripening  Anticipate        Kelsey Farfan, SHAWN CNM

## 2022-02-02 NOTE — PROGRESS NOTES
S:Patient sitting in bed, partner supportive at bedside. Describes contractions as cramping that she can feel through her whole uterus and in her back. Has been feeling consistently since around 0500.     O:  Blood pressure (!) 144/73, pulse 97, temperature 98.3  F (36.8  C), temperature source Oral, resp. rate 16, last menstrual period 05/08/2021, not currently breastfeeding.  General appearance: comfortable    CONTRACTIONS: Contractions every 1.5-8 minutes.  Palpate: mild  FETAL HEART TONES: baseline 125 with moderate FHR variability and    accelerations yes. Decelerations no.    NST: REACTIVE  ROM: not ruptured  PELVIC EXAM:1/70/-1/soft/anterior  Fetal Position:  vertex  Bloody show: No  Pitocin- none,  Antibiotics- none    Labor course:   2/1/22: Admit @ 1650 for IOL for GHTN. Cervix 0.5cm/60/-2  1750 - Vaginal miso #1  2210  - Vaginal miso #2  2/2/22  0210 - vaginal miso #3  0600 - Miso held due to frequent contractions    ASSESSMENT:  ==============  IUP @ 38w4d   IOL for GHTN, mildly elevated BPs, labs WNL  Anemia - admit hgb 10.4    Fetal Heart rate tracing category one  GBS- negative  COVID negative     PLAN:  ===========  Comfort measures prn - patient verbalizes plan to use epidural for pain management later in labor  Cervical ripening with cervical hassan bulb and pitocin - discussed placement, risks/benefits  Pain medication per patient request  Plan to eat breakfast and shower then will move forward with hassan bulb placement  Johanna Garcia, SHAWN, CNM

## 2022-02-02 NOTE — PROGRESS NOTES
Subjective :   Feeling well, states she has maybe felt one contraction     Objective:   BP (!) 140/86   Pulse 107   Temp 98.3  F (36.8  C) (Oral)   Resp 18   LMP 2021   SVE: deferred  FHTs 120 moderate variability, multiple + accels  Cxt q 6-10 mins , lasting 120 secs  Membranes intact    Labs @ 1734  Creatinine 0.69  ALT 19  AST 26  Plts 202  hgb 10.4    Labor Course:  22 Admission SVE @ 1650 0.5cm/60%/ -2 station  Miso @ 1750, second dose due     Assessment:   IUP @ 38w3d  Cat 1 FHT tracing   Membranes  intact,   GBS negative  BPs mildly elevated but stable  HELLP labs WNL    Plan:  Continue to labor  Continue to observe and offer comfort  Sleep meds prn  Anticipate        Kelsey Farfan, APRN CNM

## 2022-02-02 NOTE — PROGRESS NOTES
S:Pt ate breakfast and showered. Ready for hassan bulb placement.     O:  Blood pressure 138/81, pulse 108, temperature 98  F (36.7  C), temperature source Oral, resp. rate 18, last menstrual period 05/08/2021, not currently breastfeeding.  General appearance: comfortable    CONTRACTIONS: Contractions every 1.5-10 minutes.  Palpate: mild  FETAL HEART TONES: baseline 125 with moderate FHR variability and    accelerations yes. Decelerations no.    NST: REACTIVE  ROM: not ruptured  PELVIC EXAM:1/70/-1/posterior  Fetal Position:  vertex  Bloody show: No  Atkins score - 7    Procedure: positioned patient in supine with feet on foot pedals. Placed speculum and visualized cervix but cervical os pointing directly posterior and unable to reposition speculum to change cervical position. Removed speculum and attempted to place hassan bulb without but cervix too posterior to place at this time.     Labor course:   2/1/22: Admit @ 1650 for IOL for GHTN. Cervix 0.5cm/60/-2  1750 - Vaginal miso #1  2210  - Vaginal miso #2  2/2/22  0210 - vaginal miso #3  0600 - Miso held due to frequent contractions  1015 - Unable to place hassan bulb; decision to proceed with vaginal miso since contractions have spaced out.    ASSESSMENT:  ==============  IUP @ 38w4d   IOL for GHTN, mildly elevated BPs, labs WNL  Anemia - admit hgb 10.4    Fetal Heart rate tracing category one  GBS- negative  COVID negative    PLAN:  ===========  Comfort measures prn   Cervical ripening with vaginal misoprostol  Reevaluate in 4 hours/PRN   Johanna Garcia, APRN, CNM

## 2022-02-02 NOTE — PROGRESS NOTES
Subjective :   Pt states she slept for about 4 hrs, then has been awake with cramping and is feeling her contractions now    Objective:   BP (!) 144/73   Pulse 97   Temp 98.3  F (36.8  C) (Oral)   Resp 16   LMP 2021   SVE: deferred  FHTs 120 moderate variability, + accels, no decels  Cxt q q 2-4 , lasting 60-90  Membranes intact    Labor Course:  22 Admission SVE @ 1650 0.5cm/60%/ -2 station  Miso @ 1750, 2210, 0210, (6 am dose held for contraction eval)     Assessment:   IUP @ 38w4d  Cat 1 FHT tracing   Membranes  intact,   GBS negative  IOL for GHTN mildly elevated BPs, stable      Plan:  Continue to labor  Continue to observe and offer comfort  Anticipate        SHAWN PaintingM

## 2022-02-02 NOTE — PLAN OF CARE
Pt VSS. BP WNL. Miso dose #3 given at 0200. Her ctx are 2-4 mins apart. Pt feels more cramping now and is coping well. No headaches reported, denies visual changes and epigastric pain. Able to rest for a few hours overnight. FHR has moderate variability. Continue with plan of care.

## 2022-02-02 NOTE — PLAN OF CARE
Induction process continues.  Pt has minimal contractions. 5th miso given vaginally. FHR Cat I.  Pat. Denies questions or concerns at this time.

## 2022-02-02 NOTE — PROGRESS NOTES
Subjective :   Eating dinner, denies contracitons    Objective:   BP (!) 140/86   Pulse 107   Temp 98.3  F (36.8  C) (Oral)   Resp 18   LMP 2021   SVE: deferred  FHTs 120 moderate variability, + accels, no decels   Cxt q none  Membranes intact    Labor Course:  22 Admission SVE @ 1650 0.5cm/60%/ -2 station  Miso @ 1750     Assessment:   IUP @ 38w3d  Cat 1 FHT tracing   Membranes  intact,   GBS negative  Cervical ripening    Plan:   Continue to labor  Continue to observe and offer comfort  Anticipate        Kelsey Farfan, SHAWN CNM

## 2022-02-03 ENCOUNTER — ANESTHESIA EVENT (OUTPATIENT)
Dept: OBGYN | Facility: CLINIC | Age: 27
End: 2022-02-03
Payer: COMMERCIAL

## 2022-02-03 ENCOUNTER — ANESTHESIA (OUTPATIENT)
Dept: OBGYN | Facility: CLINIC | Age: 27
End: 2022-02-03
Payer: COMMERCIAL

## 2022-02-03 LAB
ALT SERPL W P-5'-P-CCNC: 18 U/L (ref 0–50)
AST SERPL W P-5'-P-CCNC: 21 U/L (ref 0–45)
CREAT SERPL-MCNC: 0.59 MG/DL (ref 0.52–1.04)
CREAT UR-MCNC: 103 MG/DL
ERYTHROCYTE [DISTWIDTH] IN BLOOD BY AUTOMATED COUNT: 15.3 % (ref 10–15)
GFR SERPL CREATININE-BSD FRML MDRD: >90 ML/MIN/1.73M2
HCT VFR BLD AUTO: 35.1 % (ref 35–47)
HGB BLD-MCNC: 11 G/DL (ref 11.7–15.7)
MCH RBC QN AUTO: 26.1 PG (ref 26.5–33)
MCHC RBC AUTO-ENTMCNC: 31.3 G/DL (ref 31.5–36.5)
MCV RBC AUTO: 83 FL (ref 78–100)
PLATELET # BLD AUTO: 187 10E3/UL (ref 150–450)
PROT UR-MCNC: 2.13 G/L
PROT/CREAT 24H UR: 2.07 G/G CR (ref 0–0.2)
RBC # BLD AUTO: 4.21 10E6/UL (ref 3.8–5.2)
WBC # BLD AUTO: 10.2 10E3/UL (ref 4–11)

## 2022-02-03 PROCEDURE — 250N000011 HC RX IP 250 OP 636: Performed by: ADVANCED PRACTICE MIDWIFE

## 2022-02-03 PROCEDURE — 82565 ASSAY OF CREATININE: CPT | Performed by: ADVANCED PRACTICE MIDWIFE

## 2022-02-03 PROCEDURE — 258N000003 HC RX IP 258 OP 636: Performed by: ADVANCED PRACTICE MIDWIFE

## 2022-02-03 PROCEDURE — 36415 COLL VENOUS BLD VENIPUNCTURE: CPT | Performed by: ADVANCED PRACTICE MIDWIFE

## 2022-02-03 PROCEDURE — 250N000009 HC RX 250: Performed by: ADVANCED PRACTICE MIDWIFE

## 2022-02-03 PROCEDURE — 00HU33Z INSERTION OF INFUSION DEVICE INTO SPINAL CANAL, PERCUTANEOUS APPROACH: ICD-10-PCS | Performed by: STUDENT IN AN ORGANIZED HEALTH CARE EDUCATION/TRAINING PROGRAM

## 2022-02-03 PROCEDURE — 85014 HEMATOCRIT: CPT | Performed by: ADVANCED PRACTICE MIDWIFE

## 2022-02-03 PROCEDURE — 250N000011 HC RX IP 250 OP 636: Performed by: STUDENT IN AN ORGANIZED HEALTH CARE EDUCATION/TRAINING PROGRAM

## 2022-02-03 PROCEDURE — 250N000009 HC RX 250

## 2022-02-03 PROCEDURE — 250N000011 HC RX IP 250 OP 636

## 2022-02-03 PROCEDURE — 84460 ALANINE AMINO (ALT) (SGPT): CPT | Performed by: ADVANCED PRACTICE MIDWIFE

## 2022-02-03 PROCEDURE — 120N000002 HC R&B MED SURG/OB UMMC

## 2022-02-03 PROCEDURE — 84450 TRANSFERASE (AST) (SGOT): CPT | Performed by: ADVANCED PRACTICE MIDWIFE

## 2022-02-03 PROCEDURE — 3E0R3BZ INTRODUCTION OF ANESTHETIC AGENT INTO SPINAL CANAL, PERCUTANEOUS APPROACH: ICD-10-PCS | Performed by: STUDENT IN AN ORGANIZED HEALTH CARE EDUCATION/TRAINING PROGRAM

## 2022-02-03 PROCEDURE — 84156 ASSAY OF PROTEIN URINE: CPT | Performed by: ADVANCED PRACTICE MIDWIFE

## 2022-02-03 PROCEDURE — 250N000013 HC RX MED GY IP 250 OP 250 PS 637: Performed by: ADVANCED PRACTICE MIDWIFE

## 2022-02-03 PROCEDURE — 10907ZC DRAINAGE OF AMNIOTIC FLUID, THERAPEUTIC FROM PRODUCTS OF CONCEPTION, VIA NATURAL OR ARTIFICIAL OPENING: ICD-10-PCS | Performed by: ADVANCED PRACTICE MIDWIFE

## 2022-02-03 PROCEDURE — 59200 INSERT CERVICAL DILATOR: CPT | Performed by: ADVANCED PRACTICE MIDWIFE

## 2022-02-03 RX ORDER — LORAZEPAM 2 MG/ML
2 INJECTION INTRAMUSCULAR
Status: DISCONTINUED | OUTPATIENT
Start: 2022-02-03 | End: 2022-02-07 | Stop reason: HOSPADM

## 2022-02-03 RX ORDER — OXYTOCIN/0.9 % SODIUM CHLORIDE 30/500 ML
PLASTIC BAG, INJECTION (ML) INTRAVENOUS
Status: DISCONTINUED
Start: 2022-02-03 | End: 2022-02-04 | Stop reason: WASHOUT

## 2022-02-03 RX ORDER — SODIUM CHLORIDE, SODIUM LACTATE, POTASSIUM CHLORIDE, CALCIUM CHLORIDE 600; 310; 30; 20 MG/100ML; MG/100ML; MG/100ML; MG/100ML
10-125 INJECTION, SOLUTION INTRAVENOUS CONTINUOUS
Status: DISCONTINUED | OUTPATIENT
Start: 2022-02-03 | End: 2022-02-07 | Stop reason: HOSPADM

## 2022-02-03 RX ORDER — ACETAMINOPHEN 325 MG/1
975 TABLET ORAL EVERY 6 HOURS PRN
Status: DISCONTINUED | OUTPATIENT
Start: 2022-02-03 | End: 2022-02-07 | Stop reason: HOSPADM

## 2022-02-03 RX ORDER — MAGNESIUM SULFATE IN WATER 40 MG/ML
2 INJECTION, SOLUTION INTRAVENOUS CONTINUOUS
Status: DISPENSED | OUTPATIENT
Start: 2022-02-03 | End: 2022-02-05

## 2022-02-03 RX ORDER — FENTANYL CITRATE 50 UG/ML
INJECTION, SOLUTION INTRAMUSCULAR; INTRAVENOUS
Status: COMPLETED | OUTPATIENT
Start: 2022-02-03 | End: 2022-02-03

## 2022-02-03 RX ORDER — MAGNESIUM SULFATE HEPTAHYDRATE 40 MG/ML
2 INJECTION, SOLUTION INTRAVENOUS
Status: DISCONTINUED | OUTPATIENT
Start: 2022-02-03 | End: 2022-02-07 | Stop reason: HOSPADM

## 2022-02-03 RX ORDER — HYDRALAZINE HYDROCHLORIDE 20 MG/ML
10 INJECTION INTRAMUSCULAR; INTRAVENOUS
Status: DISCONTINUED | OUTPATIENT
Start: 2022-02-03 | End: 2022-02-07 | Stop reason: HOSPADM

## 2022-02-03 RX ORDER — MAGNESIUM SULFATE IN WATER 40 MG/ML
INJECTION, SOLUTION INTRAVENOUS
Status: COMPLETED
Start: 2022-02-03 | End: 2022-02-03

## 2022-02-03 RX ORDER — MAGNESIUM SULFATE HEPTAHYDRATE 40 MG/ML
INJECTION, SOLUTION INTRAVENOUS
Status: COMPLETED
Start: 2022-02-03 | End: 2022-02-03

## 2022-02-03 RX ORDER — MAGNESIUM SULFATE HEPTAHYDRATE 500 MG/ML
10 INJECTION, SOLUTION INTRAMUSCULAR; INTRAVENOUS
Status: DISCONTINUED | OUTPATIENT
Start: 2022-02-03 | End: 2022-02-07 | Stop reason: HOSPADM

## 2022-02-03 RX ORDER — NALBUPHINE HYDROCHLORIDE 10 MG/ML
2.5-5 INJECTION, SOLUTION INTRAMUSCULAR; INTRAVENOUS; SUBCUTANEOUS EVERY 6 HOURS PRN
Status: DISCONTINUED | OUTPATIENT
Start: 2022-02-03 | End: 2022-02-07 | Stop reason: HOSPADM

## 2022-02-03 RX ORDER — CALCIUM GLUCONATE 94 MG/ML
1 INJECTION, SOLUTION INTRAVENOUS
Status: DISCONTINUED | OUTPATIENT
Start: 2022-02-03 | End: 2022-02-07 | Stop reason: HOSPADM

## 2022-02-03 RX ORDER — MISOPROSTOL 200 UG/1
TABLET ORAL
Status: DISCONTINUED
Start: 2022-02-03 | End: 2022-02-04 | Stop reason: HOSPADM

## 2022-02-03 RX ORDER — LABETALOL HYDROCHLORIDE 5 MG/ML
INJECTION, SOLUTION INTRAVENOUS
Status: COMPLETED
Start: 2022-02-03 | End: 2022-02-03

## 2022-02-03 RX ORDER — MAGNESIUM SULFATE HEPTAHYDRATE 40 MG/ML
4 INJECTION, SOLUTION INTRAVENOUS
Status: DISCONTINUED | OUTPATIENT
Start: 2022-02-03 | End: 2022-02-07 | Stop reason: HOSPADM

## 2022-02-03 RX ORDER — LIDOCAINE HYDROCHLORIDE 10 MG/ML
INJECTION, SOLUTION EPIDURAL; INFILTRATION; INTRACAUDAL; PERINEURAL
Status: DISCONTINUED
Start: 2022-02-03 | End: 2022-02-04 | Stop reason: WASHOUT

## 2022-02-03 RX ORDER — FENTANYL CITRATE-0.9 % NACL/PF 10 MCG/ML
100 PLASTIC BAG, INJECTION (ML) INTRAVENOUS EVERY 5 MIN PRN
Status: DISCONTINUED | OUTPATIENT
Start: 2022-02-03 | End: 2022-02-04 | Stop reason: HOSPADM

## 2022-02-03 RX ORDER — OXYTOCIN 10 [USP'U]/ML
INJECTION, SOLUTION INTRAMUSCULAR; INTRAVENOUS
Status: DISCONTINUED
Start: 2022-02-03 | End: 2022-02-04 | Stop reason: HOSPADM

## 2022-02-03 RX ORDER — FENTANYL/ROPIVACAINE/NS/PF 2MCG/ML-.1
PLASTIC BAG, INJECTION (ML) EPIDURAL
Status: DISCONTINUED | OUTPATIENT
Start: 2022-02-03 | End: 2022-02-04 | Stop reason: HOSPADM

## 2022-02-03 RX ORDER — LABETALOL HYDROCHLORIDE 5 MG/ML
20-80 INJECTION, SOLUTION INTRAVENOUS EVERY 10 MIN PRN
Status: DISCONTINUED | OUTPATIENT
Start: 2022-02-03 | End: 2022-02-07 | Stop reason: HOSPADM

## 2022-02-03 RX ORDER — MAGNESIUM SULFATE HEPTAHYDRATE 40 MG/ML
4 INJECTION, SOLUTION INTRAVENOUS ONCE
Status: COMPLETED | OUTPATIENT
Start: 2022-02-03 | End: 2022-02-03

## 2022-02-03 RX ADMIN — LABETALOL HYDROCHLORIDE 20 MG: 5 INJECTION, SOLUTION INTRAVENOUS at 10:52

## 2022-02-03 RX ADMIN — SODIUM CHLORIDE, POTASSIUM CHLORIDE, SODIUM LACTATE AND CALCIUM CHLORIDE: 600; 310; 30; 20 INJECTION, SOLUTION INTRAVENOUS at 03:41

## 2022-02-03 RX ADMIN — FENTANYL CITRATE 100 MCG: 50 INJECTION, SOLUTION INTRAMUSCULAR; INTRAVENOUS at 13:50

## 2022-02-03 RX ADMIN — FENTANYL CITRATE 100 MCG: 50 INJECTION, SOLUTION INTRAMUSCULAR; INTRAVENOUS at 09:27

## 2022-02-03 RX ADMIN — MAGNESIUM SULFATE HEPTAHYDRATE 2 G/HR: 40 INJECTION, SOLUTION INTRAVENOUS at 11:48

## 2022-02-03 RX ADMIN — Medication 10 ML: at 13:50

## 2022-02-03 RX ADMIN — ACETAMINOPHEN 975 MG: 325 TABLET, FILM COATED ORAL at 18:39

## 2022-02-03 RX ADMIN — Medication: at 13:45

## 2022-02-03 RX ADMIN — MAGNESIUM SULFATE HEPTAHYDRATE 4 G: 40 INJECTION, SOLUTION INTRAVENOUS at 11:14

## 2022-02-03 RX ADMIN — SODIUM CHLORIDE, POTASSIUM CHLORIDE, SODIUM LACTATE AND CALCIUM CHLORIDE 500 ML: 600; 310; 30; 20 INJECTION, SOLUTION INTRAVENOUS at 12:33

## 2022-02-03 RX ADMIN — Medication 2 MILLI-UNITS/MIN: at 09:47

## 2022-02-03 RX ADMIN — LABETALOL HYDROCHLORIDE 20 MG: 5 INJECTION, SOLUTION INTRAVENOUS at 12:24

## 2022-02-03 RX ADMIN — NALBUPHINE HYDROCHLORIDE 2.5 MG: 10 INJECTION, SOLUTION INTRAMUSCULAR; INTRAVENOUS; SUBCUTANEOUS at 22:26

## 2022-02-03 RX ADMIN — SODIUM CHLORIDE, POTASSIUM CHLORIDE, SODIUM LACTATE AND CALCIUM CHLORIDE: 600; 310; 30; 20 INJECTION, SOLUTION INTRAVENOUS at 20:12

## 2022-02-03 NOTE — PROGRESS NOTES
S:Check in with bedside RN, as CNM in meeting this morning. RN states that patient is comfortable. Has been leaking scant pink fluid, unsure if ROM or not.     O:  Blood pressure (!) 141/86, pulse 108, temperature 97.7  F (36.5  C), temperature source Oral, resp. rate 20, last menstrual period 2021, not currently breastfeeding.  General appearance: comfortable    CONTACTIONS: Contractions every 2.5-5 minutes.  Palpate: mild  FETAL HEART TONES: baseline 125 with moderate FHR variability and    accelerations yes. Decelerations no.    NST: REACTIVE  ROM: unsure  PELVIC EXAM: deferred    Bloody show: No  Pitocin- 14 mu/min.,  Antibiotics- none  Cervical ripening: N/A    ASSESSMENT:  ==============  IUP @ 38w5d early labor   Fetal Heart rate tracing category one  GBS- negative  Gestational hypertension     PLAN:  ===========  comfort measures prn   Pain medication per patient request  CBC ordered to get current platelet in anticipation of epidural today  Continue to titrate pitocin per protocol. Will consider pitocin break this morning.  Anticipate    Re-evaluate in 1-2 hours    Arsenio Moser CNM

## 2022-02-03 NOTE — PROGRESS NOTES
S:Alecia is comfortable, was able to nap. Continues to deny s/s of preeclampsia.     O:  Blood pressure 126/67, pulse 108, temperature 97.9  F (36.6  C), temperature source Oral, resp. rate 20, last menstrual period 2021, SpO2 96 %, not currently breastfeeding.  General appearance: comfortable    CONTACTIONS: Contractions every 5-6 minutes.  Palpate: mild  FETAL HEART TONES: baseline 130 with moderate FHR variability and    accelerations yes. Decelerations no.    NST: REACTIVE  ROM: not ruptured  PELVIC EXAM: deferred  Fetal Position:  cephalic  Bloody show: Yes   Pitocin- 14 mu/min.,  Antibiotics- none  Cervical ripening: N/A    ASSESSMENT:  ==============  IUP @ 38w5d active labor   Fetal Heart rate tracing category one  GBS- negative  Preeclampsia with SF on Magnesium infusion     PLAN:  ===========  comfort measures prn   Pain medication continue epidural  Anticipate   Labor induction with Pitocin-titrate per protocol  reevaluate in 2-4 hours/PRN     Arsenio Moser CNM

## 2022-02-03 NOTE — PROGRESS NOTES
S:Received an electronic page from bedside RN that Alecia has had two sustained severe range BPs. Alecia is starting to get more uncomfortable, jim from pressure of the hassan bulb.     O:  Blood pressure (!) 163/101, pulse 108, temperature 97.6  F (36.4  C), temperature source Oral, resp. rate 20, last menstrual period 2021, not currently breastfeeding.  General appearance: uncomfortable     CONTACTIONS: Contractions every 2-4 minutes.  Palpate: mild  FETAL HEART TONES: baseline 120 with moderate FHR variability and    accelerations yes. Decelerations no.      ROM: not ruptured  PELVIC EXAM: deferred-hassan bulb in place  Fetal Position:  cephalic  Bloody show: Yes   Pitocin- 4 mu/min.,  Antibiotics- none  Cervical ripening: N/A    ASSESSMENT:  ==============  IUP @ 38w5d early labor  Preeclampsia with SF including sustained severe range BP  Fetal Heart rate tracing category one  GBS- negative     PLAN:  ===========  comfort measures prn   Pain medication per patient request  Anticipate   Labor induction with Pitocin and hassan bulb  reevaluate in 2-4 hours/PRN   Dr. Marino updated on patient status and that if continues to need IV BP meds after Mg infusion, will need ILA to MD service.    Arsenio Moser CNM

## 2022-02-03 NOTE — PLAN OF CARE
IOL for gHTN continued overnight. Pt resting comfortably. VSS with BP trending in the mild range. Edema noted in lower legs, feet, and hands. Pt declines other S/s of preeclampsia. Some rash noted on arms with no complaints from pt. Monitored toco and FHR throughout shift. MICHELE applied around 0600. Pt tolerating contractions with some irritability and FHR in category I. Pitocin started at 2000 and currently running at 12mU/min. LR running at 125/mL. Continue to monitor patient and adjust toco and FHR monitors as needed. Cierra Weber on 2/3/2022 at 7:03 AM

## 2022-02-03 NOTE — PROVIDER NOTIFICATION
02/03/22 1047   Provider Notification   Provider Name/Title Arsenio Moser   Method of Notification At Bedside   Request Evaluate in Person   Notification Reason Status Update     Provider rounded at bedside and assessed PT. Reflexes +3, 1 beat of clonus. Denies other symptoms. Provider ordered magnesium sulfate and labetalol. Rn administering and monitoring per plan of care.

## 2022-02-03 NOTE — PROGRESS NOTES
S:Patient resting in bed, comfortable. Partner at bedside and supportive.     O:  Blood pressure (!) 156/89, pulse 108, temperature 98.6  F (37  C), temperature source Oral, resp. rate 20, last menstrual period 05/08/2021, not currently breastfeeding.  General appearance: comfortable    CONTRACTIONS: Contractions every 3-5.5 minutes.  Palpate: mild  FETAL HEART TONES: baseline 130 with moderate FHR variability and    accelerations yes. Decelerations no.    NST: REACTIVE  ROM: not ruptured  PELVIC EXAM:deferred  Pitocin- 4 mu/min.,  Antibiotics- none    Labor course:   2/1/22: Admit @ 1650 for IOL for GHTN. Cervix 0.5cm/60/-2  1750 - Vaginal miso #1  2210  - Vaginal miso #2  2/2/22  0210 - vaginal miso #3  0600 - Miso held due to frequent contractions  1015 - Unable to place hassan bulb; decision to proceed with vaginal miso since contractions have spaced out. Miso #4 placed at 1038  1535 - Vaginal miso #5  1915 - Cervix 2/80/-1/soft/mid-position. Atkins 9.   2000 - Pitocin started    ASSESSMENT:  ==============  IUP @ 38w4d   IOL for GHTN, mildly elevated BPs, labs WNL  Anemia - admit hgb 10.4    Fetal Heart rate tracing category one  GBS- negative  COVID negative    PLAN:  ===========  Labor induction with Pitocin; continue to titrate per unit protocol   Pain medication per patient request  Reevaluate in 2-4 hours or sooner prn  Johanna Garcia, APRN, CNM

## 2022-02-03 NOTE — PROGRESS NOTES
S:Patient resting/sleeping in bed. Partner at bedside and supportive.     O:  Blood pressure (!) 144/90, pulse 108, temperature 98.1  F (36.7  C), temperature source Oral, resp. rate 20, last menstrual period 2021, not currently breastfeeding.  General appearance: comfortable    CONTRACTIONS: Contractions every 3-5 minutes.  Palpate: mild  FETAL HEART TONES: baseline 120 with moderate FHR variability and    accelerations yes. Decelerations no.    NST: REACTIVE  ROM: not ruptured  PELVIC EXAM:deferred  Pitocin- 8 mu/min.,  Antibiotics- none    Labor course:   22: Admit @ 1650 for IOL for GHTN. Cervix 0.5cm/60/-2  1750 - Vaginal miso #1  2210  - Vaginal miso #2  22  0210 - vaginal miso #3  0600 - Miso held due to frequent contractions  1015 - Unable to place hassan bulb; decision to proceed with vaginal miso since contractions have spaced out. Miso #4 placed at 1038  1535 - Vaginal miso #5  1915 - Cervix 2/80/-1/soft/mid-position. Atkins 9.   2000 - Pitocin started     ASSESSMENT:  ==============  IUP @ 38w5d   IOL for GHTN, mildly elevated BPs, labs WNL  Anemia - admit hgb 10.4    Fetal Heart rate tracing category one  GBS- negative  COVID negative    PLAN:  ===========  Comfort measures prn   Pain medication per patient request  Anticipate   Labor induction with Pitocin; titrate per unit protocol  Reevaluate in 2-4 hours/PRN   Johanna Garcia, SHAWN, CNM

## 2022-02-03 NOTE — PLAN OF CARE
/65   Pulse 108   Temp 97.6  F (36.4  C) (Oral)   Resp 20   LMP 05/08/2021   SpO2 94%   PT had several severe range BP this shift. CNM notified and came to bedside to assess PT. Reflexes +3, 1 beat of clonus, and edema present. PT denies other signs and symptoms. Magnesium sulfate administered. Labetolol given for hypertension. BP improved.    PT is having irregular contractions this shift. CNM placed Wells bulb. Pitocin infusing. Baby has had good accelerations and variability. Continuous fetal and uterine monitoring in place. Wells bulb able to be removed. PT's pain increased and requested epidural. Epidural was placed and PT reported improved pain control. Last cervical check 5-6cm/90%/-2. Monitoring bladder status.    Support person Fede present and attentive at bedside. RNs assisting PT with positioning. Continuing to monitor and support.

## 2022-02-03 NOTE — PROGRESS NOTES
S:Patient resting/sleeping, mostly comfortable.     O:  Blood pressure (!) 144/65, pulse 108, temperature 98.3  F (36.8  C), temperature source Oral, resp. rate 20, last menstrual period 2021, not currently breastfeeding.  General appearance: comfortable    CONTRACTIONS: difficult to assess when lying on side  FETAL HEART TONES: baseline 115 with moderate FHR variability and    accelerations yes. Decelerations no.    NST: REACTIVE  ROM: not ruptured  PELVIC EXAM:deferred  Pitocin- 10 mu/min.,  Antibiotics- none    Labor course:   22: Admit @ 1650 for IOL for GHTN. Cervix 0.5cm/60/-2  1750 - Vaginal miso #1  2210  - Vaginal miso #2  22  0210 - vaginal miso #3  0600 - Miso held due to frequent contractions  1015 - Unable to place hassan bulb; decision to proceed with vaginal miso since contractions have spaced out. Miso #4 placed at 1038  1535 - Vaginal miso #5  1915 - Cervix 2/80/-1/soft/mid-position. Atkins 9.   2000 - Pitocin started     ASSESSMENT:  ==============  IUP @ 38w5d   IOL for GHTN, mildly elevated BPs, labs WNL  Anemia - admit hgb 10.4    Fetal Heart rate tracing category one  GBS- negative  COVID negative    PLAN:  ===========  Comfort measures prn   Pain medication per patient request  Anticipate   Labor induction with Pitocin; titrate per unit protocol  Reevaluate in 2-4 hours/PRN   SHAWN Wisdom, GIOVANNI

## 2022-02-03 NOTE — PLAN OF CARE
No severe range BPs this shift. All other VSS. Patient denies headache, vision changes, epigastric pain. Denies LOF. Small amount of brown/red blood and mucus when up to bathroom. Active fetal movement and category 1 fetal tracing. Patient having intermittent lower abdominal and back cramping, using hot packs to help. Planning for epidural as labor progresses. Will continue toward .

## 2022-02-03 NOTE — PROGRESS NOTES
S:Patient remains mostly comfortable. Notices occasional contractions but intermittently.     O:  Blood pressure (!) 130/97, pulse 108, temperature 98.7  F (37.1  C), temperature source Oral, resp. rate 20, last menstrual period 05/08/2021, not currently breastfeeding.  General appearance: comfortable    CONTRACTIONS: irregular, mild  FETAL HEART TONES: baseline 130 with moderate FHR variability and    accelerations yes. Decelerations no.    NST: REACTIVE  ROM: not ruptured  PELVIC EXAM:2/80/-1/soft/mid position  Fetal Position:  vertex  Bloody show: Yes     Labor course:   2/1/22: Admit @ 1650 for IOL for GHTN. Cervix 0.5cm/60/-2  1750 - Vaginal miso #1  2210  - Vaginal miso #2  2/2/22  0210 - vaginal miso #3  0600 - Miso held due to frequent contractions  1015 - Unable to place hassan bulb; decision to proceed with vaginal miso since contractions have spaced out. Miso #4 placed at 1038  1535 - Vaginal miso #5  1915 - Cervix 2/80/-1/soft/mid-position. Atkins 9.     ASSESSMENT:  ==============  IUP @ 38w4d   IOL for GHTN, mildly elevated BPs, labs WNL  Anemia - admit hgb 10.4    Fetal Heart rate tracing category one  GBS- negative  COVID negative    PLAN:  ===========  Orders in for pitocin; titrate per unit protocol.  Comfort measures prn - discussed epidural for pain management when uncomfortable. Encouraged mobility and upright positioning as long as possible.   Reevaluate in 2-4 hours or sooner prn  Johanna Garcia, SHAWN, CNM

## 2022-02-03 NOTE — PROGRESS NOTES
S: Alecia is still comfortable this morning. Denies headache, vision changes, RUQ pain. Pitocin is infusing, she feels some mild cramping/tightening.     O:  Blood pressure 137/77, pulse 108, temperature 97.6  F (36.4  C), temperature source Oral, resp. rate 20, last menstrual period 2021, not currently breastfeeding.  General appearance: comfortable    CONTACTIONS: Contractions every 1-3 minutes.  Palpate: mild  FETAL HEART TONES: baseline 120 with moderate FHR variability and    accelerations yes. Decelerations no.    NST: REACTIVE  ROM: not ruptured  PELVIC EXAM: PELVIC EXAM: / Posterior/ average/ -2 no change  Fetal Position:  cephalic  Bloody show: No  Pitocin- 14 mu/min.,  Antibiotics- none  Cervical ripening: N/A    HASSAN BULB PLACEMENT BY CNM:  Patient placed in dorsal lithotomy position. 100mcg of IV fentanyl administered by RN for procedure pain control. SVE done and hassan bulb placement attempted with just digital guidance. Unable to place. Speculum placed and cervix visualized. No leaking/bleeding from os, no pooling in vault. Hassan bulb was easily passed through cervix with stilette and guided hassan catheter off with use of long ring forcep. Once bulb appeared to be inside os, inflated with 30cc and speculum removed. SVE to verify balloon was inside cervix, then inflated with another 30cc. Patient tolerated procedure well. Small bleeding from cervix with procedure. FHR category I at completion of procedure.    ASSESSMENT:  ==============  IUP @ 38w5d minimal/no progress and gestational hypertension   Fetal Heart rate tracing category one  GBS- negative     PLAN:  ===========  comfort measures prn   Pain medication: dose of IV fentanyl now for hassan bulb placement; planning epidural for labor  Anticipate   Labor induction with Pitocin-plan for pitocin break now until after hassan is place. Leave off for at least 30 min, then restart at 2 and increase per protocol.  reevaluate in 2-4  hours/PRN   Discussed adding hassan bulb for cervical ripening as no change despite contractions. Discussed risks/benefits, and Alecia agreed to hassan placement.    Arsenio Moser CNM

## 2022-02-03 NOTE — PROVIDER NOTIFICATION
02/03/22 1043   Provider Notification   Provider Name/Title Arsenio Moser   Method of Notification Electronic Page   Request Evaluate - Remote   Notification Reason Maternal Vital Sign Change     Paged Provider for 2 severe range BP.

## 2022-02-03 NOTE — PROVIDER NOTIFICATION
02/03/22 0812   Provider Notification   Provider Name/Title Arsenio Almaguer CNM   Method of Notification Phone   Request Evaluate - Remote   Notification Reason Status Update     Provider called to review care plan with RN and receive status update. Continue with current plan of care. Provider to order CBC. Rn to continue increasing Pitocin per order set with continuous monitoring.

## 2022-02-03 NOTE — PROVIDER NOTIFICATION
02/03/22 1225   Provider Notification   Provider Name/Title Arsenio Moser   severe range BP x2, plan to hive hydralazine 20mg as ordered. Continue to monitor

## 2022-02-03 NOTE — PROGRESS NOTES
S: Alecia is now comfortable with her epidural. She received a second dose of labetalol about 1.5 hours after the first, and BPs are improved. She is tolerating Mg infusion well.     O:  Blood pressure 134/74, pulse 108, temperature 97.6  F (36.4  C), temperature source Oral, resp. rate 20, last menstrual period 2021, SpO2 95 %, not currently breastfeeding.  General appearance: comfortable    CONTACTIONS: Contractions every 2-10 minutes.  Palpate: mild  FETAL HEART TONES: baseline 125 with moderate FHR variability and    accelerations yes. Decelerations no. Loss of capture during epidural placement  ROM: not ruptured  PELVIC EXAM: PELVIC EXAM: 5.5/ 90/ Posterior/ soft/ -2; hassan bulb out  Fetal Position:  cephalic  Bloody show: Yes   Pitocin- 6 mu/min.,  Antibiotics- none  Cervical ripening: N/A    ASSESSMENT:  ==============  IUP @ 38w5d active labor and good progress   Fetal Heart rate tracing category one  GBS- negative  Preeclampsia with SF-on Mg infusion     PLAN:  ===========  comfort measures prn  Pain medication-continue epidural per anesthesia orders  Anticipate   Labor induction with Pitocin-titrate per protocol  reevaluate in 2-4 hours/PRN     Arsenio Moser CNM

## 2022-02-03 NOTE — ANESTHESIA PROCEDURE NOTES
Epidural catheter Procedure Note    Pre-Procedure   Staff -        Anesthesiologist:  Dalila Wood MD       Resident/Fellow: London Charlton       Other Anesthesia Staff: Jaden Lim MD       Performed By: resident       Location: OB       Procedure Start/Stop Times: 2/3/2022 1:25 PM and 2/3/2022 1:45 PM       Pre-Anesthestic Checklist: patient identified, IV checked, risks and benefits discussed, informed consent, monitors and equipment checked, pre-op evaluation, at physician/surgeon's request and post-op pain management  Timeout:       Correct Patient: Yes        Correct Procedure: Yes        Correct Site: Yes        Correct Position: Yes   Procedure Documentation  Procedure: epidural catheter       Diagnosis: Labor analgesia       Patient Position: sitting       Patient Prep/Sterile Barriers: sterile gloves, mask       Skin prep: DuraPrep and Chloraprep       Local skin infiltrated with 3 mL of 2% lidocaine.        Insertion Site: L2-3. (midline approach).       Technique: LORT saline        NELSY at 6 cm.       Needle Type: Touhy needle       Needle Gauge: 17.        Needle Length (Inches): 3.5        Catheter: 18 G.         Catheter threaded easily.         5 cm epidural space.         Threaded 11 cm at skin.         # of attempts: 1 and  # of redirects:  0    Assessment/Narrative         Paresthesias: No.      Test dose of mL lidocaine 1.5% w/ 1:200,000 epinephrine at 01:32 CST.         Test dose negative, 3 minutes after injection, for signs of intravascular, subdural, or intrathecal injection.       Insertion/Infusion Method: LORT saline       Aspiration negative for Heme or CSF via Epidural Catheter.       Sensory Level Left: T7.       Sensory Level Right: T7.    Medication(s) Administered   0.125% bupivacaine (Epidural), 10 mL  Fentanyl PF (Epidural), 100 mcg   Comments:  Dural puncture performed with 25g Pencan needle prior to catheter placement

## 2022-02-03 NOTE — PLAN OF CARE
IOL continued overnight for gHTN. BP mild range overnight, remains afebrile. Edema noted in BLE, pt denies any other s/s pre-e. FHR Cat I. Michele applied around 0600, working well, pt excited about wireless monitor. RN adjusting sensitivity on MICHELE, ctx irregular every 2-7 minutes. Pt resting/sleeping most of shift, pitocin increased to 12 mu/min at 0700. Pt did report possible LOF around 0510, she got up to go to bathroom and voided. No LOF observed by RN. Pt applied underwear and pad and instructed to notify RN for continued LOF. Continue with current POC. Report to Cara GUPTA RN and Ayesha JI RN. Care relinquished.

## 2022-02-03 NOTE — ANESTHESIA PREPROCEDURE EVALUATION
Anesthesia Pre-Procedure Evaluation    Patient: Alecia GIBSON Pauser   MRN: [id number] : 1995        Preoperative Diagnosis: * No pre-op diagnosis entered *    Procedure : * No procedures listed *          Past Medical History:   Diagnosis Date     Anemia 2012     Depression, major, single episode, moderate (H) 2012     Uncomplicated asthma      Varicella       Past Surgical History:   Procedure Laterality Date     DENTAL SURGERY      wisdom teeth      Allergies   Allergen Reactions     Seasonal Allergies       Social History     Tobacco Use     Smoking status: Former Smoker     Packs/day: 0.50     Types: Cigarettes     Start date: 2012     Quit date: 2021     Years since quittin.6     Smokeless tobacco: Never Used   Substance Use Topics     Alcohol use: Not Currently     Alcohol/week: 0.8 standard drinks     Types: 1 Standard drinks or equivalent per week      Wt Readings from Last 1 Encounters:   22 117.2 kg (258 lb 6.4 oz)        Anesthesia Evaluation   Pt has had prior anesthetic. Type: Regional.    No history of anesthetic complications       ROS/MED HX  ENT/Pulmonary:     (+) asthma (Exercise inducedl hasn't needed albuterol in >10 years)     Neurologic:  - neg neurologic ROS     Cardiovascular:     (+) --PIH and Mg ++ gtt, Mild/mod and BP Meds ---    METS/Exercise Tolerance:     Hematologic:  - neg hematologic  ROS     Musculoskeletal:       GI/Hepatic:  - neg GI/hepatic ROS     Renal/Genitourinary:       Endo:     (+) Obesity,     Psychiatric/Substance Use:  - neg psychiatric ROS     Infectious Disease:       Malignancy:       Other:     (-) previous        Physical Exam    Airway        Mallampati: III   TM distance: > 3 FB   Neck ROM: full   Mouth opening: > 3 cm    Respiratory Devices and Support         Dental  no notable dental history         Cardiovascular   cardiovascular exam normal          Pulmonary   pulmonary exam normal                OUTSIDE LABS:  CBC:    Lab Results   Component Value Date    WBC 10.2 2022    WBC 9.7 2022    HGB 11.0 (L) 2022    HGB 10.4 (L) 2022    HCT 35.1 2022    HCT 34.5 (L) 2022     2022     2022     BMP:   Lab Results   Component Value Date     2010    POTASSIUM 3.7 2010    CHLORIDE 104 2010    CO2 24 2010    BUN 10 2010    CR 0.59 2022    CR 0.69 2022    GLC 98 2010     COAGS: No results found for: PTT, INR, FIBR  POC:   Lab Results   Component Value Date    HCG Positive (A) 2021     HEPATIC:   Lab Results   Component Value Date    ALBUMIN 4.3 2010    PROTTOTAL 7.1 2010    ALT 18 2022    AST 21 2022    ALKPHOS 76 2010    BILITOTAL 0.4 2010     OTHER:   Lab Results   Component Value Date    HEAVEN 9.3 2010    LIPASE 61 2010       Anesthesia Plan    ASA Status:  3, emergent    NPO Status:  ELEVATED Aspiration Risk/Unknown    Anesthesia Type: Epidural.              Consents    Anesthesia Plan(s) and associated risks, benefits, and realistic alternatives discussed. Questions answered and patient/representative(s) expressed understanding.     - Discussed: Risks, Benefits and Alternatives for BOTH SEDATION and the PROCEDURE were discussed     - Discussed with:  Patient, Spouse      - Extended Intubation/Ventilatory Support Discussed: No.      - Patient is DNR/DNI Status: No    Use of blood products discussed: Yes.     - Discussed with: Patient.     - Consented: consented to blood products            Reason for refusal: other.     Postoperative Care    Pain management: Multi-modal analgesia.   PONV prophylaxis: Ondansetron (or other 5HT-3)     Comments:    Other Comments: Alecia is a 27-year-old  at 38w5d admitted for IOL for mild/moderate preeclampsia. No major medical history. Plan for epidural for  Labor analgesia.        neg OB ROS.       London Charlton

## 2022-02-03 NOTE — PROVIDER NOTIFICATION
"   02/03/22 0358   Provider Notification   Provider Name/Title JALIL Garcia CNM   Method of Notification In Department   Notification Reason Status Update     Delayed entry due to patient care.     CNM in department, discussed POC. Pt continues to rest comfortably between cares. Pitocin infusing at 10 mu/min. Writer having difficult time monitoring contractions overnight, troubleshooting at bedside. Pt reports feeling mild \"waves\" and tightening, but not uncomfortable. SVE deferred at this time. Okay per provider to continue with pitocin at 10 mu/min. FHR Cst I. Will notify provider for any changes in maternal or fetal status.   "

## 2022-02-04 LAB
ALT SERPL W P-5'-P-CCNC: 12 U/L (ref 0–50)
ALT SERPL W P-5'-P-CCNC: 14 U/L (ref 0–50)
AST SERPL W P-5'-P-CCNC: 19 U/L (ref 0–45)
AST SERPL W P-5'-P-CCNC: 23 U/L (ref 0–45)
CREAT SERPL-MCNC: 0.74 MG/DL (ref 0.52–1.04)
CREAT SERPL-MCNC: 0.98 MG/DL (ref 0.52–1.04)
ERYTHROCYTE [DISTWIDTH] IN BLOOD BY AUTOMATED COUNT: 15.7 % (ref 10–15)
ERYTHROCYTE [DISTWIDTH] IN BLOOD BY AUTOMATED COUNT: 15.9 % (ref 10–15)
ERYTHROCYTE [DISTWIDTH] IN BLOOD BY AUTOMATED COUNT: 15.9 % (ref 10–15)
GFR SERPL CREATININE-BSD FRML MDRD: 81 ML/MIN/1.73M2
GFR SERPL CREATININE-BSD FRML MDRD: >90 ML/MIN/1.73M2
HCT VFR BLD AUTO: 25 % (ref 35–47)
HCT VFR BLD AUTO: 28.7 % (ref 35–47)
HCT VFR BLD AUTO: 33.8 % (ref 35–47)
HGB BLD-MCNC: 10.7 G/DL (ref 11.7–15.7)
HGB BLD-MCNC: 7.8 G/DL (ref 11.7–15.7)
HGB BLD-MCNC: 9 G/DL (ref 11.7–15.7)
HOLD SPECIMEN: NORMAL
MCH RBC QN AUTO: 26 PG (ref 26.5–33)
MCH RBC QN AUTO: 26.4 PG (ref 26.5–33)
MCH RBC QN AUTO: 26.4 PG (ref 26.5–33)
MCHC RBC AUTO-ENTMCNC: 31.2 G/DL (ref 31.5–36.5)
MCHC RBC AUTO-ENTMCNC: 31.4 G/DL (ref 31.5–36.5)
MCHC RBC AUTO-ENTMCNC: 31.7 G/DL (ref 31.5–36.5)
MCV RBC AUTO: 82 FL (ref 78–100)
MCV RBC AUTO: 84 FL (ref 78–100)
MCV RBC AUTO: 85 FL (ref 78–100)
PLATELET # BLD AUTO: 183 10E3/UL (ref 150–450)
PLATELET # BLD AUTO: 203 10E3/UL (ref 150–450)
PLATELET # BLD AUTO: 225 10E3/UL (ref 150–450)
RBC # BLD AUTO: 2.95 10E6/UL (ref 3.8–5.2)
RBC # BLD AUTO: 3.41 10E6/UL (ref 3.8–5.2)
RBC # BLD AUTO: 4.12 10E6/UL (ref 3.8–5.2)
WBC # BLD AUTO: 13.6 10E3/UL (ref 4–11)
WBC # BLD AUTO: 13.9 10E3/UL (ref 4–11)
WBC # BLD AUTO: 20.8 10E3/UL (ref 4–11)

## 2022-02-04 PROCEDURE — 250N000013 HC RX MED GY IP 250 OP 250 PS 637: Performed by: OBSTETRICS & GYNECOLOGY

## 2022-02-04 PROCEDURE — 120N000002 HC R&B MED SURG/OB UMMC

## 2022-02-04 PROCEDURE — 250N000011 HC RX IP 250 OP 636: Performed by: STUDENT IN AN ORGANIZED HEALTH CARE EDUCATION/TRAINING PROGRAM

## 2022-02-04 PROCEDURE — 250N000011 HC RX IP 250 OP 636: Performed by: ADVANCED PRACTICE MIDWIFE

## 2022-02-04 PROCEDURE — 36415 COLL VENOUS BLD VENIPUNCTURE: CPT | Performed by: STUDENT IN AN ORGANIZED HEALTH CARE EDUCATION/TRAINING PROGRAM

## 2022-02-04 PROCEDURE — 271N000001 HC OR GENERAL SUPPLY NON-STERILE: Performed by: OBSTETRICS & GYNECOLOGY

## 2022-02-04 PROCEDURE — 250N000009 HC RX 250: Performed by: ADVANCED PRACTICE MIDWIFE

## 2022-02-04 PROCEDURE — 250N000013 HC RX MED GY IP 250 OP 250 PS 637: Performed by: STUDENT IN AN ORGANIZED HEALTH CARE EDUCATION/TRAINING PROGRAM

## 2022-02-04 PROCEDURE — 258N000003 HC RX IP 258 OP 636: Performed by: ADVANCED PRACTICE MIDWIFE

## 2022-02-04 PROCEDURE — 272N000001 HC OR GENERAL SUPPLY STERILE: Performed by: OBSTETRICS & GYNECOLOGY

## 2022-02-04 PROCEDURE — 84450 TRANSFERASE (AST) (SGOT): CPT | Performed by: STUDENT IN AN ORGANIZED HEALTH CARE EDUCATION/TRAINING PROGRAM

## 2022-02-04 PROCEDURE — 83735 ASSAY OF MAGNESIUM: CPT | Performed by: STUDENT IN AN ORGANIZED HEALTH CARE EDUCATION/TRAINING PROGRAM

## 2022-02-04 PROCEDURE — 59515 CESAREAN DELIVERY: CPT | Mod: GC | Performed by: OBSTETRICS & GYNECOLOGY

## 2022-02-04 PROCEDURE — 250N000009 HC RX 250

## 2022-02-04 PROCEDURE — 85027 COMPLETE CBC AUTOMATED: CPT | Performed by: STUDENT IN AN ORGANIZED HEALTH CARE EDUCATION/TRAINING PROGRAM

## 2022-02-04 PROCEDURE — 258N000003 HC RX IP 258 OP 636: Performed by: STUDENT IN AN ORGANIZED HEALTH CARE EDUCATION/TRAINING PROGRAM

## 2022-02-04 PROCEDURE — C9290 INJ, BUPIVACAINE LIPOSOME: HCPCS | Performed by: STUDENT IN AN ORGANIZED HEALTH CARE EDUCATION/TRAINING PROGRAM

## 2022-02-04 PROCEDURE — 360N000076 HC SURGERY LEVEL 3, PER MIN: Performed by: OBSTETRICS & GYNECOLOGY

## 2022-02-04 PROCEDURE — 250N000009 HC RX 250: Performed by: OBSTETRICS & GYNECOLOGY

## 2022-02-04 PROCEDURE — 84460 ALANINE AMINO (ALT) (SGPT): CPT | Performed by: STUDENT IN AN ORGANIZED HEALTH CARE EDUCATION/TRAINING PROGRAM

## 2022-02-04 PROCEDURE — 250N000013 HC RX MED GY IP 250 OP 250 PS 637: Performed by: ADVANCED PRACTICE MIDWIFE

## 2022-02-04 PROCEDURE — 370N000017 HC ANESTHESIA TECHNICAL FEE, PER MIN: Performed by: OBSTETRICS & GYNECOLOGY

## 2022-02-04 PROCEDURE — 82565 ASSAY OF CREATININE: CPT | Performed by: STUDENT IN AN ORGANIZED HEALTH CARE EDUCATION/TRAINING PROGRAM

## 2022-02-04 PROCEDURE — 710N000010 HC RECOVERY PHASE 1, LEVEL 2, PER MIN: Performed by: OBSTETRICS & GYNECOLOGY

## 2022-02-04 PROCEDURE — 250N000009 HC RX 250: Performed by: STUDENT IN AN ORGANIZED HEALTH CARE EDUCATION/TRAINING PROGRAM

## 2022-02-04 RX ORDER — DEXTROSE, SODIUM CHLORIDE, SODIUM LACTATE, POTASSIUM CHLORIDE, AND CALCIUM CHLORIDE 5; .6; .31; .03; .02 G/100ML; G/100ML; G/100ML; G/100ML; G/100ML
INJECTION, SOLUTION INTRAVENOUS CONTINUOUS
Status: DISCONTINUED | OUTPATIENT
Start: 2022-02-04 | End: 2022-02-07

## 2022-02-04 RX ORDER — CITRIC ACID/SODIUM CITRATE 334-500MG
SOLUTION, ORAL ORAL
Status: DISCONTINUED
Start: 2022-02-04 | End: 2022-02-04 | Stop reason: HOSPADM

## 2022-02-04 RX ORDER — LIDOCAINE HCL/EPINEPHRINE/PF 2%-1:200K
VIAL (ML) INJECTION PRN
Status: DISCONTINUED | OUTPATIENT
Start: 2022-02-04 | End: 2022-02-04

## 2022-02-04 RX ORDER — TRANEXAMIC ACID 10 MG/ML
1 INJECTION, SOLUTION INTRAVENOUS EVERY 30 MIN PRN
Status: DISCONTINUED | OUTPATIENT
Start: 2022-02-04 | End: 2022-02-07 | Stop reason: HOSPADM

## 2022-02-04 RX ORDER — CEFAZOLIN SODIUM 2 G/100ML
2 INJECTION, SOLUTION INTRAVENOUS SEE ADMIN INSTRUCTIONS
Status: DISCONTINUED | OUTPATIENT
Start: 2022-02-04 | End: 2022-02-04 | Stop reason: HOSPADM

## 2022-02-04 RX ORDER — DIPHENHYDRAMINE HYDROCHLORIDE 50 MG/ML
25 INJECTION INTRAMUSCULAR; INTRAVENOUS EVERY 6 HOURS PRN
Status: DISCONTINUED | OUTPATIENT
Start: 2022-02-04 | End: 2022-02-07 | Stop reason: HOSPADM

## 2022-02-04 RX ORDER — METHYLERGONOVINE MALEATE 0.2 MG/ML
200 INJECTION INTRAVENOUS
Status: DISCONTINUED | OUTPATIENT
Start: 2022-02-04 | End: 2022-02-07 | Stop reason: HOSPADM

## 2022-02-04 RX ORDER — ONDANSETRON 2 MG/ML
INJECTION INTRAMUSCULAR; INTRAVENOUS PRN
Status: DISCONTINUED | OUTPATIENT
Start: 2022-02-04 | End: 2022-02-04

## 2022-02-04 RX ORDER — OXYTOCIN 10 [USP'U]/ML
10 INJECTION, SOLUTION INTRAMUSCULAR; INTRAVENOUS
Status: CANCELLED | OUTPATIENT
Start: 2022-02-04

## 2022-02-04 RX ORDER — ONDANSETRON 2 MG/ML
4 INJECTION INTRAMUSCULAR; INTRAVENOUS EVERY 6 HOURS PRN
Status: DISCONTINUED | OUTPATIENT
Start: 2022-02-04 | End: 2022-02-07 | Stop reason: HOSPADM

## 2022-02-04 RX ORDER — AZITHROMYCIN 500 MG/5ML
500 INJECTION, POWDER, LYOPHILIZED, FOR SOLUTION INTRAVENOUS
Status: COMPLETED | OUTPATIENT
Start: 2022-02-04 | End: 2022-02-04

## 2022-02-04 RX ORDER — ONDANSETRON 4 MG/1
4 TABLET, ORALLY DISINTEGRATING ORAL EVERY 30 MIN PRN
Status: DISCONTINUED | OUTPATIENT
Start: 2022-02-04 | End: 2022-02-04 | Stop reason: HOSPADM

## 2022-02-04 RX ORDER — HYDROCORTISONE 2.5 %
CREAM (GRAM) TOPICAL 3 TIMES DAILY PRN
Status: DISCONTINUED | OUTPATIENT
Start: 2022-02-04 | End: 2022-02-07 | Stop reason: HOSPADM

## 2022-02-04 RX ORDER — OXYTOCIN 10 [USP'U]/ML
10 INJECTION, SOLUTION INTRAMUSCULAR; INTRAVENOUS
Status: DISCONTINUED | OUTPATIENT
Start: 2022-02-04 | End: 2022-02-04 | Stop reason: HOSPADM

## 2022-02-04 RX ORDER — OXYTOCIN/0.9 % SODIUM CHLORIDE 30/500 ML
340 PLASTIC BAG, INJECTION (ML) INTRAVENOUS CONTINUOUS PRN
Status: DISCONTINUED | OUTPATIENT
Start: 2022-02-04 | End: 2022-02-07 | Stop reason: HOSPADM

## 2022-02-04 RX ORDER — BUPIVACAINE HYDROCHLORIDE 2.5 MG/ML
INJECTION, SOLUTION EPIDURAL; INFILTRATION; INTRACAUDAL
Status: COMPLETED | OUTPATIENT
Start: 2022-02-04 | End: 2022-02-04

## 2022-02-04 RX ORDER — SODIUM CHLORIDE, SODIUM LACTATE, POTASSIUM CHLORIDE, CALCIUM CHLORIDE 600; 310; 30; 20 MG/100ML; MG/100ML; MG/100ML; MG/100ML
INJECTION, SOLUTION INTRAVENOUS CONTINUOUS
Status: DISCONTINUED | OUTPATIENT
Start: 2022-02-04 | End: 2022-02-04 | Stop reason: HOSPADM

## 2022-02-04 RX ORDER — NALOXONE HYDROCHLORIDE 0.4 MG/ML
0.2 INJECTION, SOLUTION INTRAMUSCULAR; INTRAVENOUS; SUBCUTANEOUS
Status: DISCONTINUED | OUTPATIENT
Start: 2022-02-04 | End: 2022-02-07 | Stop reason: HOSPADM

## 2022-02-04 RX ORDER — NALOXONE HYDROCHLORIDE 0.4 MG/ML
0.4 INJECTION, SOLUTION INTRAMUSCULAR; INTRAVENOUS; SUBCUTANEOUS
Status: DISCONTINUED | OUTPATIENT
Start: 2022-02-04 | End: 2022-02-07 | Stop reason: HOSPADM

## 2022-02-04 RX ORDER — IBUPROFEN 800 MG/1
800 TABLET, FILM COATED ORAL EVERY 6 HOURS
Status: DISCONTINUED | OUTPATIENT
Start: 2022-02-05 | End: 2022-02-07 | Stop reason: HOSPADM

## 2022-02-04 RX ORDER — ACETAMINOPHEN 325 MG/1
975 TABLET ORAL EVERY 6 HOURS
Status: DISCONTINUED | OUTPATIENT
Start: 2022-02-04 | End: 2022-02-07 | Stop reason: HOSPADM

## 2022-02-04 RX ORDER — METOCLOPRAMIDE HYDROCHLORIDE 5 MG/ML
10 INJECTION INTRAMUSCULAR; INTRAVENOUS EVERY 6 HOURS PRN
Status: DISCONTINUED | OUTPATIENT
Start: 2022-02-04 | End: 2022-02-07 | Stop reason: HOSPADM

## 2022-02-04 RX ORDER — CARBOPROST TROMETHAMINE 250 UG/ML
250 INJECTION, SOLUTION INTRAMUSCULAR
Status: DISCONTINUED | OUTPATIENT
Start: 2022-02-04 | End: 2022-02-04 | Stop reason: HOSPADM

## 2022-02-04 RX ORDER — MORPHINE SULFATE 1 MG/ML
INJECTION, SOLUTION EPIDURAL; INTRATHECAL; INTRAVENOUS PRN
Status: DISCONTINUED | OUTPATIENT
Start: 2022-02-04 | End: 2022-02-04

## 2022-02-04 RX ORDER — METHYLERGONOVINE MALEATE 0.2 MG/ML
200 INJECTION INTRAVENOUS
Status: DISCONTINUED | OUTPATIENT
Start: 2022-02-04 | End: 2022-02-04 | Stop reason: HOSPADM

## 2022-02-04 RX ORDER — MODIFIED LANOLIN
OINTMENT (GRAM) TOPICAL
Status: DISCONTINUED | OUTPATIENT
Start: 2022-02-04 | End: 2022-02-07 | Stop reason: HOSPADM

## 2022-02-04 RX ORDER — SODIUM CHLORIDE, SODIUM LACTATE, POTASSIUM CHLORIDE, CALCIUM CHLORIDE 600; 310; 30; 20 MG/100ML; MG/100ML; MG/100ML; MG/100ML
INJECTION, SOLUTION INTRAVENOUS CONTINUOUS PRN
Status: DISCONTINUED | OUTPATIENT
Start: 2022-02-04 | End: 2022-02-04

## 2022-02-04 RX ORDER — MISOPROSTOL 200 UG/1
400 TABLET ORAL
Status: DISCONTINUED | OUTPATIENT
Start: 2022-02-04 | End: 2022-02-07 | Stop reason: HOSPADM

## 2022-02-04 RX ORDER — ACETAMINOPHEN 325 MG/1
975 TABLET ORAL ONCE
Status: COMPLETED | OUTPATIENT
Start: 2022-02-04 | End: 2022-02-04

## 2022-02-04 RX ORDER — FENTANYL CITRATE 50 UG/ML
INJECTION, SOLUTION INTRAMUSCULAR; INTRAVENOUS PRN
Status: DISCONTINUED | OUTPATIENT
Start: 2022-02-04 | End: 2022-02-04

## 2022-02-04 RX ORDER — BISACODYL 10 MG
10 SUPPOSITORY, RECTAL RECTAL DAILY PRN
Status: DISCONTINUED | OUTPATIENT
Start: 2022-02-06 | End: 2022-02-07 | Stop reason: HOSPADM

## 2022-02-04 RX ORDER — LOPERAMIDE HCL 2 MG
4 CAPSULE ORAL EVERY 6 HOURS PRN
Status: DISCONTINUED | OUTPATIENT
Start: 2022-02-04 | End: 2022-02-07 | Stop reason: HOSPADM

## 2022-02-04 RX ORDER — OXYCODONE HYDROCHLORIDE 5 MG/1
5 TABLET ORAL EVERY 4 HOURS PRN
Status: DISCONTINUED | OUTPATIENT
Start: 2022-02-04 | End: 2022-02-04

## 2022-02-04 RX ORDER — FENTANYL CITRATE-0.9 % NACL/PF 10 MCG/ML
PLASTIC BAG, INJECTION (ML) INTRAVENOUS CONTINUOUS PRN
Status: DISCONTINUED | OUTPATIENT
Start: 2022-02-04 | End: 2022-02-04

## 2022-02-04 RX ORDER — DIPHENHYDRAMINE HCL 25 MG
25 CAPSULE ORAL EVERY 6 HOURS PRN
Status: DISCONTINUED | OUTPATIENT
Start: 2022-02-04 | End: 2022-02-07 | Stop reason: HOSPADM

## 2022-02-04 RX ORDER — AMOXICILLIN 250 MG
2 CAPSULE ORAL 2 TIMES DAILY
Status: DISCONTINUED | OUTPATIENT
Start: 2022-02-04 | End: 2022-02-07 | Stop reason: HOSPADM

## 2022-02-04 RX ORDER — ONDANSETRON 2 MG/ML
4 INJECTION INTRAMUSCULAR; INTRAVENOUS EVERY 30 MIN PRN
Status: DISCONTINUED | OUTPATIENT
Start: 2022-02-04 | End: 2022-02-04 | Stop reason: HOSPADM

## 2022-02-04 RX ORDER — OXYTOCIN/0.9 % SODIUM CHLORIDE 30/500 ML
340 PLASTIC BAG, INJECTION (ML) INTRAVENOUS CONTINUOUS PRN
Status: COMPLETED | OUTPATIENT
Start: 2022-02-04 | End: 2022-02-04

## 2022-02-04 RX ORDER — OXYTOCIN 10 [USP'U]/ML
10 INJECTION, SOLUTION INTRAMUSCULAR; INTRAVENOUS
Status: DISCONTINUED | OUTPATIENT
Start: 2022-02-04 | End: 2022-02-07 | Stop reason: HOSPADM

## 2022-02-04 RX ORDER — PHENYLEPHRINE HYDROCHLORIDE 10 MG/ML
INJECTION INTRAVENOUS PRN
Status: DISCONTINUED | OUTPATIENT
Start: 2022-02-04 | End: 2022-02-04

## 2022-02-04 RX ORDER — CARBOPROST TROMETHAMINE 250 UG/ML
250 INJECTION, SOLUTION INTRAMUSCULAR
Status: DISCONTINUED | OUTPATIENT
Start: 2022-02-04 | End: 2022-02-07 | Stop reason: HOSPADM

## 2022-02-04 RX ORDER — MAGNESIUM HYDROXIDE 1200 MG/15ML
LIQUID ORAL PRN
Status: DISCONTINUED | OUTPATIENT
Start: 2022-02-04 | End: 2022-02-07 | Stop reason: HOSPADM

## 2022-02-04 RX ORDER — ONDANSETRON 4 MG/1
4 TABLET, ORALLY DISINTEGRATING ORAL EVERY 6 HOURS PRN
Status: DISCONTINUED | OUTPATIENT
Start: 2022-02-04 | End: 2022-02-07 | Stop reason: HOSPADM

## 2022-02-04 RX ORDER — PROCHLORPERAZINE 25 MG
25 SUPPOSITORY, RECTAL RECTAL EVERY 12 HOURS PRN
Status: DISCONTINUED | OUTPATIENT
Start: 2022-02-04 | End: 2022-02-07 | Stop reason: HOSPADM

## 2022-02-04 RX ORDER — OXYTOCIN/0.9 % SODIUM CHLORIDE 30/500 ML
100-340 PLASTIC BAG, INJECTION (ML) INTRAVENOUS CONTINUOUS PRN
Status: CANCELLED | OUTPATIENT
Start: 2022-02-04

## 2022-02-04 RX ORDER — SIMETHICONE 80 MG
80 TABLET,CHEWABLE ORAL 4 TIMES DAILY PRN
Status: DISCONTINUED | OUTPATIENT
Start: 2022-02-04 | End: 2022-02-07 | Stop reason: HOSPADM

## 2022-02-04 RX ORDER — MISOPROSTOL 200 UG/1
400 TABLET ORAL
Status: DISCONTINUED | OUTPATIENT
Start: 2022-02-04 | End: 2022-02-04 | Stop reason: HOSPADM

## 2022-02-04 RX ORDER — MISOPROSTOL 200 UG/1
800 TABLET ORAL
Status: DISCONTINUED | OUTPATIENT
Start: 2022-02-04 | End: 2022-02-07 | Stop reason: HOSPADM

## 2022-02-04 RX ORDER — KETOROLAC TROMETHAMINE 30 MG/ML
30 INJECTION, SOLUTION INTRAMUSCULAR; INTRAVENOUS EVERY 6 HOURS
Status: COMPLETED | OUTPATIENT
Start: 2022-02-04 | End: 2022-02-05

## 2022-02-04 RX ORDER — LIDOCAINE 40 MG/G
CREAM TOPICAL
Status: DISCONTINUED | OUTPATIENT
Start: 2022-02-04 | End: 2022-02-07 | Stop reason: HOSPADM

## 2022-02-04 RX ORDER — TRANEXAMIC ACID 10 MG/ML
1 INJECTION, SOLUTION INTRAVENOUS EVERY 30 MIN PRN
Status: DISCONTINUED | OUTPATIENT
Start: 2022-02-04 | End: 2022-02-04 | Stop reason: HOSPADM

## 2022-02-04 RX ORDER — OXYCODONE HYDROCHLORIDE 5 MG/1
5 TABLET ORAL EVERY 4 HOURS PRN
Status: DISCONTINUED | OUTPATIENT
Start: 2022-02-04 | End: 2022-02-07 | Stop reason: HOSPADM

## 2022-02-04 RX ORDER — ACETAMINOPHEN 325 MG/1
975 TABLET ORAL ONCE
Status: DISCONTINUED | OUTPATIENT
Start: 2022-02-04 | End: 2022-02-07

## 2022-02-04 RX ORDER — METOCLOPRAMIDE 10 MG/1
10 TABLET ORAL EVERY 6 HOURS PRN
Status: DISCONTINUED | OUTPATIENT
Start: 2022-02-04 | End: 2022-02-07 | Stop reason: HOSPADM

## 2022-02-04 RX ORDER — CALCIUM CARBONATE 500 MG/1
1000 TABLET, CHEWABLE ORAL 3 TIMES DAILY PRN
Status: DISCONTINUED | OUTPATIENT
Start: 2022-02-04 | End: 2022-02-07 | Stop reason: HOSPADM

## 2022-02-04 RX ORDER — DIMENHYDRINATE 50 MG/ML
25 INJECTION, SOLUTION INTRAMUSCULAR; INTRAVENOUS
Status: DISCONTINUED | OUTPATIENT
Start: 2022-02-04 | End: 2022-02-04 | Stop reason: HOSPADM

## 2022-02-04 RX ORDER — HYDROMORPHONE HCL IN WATER/PF 6 MG/30 ML
0.2 PATIENT CONTROLLED ANALGESIA SYRINGE INTRAVENOUS EVERY 5 MIN PRN
Status: DISCONTINUED | OUTPATIENT
Start: 2022-02-04 | End: 2022-02-04 | Stop reason: HOSPADM

## 2022-02-04 RX ORDER — FENTANYL CITRATE 50 UG/ML
25 INJECTION, SOLUTION INTRAMUSCULAR; INTRAVENOUS EVERY 5 MIN PRN
Status: DISCONTINUED | OUTPATIENT
Start: 2022-02-04 | End: 2022-02-04 | Stop reason: HOSPADM

## 2022-02-04 RX ORDER — CITRIC ACID/SODIUM CITRATE 334-500MG
30 SOLUTION, ORAL ORAL
Status: COMPLETED | OUTPATIENT
Start: 2022-02-04 | End: 2022-02-04

## 2022-02-04 RX ORDER — CEFAZOLIN SODIUM 2 G/100ML
2 INJECTION, SOLUTION INTRAVENOUS
Status: COMPLETED | OUTPATIENT
Start: 2022-02-04 | End: 2022-02-04

## 2022-02-04 RX ORDER — MEPERIDINE HYDROCHLORIDE 25 MG/ML
12.5 INJECTION INTRAMUSCULAR; INTRAVENOUS; SUBCUTANEOUS EVERY 5 MIN PRN
Status: DISCONTINUED | OUTPATIENT
Start: 2022-02-04 | End: 2022-02-04 | Stop reason: HOSPADM

## 2022-02-04 RX ORDER — PROCHLORPERAZINE MALEATE 10 MG
10 TABLET ORAL EVERY 6 HOURS PRN
Status: DISCONTINUED | OUTPATIENT
Start: 2022-02-04 | End: 2022-02-07 | Stop reason: HOSPADM

## 2022-02-04 RX ORDER — LIDOCAINE 40 MG/G
CREAM TOPICAL
Status: DISCONTINUED | OUTPATIENT
Start: 2022-02-04 | End: 2022-02-04 | Stop reason: HOSPADM

## 2022-02-04 RX ORDER — AMOXICILLIN 250 MG
1 CAPSULE ORAL 2 TIMES DAILY
Status: DISCONTINUED | OUTPATIENT
Start: 2022-02-04 | End: 2022-02-07 | Stop reason: HOSPADM

## 2022-02-04 RX ORDER — MISOPROSTOL 200 UG/1
800 TABLET ORAL
Status: DISCONTINUED | OUTPATIENT
Start: 2022-02-04 | End: 2022-02-04 | Stop reason: HOSPADM

## 2022-02-04 RX ADMIN — ACETAMINOPHEN 975 MG: 325 TABLET, FILM COATED ORAL at 09:52

## 2022-02-04 RX ADMIN — LOPERAMIDE HYDROCHLORIDE 4 MG: 2 CAPSULE ORAL at 07:54

## 2022-02-04 RX ADMIN — SODIUM CITRATE AND CITRIC ACID MONOHYDRATE 30 ML: 500; 334 SOLUTION ORAL at 05:43

## 2022-02-04 RX ADMIN — CARBOPROST TROMETHAMINE 250 MCG: 250 INJECTION, SOLUTION INTRAMUSCULAR at 06:23

## 2022-02-04 RX ADMIN — Medication 100 ML/HR: at 08:38

## 2022-02-04 RX ADMIN — MAGNESIUM SULFATE HEPTAHYDRATE 2 G/HR: 40 INJECTION, SOLUTION INTRAVENOUS at 04:27

## 2022-02-04 RX ADMIN — MISOPROSTOL 400 MCG: 200 TABLET ORAL at 06:19

## 2022-02-04 RX ADMIN — KETOROLAC TROMETHAMINE 30 MG: 30 INJECTION, SOLUTION INTRAMUSCULAR at 06:53

## 2022-02-04 RX ADMIN — MAGNESIUM SULFATE HEPTAHYDRATE 2 G/HR: 40 INJECTION, SOLUTION INTRAVENOUS at 23:45

## 2022-02-04 RX ADMIN — Medication: at 01:40

## 2022-02-04 RX ADMIN — PHENYLEPHRINE HYDROCHLORIDE 100 MCG: 10 INJECTION INTRAVENOUS at 06:17

## 2022-02-04 RX ADMIN — MIDAZOLAM 1 MG: 1 INJECTION INTRAMUSCULAR; INTRAVENOUS at 06:17

## 2022-02-04 RX ADMIN — FENTANYL CITRATE 50 MCG: 50 INJECTION, SOLUTION INTRAMUSCULAR; INTRAVENOUS at 06:17

## 2022-02-04 RX ADMIN — ACETAMINOPHEN 975 MG: 325 TABLET, FILM COATED ORAL at 03:39

## 2022-02-04 RX ADMIN — OXYCODONE HYDROCHLORIDE 5 MG: 5 TABLET ORAL at 11:19

## 2022-02-04 RX ADMIN — ACETAMINOPHEN 975 MG: 325 TABLET, FILM COATED ORAL at 23:46

## 2022-02-04 RX ADMIN — MORPHINE SULFATE 2 MG: 1 INJECTION EPIDURAL; INTRATHECAL; INTRAVENOUS at 06:25

## 2022-02-04 RX ADMIN — LIDOCAINE HYDROCHLORIDE,EPINEPHRINE BITARTRATE 10 ML: 20; .005 INJECTION, SOLUTION EPIDURAL; INFILTRATION; INTRACAUDAL; PERINEURAL at 05:45

## 2022-02-04 RX ADMIN — BUPIVACAINE 20 ML: 13.3 INJECTION, SUSPENSION, LIPOSOMAL INFILTRATION at 07:04

## 2022-02-04 RX ADMIN — OXYCODONE HYDROCHLORIDE 5 MG: 5 TABLET ORAL at 18:18

## 2022-02-04 RX ADMIN — ONDANSETRON 4 MG: 2 INJECTION INTRAMUSCULAR; INTRAVENOUS at 06:17

## 2022-02-04 RX ADMIN — Medication 2 G: at 06:04

## 2022-02-04 RX ADMIN — TRANEXAMIC ACID 1 G: 10 INJECTION, SOLUTION INTRAVENOUS at 06:18

## 2022-02-04 RX ADMIN — Medication 50 MCG/MIN: at 06:10

## 2022-02-04 RX ADMIN — OXYTOCIN-SODIUM CHLORIDE 0.9% IV SOLN 30 UNIT/500ML 300 ML/HR: 30-0.9/5 SOLUTION at 06:14

## 2022-02-04 RX ADMIN — SODIUM CHLORIDE, POTASSIUM CHLORIDE, SODIUM LACTATE AND CALCIUM CHLORIDE 75 ML/HR: 600; 310; 30; 20 INJECTION, SOLUTION INTRAVENOUS at 13:47

## 2022-02-04 RX ADMIN — PHENYLEPHRINE HYDROCHLORIDE 100 MCG: 10 INJECTION INTRAVENOUS at 06:10

## 2022-02-04 RX ADMIN — OXYCODONE HYDROCHLORIDE 5 MG: 5 TABLET ORAL at 23:47

## 2022-02-04 RX ADMIN — MIDAZOLAM 1 MG: 1 INJECTION INTRAMUSCULAR; INTRAVENOUS at 06:32

## 2022-02-04 RX ADMIN — KETOROLAC TROMETHAMINE 30 MG: 30 INJECTION, SOLUTION INTRAMUSCULAR at 14:01

## 2022-02-04 RX ADMIN — SODIUM CHLORIDE, POTASSIUM CHLORIDE, SODIUM LACTATE AND CALCIUM CHLORIDE: 600; 310; 30; 20 INJECTION, SOLUTION INTRAVENOUS at 06:12

## 2022-02-04 RX ADMIN — BUPIVACAINE HYDROCHLORIDE 20 ML: 2.5 INJECTION, SOLUTION EPIDURAL; INFILTRATION; INTRACAUDAL at 07:04

## 2022-02-04 RX ADMIN — LIDOCAINE HYDROCHLORIDE,EPINEPHRINE BITARTRATE 5 ML: 20; .005 INJECTION, SOLUTION EPIDURAL; INFILTRATION; INTRACAUDAL; PERINEURAL at 06:06

## 2022-02-04 RX ADMIN — Medication 500 MG: at 06:04

## 2022-02-04 RX ADMIN — CALCIUM CARBONATE (ANTACID) CHEW TAB 500 MG 1000 MG: 500 CHEW TAB at 00:48

## 2022-02-04 RX ADMIN — ACETAMINOPHEN 975 MG: 325 TABLET, FILM COATED ORAL at 16:04

## 2022-02-04 RX ADMIN — KETOROLAC TROMETHAMINE 30 MG: 30 INJECTION, SOLUTION INTRAMUSCULAR at 20:45

## 2022-02-04 RX ADMIN — FENTANYL CITRATE 50 MCG: 50 INJECTION, SOLUTION INTRAMUSCULAR; INTRAVENOUS at 06:22

## 2022-02-04 NOTE — PROVIDER NOTIFICATION
02/04/22 0303   Provider Notification   Provider Name/Title GIOVANNI Moser   Method of Notification Electronic Page   Request Evaluate - Remote   Notification Reason Maternal Vital Sign Change     Patient with low grade temp, 99.3. Reports feeling chills and not feeling well. Provider notified. CAROLEM to come to bedside to evaluate labor status and update POC.     ADDENDUM @ 0310: CNM at bedside. SVE unchanged, some cervical swelling noted. Provider recommending proceeding with c/s at this time d/t minimal/no change in labor progress. GIOVANNI Cesar to contact OB team. Will initiate pre-op cares.     ADDENDUM @ 0400: pre-op cares completed. Delayed case due to emergent c/s. FHR tracing currently Cat II with minimal variability, no decels. Pitocin infusion discontinued. Magnesium continues at 2g/hr. Will continue close monitoring of maternal and fetal status.

## 2022-02-04 NOTE — ANESTHESIA POSTPROCEDURE EVALUATION
Patient: Alecia GIBSON Pausehugh    Procedure: Procedure(s):   SECTION       Diagnosis:Failure to progress in labor [O62.2]  Diagnosis Additional Information: No value filed.    Anesthesia Type:  Epidural    Note:  Disposition: Inpatient   Postop Pain Control: Uneventful            Sign Out: Well controlled pain   PONV: No   Neuro/Psych: Uneventful            Sign Out: Acceptable/Baseline neuro status   Airway/Respiratory: Uneventful            Sign Out: Acceptable/Baseline resp. status   CV/Hemodynamics: Uneventful            Sign Out: Acceptable CV status; No obvious hypovolemia; No obvious fluid overload   Other NRE: NONE   DID A NON-ROUTINE EVENT OCCUR? No     Epidural-to- Updated ASA: 3      Last vitals:  Vitals Value Taken Time   /77 22 0900   Temp 36.4  C (97.6  F) 22 09   Pulse 112 22 0900   Resp 20 22 0845   SpO2 94 % 22       Electronically Signed By: Izzy Humphreys MD  2022  12:04 PM

## 2022-02-04 NOTE — BRIEF OP NOTE
[facility]   BRIEF OPERATIVE NOTE:  SECTION    Surgery Date:  2022   Surgeon(s): Heather Marino MD  Assistants:  Annette Graf MD, PGY-2    Preoperative Diagnoses:  -  at 38w6d   - Pre-eclampsia with severe features  - Arrest of dilation  - Asthma     Postoperative diagnoses:  -  at 38w6d, now delivered  - Pre-eclampsia with severe features  - Arrest of dilation  - Asthma     Procedure performed: Primary low segment transverse  section via Pfannenstiel skin incision with double layer uterine closure    Anesthesia: Epidural, bolused  QBL (mL): 1880 mL  Fluid replacement: 900 mL crystalloid.   Specimens: Placenta (not sent), cord gases  Complications: PPH secondary to atony      Operative findings:   - Single, liveborn female infant at 0613 hours on 2022. No nuchal. Apgars of 8 and 9 at one and five minutes. Birth weight: 3280 g.  Fetal presentation: Vertex. Amniotic fluid: Clear.    - Arterial Cord pH 7.32 with base excess -2.9. Venous Cord pH 7.34 with base excess -3.1.    - Placenta intact with 3 vessel cord.     - Normal appearing uterus, fallopian tubes, ovaries.   - No recto-fascial adhesions. No intraabdominal adhesions. No abdominal wall adhesions.   - Good uterine tone.       Transferred to PACU in stable condition.     Annette Graf MD  OB/GYN Resident PGY-2  7:07 AM 2022

## 2022-02-04 NOTE — PROVIDER NOTIFICATION
02/04/22 0131   Provider Notification   Provider Name/Title MALINI Dowd   Method of Notification Phone   Request Evaluate in Person   Notification Reason Pain     Pt reports feeling more discomfort in R hip/back around 0030, pt tearful and awakened from sleep with pain. Pt repositioned on both sides, utilizing epidural bolus when able. Despite interventions, pt continue to report no change in pain level. Feels like she can feel her ctx across her entire abdomen. Dr. Dowd at bedside to evaluate and administer additional bolus. Pt positioned into supine position to promote even anesthesia level. Frequent BP initiated. FHR Cat I. Phenylephrine at bedside. Will continue close monitoring.

## 2022-02-04 NOTE — PROVIDER NOTIFICATION
02/04/22 1018   Provider Notification   Provider Name/Title Oleg, G2   Method of Notification Electronic Page   Request Evaluate-Remote   Notification Reason Status Update   FYI Sepsis protocol triggered, , temp 98.4, /64, RR 16, SpO2 96%

## 2022-02-04 NOTE — ANESTHESIA CARE TRANSFER NOTE
Patient: Alecia Saldaña    Procedure: Procedure(s):   SECTION       Diagnosis: Failure to progress in labor [O62.2]  Diagnosis Additional Information: No value filed.    Anesthesia Type:   Epidural     Note:    Oropharynx: oropharynx clear of all foreign objects  Level of Consciousness: awake  Oxygen Supplementation: room air    Independent Airway: airway patency satisfactory and stable  Dentition: dentition unchanged  Vital Signs Stable: post-procedure vital signs reviewed and stable  Report to RN Given: handoff report given  Patient transferred to: PACU  Comments: Epidural catheter was removed in the OR prior to transfer to PACU. Tip intact.   PACU nurse aware.   Handoff Report: Identifed the Patient, Identified the Reponsible Provider, Reviewed the pertinent medical history, Discussed the surgical course, Reviewed Intra-OP anesthesia mangement and issues during anesthesia, Set expectations for post-procedure period and Allowed opportunity for questions and acknowledgement of understanding      Vitals:  Vitals Value Taken Time   BP     Temp     Pulse     Resp     SpO2         Electronically Signed By: Jaden Lim MD  2022  7:07 AM

## 2022-02-04 NOTE — PROVIDER NOTIFICATION
22 0025   Provider Notification   Provider Name/Title Ehsan DAVILA   Method of Notification At Bedside   Notification Reason Status Update   Provider at bedside. Provider evaluated pt's stabbing pain in right back/hip area. Provider discussed using Tums for better muscle contraction. Possible  was discussed if contractions do not progress. Cierra Weber on 2022 at 1:17 AM

## 2022-02-04 NOTE — ANESTHESIA PROCEDURE NOTES
TAP Procedure Note    Pre-Procedure   Staff -        Anesthesiologist:  Izzy Nicholas MD       Resident/Fellow: Jaden Lim MD       Other Anesthesia Staff: London Charlton       Performed By: resident       Location: OB       Procedure Start/Stop Times: 2/4/2022 7:04 AM       Pre-Anesthestic Checklist: patient identified, IV checked, site marked, risks and benefits discussed, informed consent, monitors and equipment checked, pre-op evaluation, at physician/surgeon's request and post-op pain management  Timeout:       Correct Patient: Yes        Correct Procedure: Yes        Correct Site: Yes        Correct Position: Yes        Correct Laterality: Yes        Site Marked: Yes  Procedure Documentation  Procedure: TAP       Diagnosis: POST OPERATIVE PAIN       Laterality: bilateral       Patient Position: supine       Patient Prep/Sterile Barriers: sterile gloves, mask       Skin prep: Chloraprep       Needle Type: short bevel       Needle Gauge: 21.        Needle Length (millimeters): 110        Ultrasound guided       1. Ultrasound was used to identify targeted nerve, plexus, vascular marker, or fascial plane and place a needle adjacent to it in real-time.       2. Ultrasound was used to visualize the spread of anesthetic in close proximity to the above referenced structure.       3. A permanent image is entered into the patient's record.    Assessment/Narrative         The placement was negative for: blood aspirated, painful injection and site bleeding       Paresthesias: No.     Bolus given via needle..        Secured via.        Insertion/Infusion Method: Single Shot       Complications: none       Injection made incrementally with aspirations every 5 mL.    Medication(s) Administered   Bupivacaine 0.25% PF (Infiltration), 20 mL  Bupivacaine liposome (Exparel) 1.3% LA inj susp (Infiltration), 20 mL  Medication Administration Time: 2/4/2022 7:04 AM

## 2022-02-04 NOTE — PLAN OF CARE
Data: Alecia GIBSON Pausehugh transferred to [id number] via cart at 0920. Baby transferred via parent's arms.  Action: Receiving unit notified of transfer: Yes. Patient and family notified of room change. Report given to Janice COHEN at 0910. Belongings sent to receiving unit. Accompanied by Registered Nurse. Oriented patient to surroundings. Call light within reach. ID bands double-checked with receiving RN.  Response: Patient tolerated transfer and is stable.    Per provider hassan to stay in until post-op day 1 due to pre-eclampsia, strict I/O and increased blood loss at delivery.

## 2022-02-04 NOTE — PROGRESS NOTES
OB/GYN Postpartum Magnesium Check Note     S: Patient doing well at this time. Denies headache, blurry vision, SOB, N/V, RUQ or epigastric pain.    O:  /65   Pulse 103   Temp 98  F (36.7  C) (Oral)   Resp 18   LMP 2021   SpO2 93%   Breastfeeding Unknown     General: AAOx3, NAD  CV: RRR, no murmurs, rubs, or gallops  Resp: CTAB, no wheezes, rales, or rhonchi  Ext: Reflexes 1+ bilateral patellar and brachioradial, no clonus; 2+ prepatellar pitting edema on bilateral LE    UOP: Wells in place: 275cc since 0800: 0.47 ml/kg/hr.     A/P: Alecia GIBSON Pauser is a 27 year old  now POD#0 s/p PLTCS for arrest of dilation. Transfer of care from [facility] service. Intrapartum course complicated by development of Preeclampsia with Severe Features - now on magnesium for seizure prophylaxis. Currently doing well with no signs of toxicity. BPs currently normotensive.     Pre-eclampsia with Severe Features (BP)   - Based on sustained SR BP requiring IV antihypertensives.   - Now s/p IV labetalol 20mg x2 (1052, 1224 on 2/3/22)  - Mg 4g (1114 on ) > 2g/h at this time.   - No signs of Mg toxicity. Mg lvl prn.   - Q4h Mg check - next ~ 1730     POD#0 s/p PLTCS   - Hgb 11.0 > QBL 1880 (miso, TXA, hem) > 10.7 > 9 at 1000AM after surgery.   - Plan to keep Wells in until POD#1   - Lovenox to start POD#1.     Josseline Dejesus MD  OB/GYN PGY-2  22 1:00PM

## 2022-02-04 NOTE — PROGRESS NOTES
S: Alecia is more uncomfortable, feeling cramping in her low belly. Feeling shakey and not well. Has had IUPC in place for 4 hours, and titrating pitocin back up.     O:  Blood pressure 137/71, pulse 108, temperature 99.3  F (37.4  C), temperature source Oral, resp. rate 20, last menstrual period 05/08/2021, SpO2 98 %, not currently breastfeeding.  General appearance: uncomfortable with contractions    CONTACTIONS: Contractions every 6-8 minutes.  Palpate: moderate  FETAL HEART TONES: baseline 145 with minimal FHR variability and    accelerations yes. Decelerations no.    ROM: clear fluid  PELVIC EXAM: PELVIC EXAM: 4.5/ 70/ Anterior/ average/ -1   Fetal Position:  cephalic  Bloody show: No  Pitocin- 18 mu/min.,  Antibiotics- none  Cervical ripening: N/A    ASSESSMENT:  ==============  IUP @ 38w6d minimal/no progress   Fetal Heart rate tracing category one  GBS- negative  Preeclampsia with SF on Magnesium infusion     PLAN:  ===========  Given no progress and now with cervical swelling, recommend C/S per algorithm.   Discussed with Dr. Marino, she will come consent patient for procedure.  Prepare for C/S    Arsenio Moser CNM

## 2022-02-04 NOTE — PROGRESS NOTES
S: Alecia is comfortable with her epidural. Feeling some pressure on her pubic bone. Has been changing positions frequently in an attempt to optimize fetal positioning.     O:  Blood pressure 132/73, pulse 108, temperature 98.2  F (36.8  C), temperature source Oral, resp. rate 16, last menstrual period 2021, SpO2 95 %, not currently breastfeeding.  General appearance: comfortable    CONTACTIONS: Contractions irregular with harry monitor.  Palpate: moderate  FETAL HEART TONES: baseline 120 with moderate FHR variability and    accelerations yes. Decelerations no.    ROM: clear fluid  PELVIC EXAM: PELVIC EXAM: / Anterior/ soft/ -1 unchanged  Fetal Position:  cephalic  Bloody show: No  Pitocin- 24 mu/min.,  Antibiotics- none  Cervical ripening: N/A    LABOR PROGRESS:  22:   1500 Admit for induction of labor GHTN  1645: 0-/-2/mid/soft  1750: Miso #1  2210: Miso #2/Morphine & Vistaril      22:  0210: Miso #3  0712: Miso #4  0800: /-1/mid/soft  1009: SVE /-1  1030: miso #5  1515: Miso #6  1917: SVE /-1/mid/soft  2002: Pitocin started     2/3/22:   0907: SVE /-2/posterior/med  0909: Pitocin stopped  0927: IV fentanyl/Wells placed  0947: Pitocin restarted  1052: Labetalol #1 for Sustained Severe Range BP  1114: Mag infusion started  1224: Labetalol #2 for Sustained Severe Range BP  1325: Epidural placed   1553: SVE 5.5/90/-2/post/med; Wells out; Pit @ 6mu  1815: SVE /-1/mid/soft; AROM clear; Pit @ 20mu  2241: SVE unchanged; /-1; IUPC/FSE placed Pitocin at 24mu  2247: Pitocin stopped  232: Pitocin restared at 12mu      ASSESSMENT:  ==============  IUP @ 38w5d active labor and minimal/no progress   Fetal Heart rate tracing category one  GBS- negative  Preeclampsia with SF     PLAN:  ===========  comfort measures prn   Pain medication continue epidural  Anticipate   reevaluate in 2-4 hours/PRN   Discussed placing internal monitors to better evaluate contractions and allow us  to better manage pitocin. Alecia agrees to internal monitors. IUPC/scalp electrode placed.  Plan pitocin break for 30 min, then restart at 12mu    Arsenio Moser CNM

## 2022-02-04 NOTE — PROGRESS NOTES
S: Alecia is comfortable, relieved that the Cheri monitor is working again. She is not feeling much rectal pressure. Complains of slight headache. Contractions seem stronger to her and bedside RN.    O:  Blood pressure 128/65, pulse 108, temperature 97.7  F (36.5  C), resp. rate 20, last menstrual period 2021, SpO2 100 %, not currently breastfeeding.  General appearance: comfortable    CONTACTIONS: Contractions every 1-7 minutes.  Palpate: moderate   FETAL HEART TONES: baseline 125 with moderate FHR variability and    accelerations yes. Decelerations no.    ROM: clear fluid  PELVIC EXAM: PELVIC EXAM: / Mid/ soft/ -1   Fetal Position:  cephalic  Bloody show: Yes   Pitocin- 20 mu/min.,  Antibiotics- none  Cervical ripening: N/A    LABOR PROGRESS:  22:   1500 Admit for induction of labor GHTN  1645: 0-/-2/mid/soft  1750: Miso #1  2210: Miso #2/Morphine & Vistaril     22:  0210: Miso #3  0712: Miso #4  0800: /-1/mid/soft  1009: SVE /-1  1030: miso #5  1515: Miso #6  1917: SVE /-1/mid/soft  2002: Pitocin started    2/3/22:   0907: SVE /-2/posterior/med  0909: Pitocin stopped  0927: IV fentanyl/Wells placed  0947: Pitocin restarted  1052: Labetalol #1 for Sustained Severe Range BP  1114: Mag infusion started  1224: Labetalol #2 for Sustained Severe Range BP  1325: Epidural placed   1553: SVE 5.5/90/-2/post/med; Wells out; Pit @ 6mu  1815: SVE /-1/mid/soft; AROM clear; Pit @ 20mu    ASSESSMENT:  ==============  IUP @ 38w5d active labor   Fetal Heart rate tracing category one  GBS- negative  Pre-eclampsia with SF on Magnesium     PLAN:  ===========  comfort measures prn   Pain medication continue epidural  Anticipate   Labor induction with Pitocin  reevaluate in 2-4 hours/PRN   Tylenol for headache    Arsenio Moser CNM

## 2022-02-04 NOTE — DISCHARGE SUMMARY
[facility] Discharge Summary    Alecia Saldaña MRN# [id number]   Age: 27 year old YOB: 1995     Date of Admission:  2022  Date of Discharge::  22  Admitting Physician:  SHAWN Mae CNDONOVAN  Discharge Physician:  Jennifer Charles MD             Admission Diagnoses:   Intrauterine pregnancy at 38w6d  Pre-eclampsia with severe features  Arrest of dilation  Asthma           Discharge Diagnosis:     Same, delivered   Pre-eclampsia with severe features  Arrest of dilation  Asthma           Procedures:     Procedure(s): Primary low transverse  section with two layer closure via Pfannenstiel skin incision  Epidural                 Medications Prior to Admission:     Medications Prior to Admission   Medication Sig Dispense Refill Last Dose     calcium carbonate (TUMS) 500 MG chewable tablet Take 1 chew tab by mouth 2 times daily as needed for heartburn   2022 at Unknown time     hydrOXYzine (VISTARIL) 25 MG capsule Take 1 capsule (25 mg) by mouth 4 times daily as needed for itching 60 capsule 0 2022 at 2100     Prenatal MV-Min-Fe Fum-FA-DHA (PRENATAL+DHA PO)    2022 at 1200     ADDERALL XR 30 MG 24 hr capsule  (Patient not taking: Reported on 8/10/2021)                Discharge Medications:        Review of your medicines      START taking      Dose / Directions   acetaminophen 325 MG tablet  Commonly known as: TYLENOL  Used for:  delivery delivered      Dose: 650 mg  Take 2 tablets (650 mg) by mouth every 6 hours as needed for mild pain Start after Delivery.  Quantity: 100 tablet  Refills: 0     hydrochlorothiazide 12.5 MG tablet  Commonly known as: HYDRODIURIL  Used for: Severe pre-eclampsia in third trimester      Dose: 25 mg  Take 2 tablets (25 mg) by mouth daily  Quantity: 7 tablet  Refills: 0     ibuprofen 600 MG tablet  Commonly known as: ADVIL/MOTRIN  Used for:  delivery delivered      Dose: 600 mg  Take 1 tablet (600 mg) by mouth every  6 hours as needed for moderate pain Start after delivery  Quantity: 60 tablet  Refills: 0     oxyCODONE 5 MG tablet  Commonly known as: ROXICODONE  Used for:  delivery delivered      Dose: 5 mg  Take 1 tablet (5 mg) by mouth every 4 hours as needed for moderate to severe pain  Quantity: 10 tablet  Refills: 0     senna-docusate 8.6-50 MG tablet  Commonly known as: SENOKOT-S/PERICOLACE  Used for:  delivery delivered      Dose: 1 tablet  Take 1 tablet by mouth daily Start after delivery.  Quantity: 100 tablet  Refills: 0        CONTINUE these medicines which have NOT CHANGED      Dose / Directions   Adderall XR 30 MG 24 hr capsule  Generic drug: amphetamine-dextroamphetamine      Refills: 0     calcium carbonate 500 MG chewable tablet  Commonly known as: TUMS      Dose: 1 chew tab  Take 1 chew tab by mouth 2 times daily as needed for heartburn  Refills: 0     hydrOXYzine 25 MG capsule  Commonly known as: VISTARIL  Used for: Itching      Dose: 25 mg  Take 1 capsule (25 mg) by mouth 4 times daily as needed for itching  Quantity: 60 capsule  Refills: 0     PRENATAL+DHA PO      Refills: 0           Where to get your medicines      These medications were sent to [facility] [address]  [address] [address]    Phone: [phone]     acetaminophen 325 MG tablet    hydrochlorothiazide 12.5 MG tablet    ibuprofen 600 MG tablet    oxyCODONE 5 MG tablet    senna-docusate 8.6-50 MG tablet            Consultations:   Ob/Gyn   Anesthesia           Brief Admission History:     Alecia Saldaña is a 27 year old  who was admitted at 38w6d by LMP c/w 6w6d ultrasound for IOL for gestational HTN. Her induction was started and she had severe range pressures >4h apart, meeting diagnostic criteria for pre-eclampsia with severe features. She had a prolonged labor course, making it to 6 cm. She underwent AROM and continued to have inadequate contractions  despite uptitration of pitocin. She met criteria for arrest of dilation after 6 hours without change and recommendation for  delivery was made. The risks, benefits, and alternatives of  delivery were explained and the patient agreed to proceed. Pregnancy also complicated by history of asthma.      Intraoperative course     The procedure was complicated by PPH.  EBL 1880 mL.  See operative report for details.   Operative findings:   - Single, liveborn female infant at 0613 hours on 2022. No nuchal. Apgars of 8 and 9 at one and five minutes. Birth weight: 3280 g.  Fetal presentation: Vertex. Amniotic fluid: Clear.    - Arterial Cord pH 7.32 with base excess -2.9. Venous Cord pH 7.34 with base excess -3.1.    - Placenta intact with 3 vessel cord.     - Normal appearing uterus, fallopian tubes, ovaries.   - No recto-fascial adhesions. No intraabdominal adhesions. No abdominal wall adhesions.   - Good uterine tone.        Postpartum Course   The patient's hospital course was unremarkable. She received 24 hours of magnesium postpartum which she tolerated without difficulty. Her blood pressures remained well controlled without PO antihypertensives. She recovered as anticipated and experienced no post-operative complications. On discharge, her pain was well controlled. Vaginal bleeding is similar to light menstrual flow.  Voiding without difficulty.  Ambulating well and tolerating a normal diet.  No fever or significant wound drainage.  Breastfeeding well.  Infant is stable.  No bowel movement yet.  She was discharged on post-partum day #3.    Post-partum hemoglobin:   Hemoglobin   Date Value Ref Range Status   2022 7.6 (L) 11.7 - 15.7 g/dL Final   2012 12.6 11.8 - 15.5 g/dl Final             Discharge Instructions and Follow-Up:     Discharge diet: Regular   Discharge activity: No lifting greater than 20 lbs, pushing, pulling, or other strenuous activity for 6 weeks. Pelvic rest for 6 weeks  including no sexual intercourse, tampons, or douching. No driving until you can slam on the brakes without pain or while on narcotic pain medications.    Discharge follow-up: Follow up with primary OB for routine postpartum visit in 6 weeks and in 1 week for a BP check   Wound care: Keep incision clean and dry           Discharge Disposition:     Discharged to home      Yuki Ruby MD  Obstetrics and Gynecology, PGY-3  02/07/22 6:32 AM

## 2022-02-04 NOTE — PROVIDER NOTIFICATION
02/04/22 0322   Provider Notification   Provider Name/Title MD Jamila, MD Homar and GIOVANNI Moser   Method of Notification At Bedside   Notification Reason Status Update  (c/s consent )     Providers at bedside to consent for c/s. Pt agreeable to proceed at this time. Consents signs. Writer to continue with pre-op cares.

## 2022-02-04 NOTE — PROVIDER NOTIFICATION
02/03/22 4257   Provider Notification   Provider Name/Title GIOVANNI Moser   Method of Notification In Department   Request Evaluate in Person   Notification Reason Labor Status;SVE     Delayed entry due to patient care.     JULIUS Moser CNM at bedside to evaluate labor status. Epidural working well. Pitocin infusing at 24 mu/min. Pt continues to ctx irregularly. CNM at bedside to discuss placement of internal monitors, pt consents to placement and SVE. SVE remains unchanged. IUPC and FSE placed, pt tolerated well. Moderate amount of clear, pink-tinged fluid leaking with placement. Plan per provider to discontinue pitocin for 30 minutes, and re-start at 12 mu. RN to continue to provider labor support with continued position changes. Writer to notify provider of any changes in maternal or fetal status.

## 2022-02-04 NOTE — PROGRESS NOTES
OR to PACU Transfer Note  Data: Pt to OB PACU at 0709 via cart. PIV infusing NS with pitocin without complications, continuous magnesium infusing at 2 g/hr, hassan with yellow, clear urine to gravity, temp 98.6, vss, pt does not complain of pain and/or nausea.   Interventions: IV to pump, monitors and alarms on, SCD on.  Response: stable.  Plan: Patient instructed to notify RN for pain or nausea, routine post op cares, initiate breastfeeding/pumping as soon as patient/infant able. Report given to Nasreen JIMENES RN. Care relinquished at 0725.

## 2022-02-04 NOTE — PLAN OF CARE
/65   Pulse 108   Temp 97.7  F (36.5  C)   Resp 20   LMP 05/08/2021   SpO2 98%     PT has magnesium sulfate infusing for pre-eclampsia. HELLP labs WDL. Proteinuria. Reflexes +2-+3, 1 beat of clonus. Mild headache noted, CNM aware. Tylenol administered and will reassess pain.  Last dose of labetolol at 1621 for a severe range BP, BP has since improved.    Pitocin infusing per plan of care. AROM by CNM at 1815. Cervical check at that time 6/80%/-1.  Contractions are irregular but increasing in frequency and strength. Intensity is moderate with palpation. Cheri monitor in place. Continuous EFM. Baby in Category I tracings suggesting good perfusion. PT denies pain- epidural in place. Repositioning with assistance.    Plan of care reviewed with PT and oralia Mistry. Encouraged questions. Provided reassurance and empathetic listening.

## 2022-02-04 NOTE — PROGRESS NOTES
"S: Alecia is having new pain in her right hip/low back. She was resting on her side with the peanut ball when she woke with the new pain. Feels like she \"pulled a muscle\" and feels some pelvic pressure \"like I have to pee\".    O:  Blood pressure (!) 160/92, pulse 108, temperature 98.4  F (36.9  C), temperature source Oral, resp. rate 16, last menstrual period 2021, SpO2 100 %, not currently breastfeeding.  General appearance: uncomfortable with contractions    CONTACTIONS: Contractions every 7-9 minutes.  Palpate: moderate  FETAL HEART TONES: baseline 120 with moderate FHR variability and    accelerations yes. Decelerations no.    ROM: clear fluid  PELVIC EXAM: deferred  Fetal Position:  cephalic  Bloody show: No  Pitocin- 14 mu/min.,  Antibiotics- none  Cervical ripening: N/A    ASSESSMENT:  ==============  IUP @ 38w6d active labor   Fetal Heart rate tracing category one  GBS- negative  Preeclampsia with SF     PLAN:  ===========  comfort measures prn   Pain medication continue epidural  Anticipate   Labor induction with Pitocin  reevaluate in 2-4 hours/PRN     Arsenio Moser CNM    "

## 2022-02-04 NOTE — PROGRESS NOTES
Patient arrived to [facility] unit via zoom cart at 0922,with belongings, accompanied by spouse/ significant other, with infant in arms. Received report from Nasreen JIMENES RN and checked bands. Unit and room orientation completd. Call light given and within arms reach; no concerns present at this time. Continue with plan of care.

## 2022-02-04 NOTE — PROGRESS NOTES
OB/GYN Postpartum Magnesium Check Note     S: Patient doing well at this time. Denies headache, blurry vision, SOB, N/V, RUQ or epigastric pain.    O:  /64   Pulse 94   Temp 97.9  F (36.6  C) (Oral)   Resp 16   LMP 2021   SpO2 92%   Breastfeeding Unknown     General: AAOx3, NAD  CV: RRR, no murmurs, rubs, or gallops  Resp: CTAB, no wheezes, rales, or rhonchi  Ext: Reflexes 1+ bilateral patellar and brachioradial, no clonus; 2+ prepatellar pitting edema on bilateral LE    UOP: Wells in place: 450cc since 0800: 0.42cc/kg/hr    A/P: Alecia GIBSON Pauser is a 27 year old  now POD#0 s/p PLTCS for arrest of dilation. Transfer of care from [facility] service. Intrapartum course complicated by development of Preeclampsia with Severe Features - now on magnesium for seizure prophylaxis. Currently doing well with no signs of toxicity. BPs currently normotensive.     Pre-eclampsia with Severe Features (BP)   - Based on sustained SR BP requiring IV antihypertensives.   - Now s/p IV labetalol 20mg x2 (1052, 1224 on 2/3/22)  - Mg 4g (1114 on ) > 2g/h at this time.   - UOP adequate  - No signs of Mg toxicity. Mg lvl prn.   - Q4h Mg check - next ~ 1730     POD#0 s/p PLTCS   - Hgb 11.0 > QBL 1880 (miso, TXA, hem) > 10.7 > 9 at 1000AM after surgery.   - Plan to keep Wells in until POD#1   - Lovenox to start POD#1.     Josseline Dejesus MD  OB/GYN PGY-2  22 5:16 PM

## 2022-02-04 NOTE — OP NOTE
[facility]   OPERATIVE NOTE:  SECTION      Surgery Date:  2022   Surgeon(s): Heather Marino MD  Assistants:  Annette Graf MD, PGY-2     Preoperative Diagnoses:  -  at 38w6d   - Pre-eclampsia with severe features  - Arrest of dilation  - Asthma      Postoperative diagnoses:  -  at 38w6d, now delivered  - Pre-eclampsia with severe features  - Arrest of dilation  - Asthma      Procedure performed: Primary low segment transverse  section via Pfannenstiel skin incision with double layer uterine closure     Anesthesia: Epidural, bolused  QBL (mL): 1880 mL  Fluid replacement: 900 mL crystalloid.   Specimens: Placenta (not sent), cord gases  Complications: PPH secondary to atony       Operative findings:   - Single, liveborn female infant at 0613 hours on 2022. No nuchal. Apgars of 8 and 9 at one and five minutes. Birth weight: 3280 g.  Fetal presentation: Vertex. Amniotic fluid: Clear.    - Arterial Cord pH 7.32 with base excess -2.9. Venous Cord pH 7.34 with base excess -3.1.    - Placenta intact with 3 vessel cord.     - Normal appearing uterus, fallopian tubes, ovaries.   - No recto-fascial adhesions. No intraabdominal adhesions. No abdominal wall adhesions.   - Good uterine tone.     Indication: Alecia Villaltar is a 27 year old  who was admitted at 38w6d by LMP c/w 6w6d ultrasound for IOL for gestational HTN. Her induction was started and she had severe range pressures >4h apart, meeting diagnostic criteria for pre-eclampsia with severe features. She had a prolonged labor course, making it to 6 cm. She underwent AROM and continued to have inadequate contractions despite uptitration of pitocin. She met criteria for arrest of dilation after 6 hours without change and recommendation for  delivery was made. The risks, benefits, and alternatives of  delivery were explained and the patient agreed to proceed. Pregnancy also  complicated by history of asthma.     Procedure details:  After obtaining informed consent, the patient was taken to the operating room. She received Ancef, azithromycin, and a bolus of her epidural prior to the skin incision. She was placed in the dorsal supine position with a leftward tilt and prepped and draped in the usual sterile fashion.     Following test of adequate spinal anesthesia, the abdomen was entered through a transverse skin incision. The skin incision was made sharply and carried through the subcutaneous tissue to the fascia. Fascia was incised in the midline and extended laterally with the Cruz scissors. The superior margin of the fascial incision was grasped with Kocher clamps and dissected from the underlying muscle sharp and blunt dissecton, which was then repeated at the lower margin of the fascial incision. The muscle was  in the midline.      The peritoneum was entered bluntly and the opening extended by digital dissection with care to avoid the bladder. A bladder blade was placed. The lower segment of the uterus was opened sharply in a transverse fashion and extended with digital pressure. The infant's head was elevated to the level of the hysterotomy and was delivered atraumatically. The cord was doubly clamped after 60 seconds and cut and the infant was handed off to the waiting NICU staff. A segment of the cord was cut and set aside for cord gases if needed.     The placenta was expressed. The uterus was exteriorized from the abdomen and cleared of all clots and debris. The uterus was massaged and was noted to have poor tone. Oxytocin was given through the running IV, buccal misoprostol, and TXA were given. The hysterotomy was repaired with 0-vicryl suture in a running locked fashion. The uterus continued to have moderate tone, so a dose of hemabate was given. A second layer of 0-vicryl was used to imbricate the incision and good hemostasis was achieved. The bladder flap was  inspected and found to be hemostatic. The posterior cul-de-sac was irrigated and the uterus was returned to the abdomen with improved tone noted.     The bilateral pericolic gutters were irrigated. The hysterotomy was again inspected and found to be hemostatic. The abdominal wall was examined and also found to be hemostatic. The fascia was closed with a running suture of 0-vicryl.  Subcutaneous tissue was irrigated. Areas that were not hemostatic were controlled with cautery. The subcutaneous tissue was reapproximated with 3-0 plain catgut. The skin was closed with 4-0 monocryl. The patient tolerated the procedure well and was taken to the recovery room in stable condition. All sponge, needle and instrument counts were correct x2. Dr. Marino was present for the entire procedure.       Annette Graf MD  OB/GYN Resident PGY-2  7:44 AM February 4, 2022       ATTESTATION:   I was scrubbed and present for the entire procedure. I agree with the above note which I have edited to reflect my findings.   Heather Marino MD

## 2022-02-04 NOTE — PROGRESS NOTES
Labor Progress Note     OB/Gyn team asked to consult for patient with arrest of dilation. Patient has been >6 cm, rupture, and on pitocin with inadequate contractions for 6 hours, cervix becoming swollen and actually 4 cm. Epidural in place. She is feeling tired after a long induction, but otherwise doing well. Currently on Mg for pre-eclampsia with SF due to sustained SR BP. No HA, vision changes, CP, SOB, RUQ pain.     O:  Vitals:    22 0224 22 0230 22 0232 22 0238   BP: 138/78 137/72 (!) 142/72 137/71   Pulse:       Resp: 20 18 20 20   Temp:  99.3  F (37.4  C)     TempSrc:  Oral     SpO2:  97% 98%      General: Uncomfortable with contractions.   SVE: Deferred, 4 cm per primary CNM team     FHT: Baseline 150, moderate variability, accelerations present, no decelerations  TOCO: 1-2 contractions in ten minutes    A/P: 27 year old  at 38w6d by LMP c/w 6w6d US here for IOL for pre-eclampsia with SF by SR BP. Pregnancy otherwise uncomplicated. Labor course has been protracted, now meeting criteria for arrest of dilation with >6h of inadequate contractions without cervical change after 6 cm.     We reviewed recommendation for  delivery at this time, which she is agreeable to. Risks and benefits discussed. Risks reviewed include bleeding, infection, injury to other tissues and organs, risk of blood transfusion, and remote risk of hysterectomy. Questions answered and consent signed. Ancef 2 g and azithromycin 500 mg for prophylaxis. Anesthesia consulted.     To OR in urgent fashion. Pitocin discontinued. Due to other acuity on floor will need to be second case. Will give dose of PO Tylenol at this time. Close monitoring of maternal temperature and fetal HR.       Annette Graf MD  OB/GYN Resident PGY-2  3:53 AM 2022        Physician Attestation   I, Heather Marino MD, personally examined and evaluated this patient.  I discussed the patient with the  resident/fellow and care team, and agree with the assessment and plan of care as documented in the note of 22.      I personally reviewed vital signs, medications, labs and exam and fetal tracing.    Key findings: 27 year old  at 38w6d with pre-eclampsia with severe features now with arrest of dilation. Recommend delivery by  section. Decision made at 0330.  Pregnancy complicated by excessive weight gain, asthma.   Fetal baseline increasing and maternal temp increasing but does not meet criteria for triple I at this time.   Category 2 fetal tracing due to periods of minimal variability but at 0341`had 10 min of moderate variability and accelerations. Will have NICU team present for delivery.  Plan azithromycin and ancef for antibiotic prophylaxis and will monitor closely for s/sx infection post partum.   Plan to repeat HELLP labs now. Will continue magnesium sulfate for 24h postpartum.   Due to a higher acuity situation on the labor floor, a different patient's  section was called after this patient's but is more urgent and will go first.    [facility]  DYSTOCIA ARREST HUDDLE       Choose one diagnosis below  Diagnosis of Dystocia/ Arrest:  Cervix is >=6cm  Membranes are ruptured (if possible), then No cervical change after at least 4 hours of adequate uterine activity (strong by palpation or MVUs greater than 200), OR at least 6 hours of oxytocin administration with inadequate uterine activity.  Discussed indication for  section. Reviewed risks, benefits, alternatives and recovery of  section. Risks include but are not limited to infection that may need antibiotics to treat, injury to other organs such as but not limited to bladder, ureters, intestines, injury to baby, risk of excessive blood loss, potential need for blood transfusion and potential need for hysterectomy. Patient consents to transfusion of blood products if indicated. Discussed risks of anesthesia and DVT.  Verbal and written consent obtained.     Heather Marino MD  Date of Service (when I saw the patient): 02/04/22

## 2022-02-05 LAB
HGB BLD-MCNC: 7.6 G/DL (ref 11.7–15.7)
MAGNESIUM SERPL-MCNC: 6.9 MG/DL (ref 1.8–2.6)

## 2022-02-05 PROCEDURE — 250N000011 HC RX IP 250 OP 636: Performed by: STUDENT IN AN ORGANIZED HEALTH CARE EDUCATION/TRAINING PROGRAM

## 2022-02-05 PROCEDURE — 36415 COLL VENOUS BLD VENIPUNCTURE: CPT | Performed by: STUDENT IN AN ORGANIZED HEALTH CARE EDUCATION/TRAINING PROGRAM

## 2022-02-05 PROCEDURE — 85018 HEMOGLOBIN: CPT | Performed by: STUDENT IN AN ORGANIZED HEALTH CARE EDUCATION/TRAINING PROGRAM

## 2022-02-05 PROCEDURE — 120N000002 HC R&B MED SURG/OB UMMC

## 2022-02-05 PROCEDURE — 250N000011 HC RX IP 250 OP 636

## 2022-02-05 PROCEDURE — 250N000013 HC RX MED GY IP 250 OP 250 PS 637: Performed by: STUDENT IN AN ORGANIZED HEALTH CARE EDUCATION/TRAINING PROGRAM

## 2022-02-05 PROCEDURE — 258N000003 HC RX IP 258 OP 636: Performed by: ADVANCED PRACTICE MIDWIFE

## 2022-02-05 PROCEDURE — 258N000003 HC RX IP 258 OP 636

## 2022-02-05 RX ORDER — DIPHENHYDRAMINE HYDROCHLORIDE 50 MG/ML
50 INJECTION INTRAMUSCULAR; INTRAVENOUS
Status: DISCONTINUED | OUTPATIENT
Start: 2022-02-05 | End: 2022-02-07 | Stop reason: HOSPADM

## 2022-02-05 RX ORDER — METHYLPREDNISOLONE SODIUM SUCCINATE 125 MG/2ML
125 INJECTION, POWDER, LYOPHILIZED, FOR SOLUTION INTRAMUSCULAR; INTRAVENOUS
Status: DISCONTINUED | OUTPATIENT
Start: 2022-02-05 | End: 2022-02-07 | Stop reason: HOSPADM

## 2022-02-05 RX ADMIN — OXYCODONE HYDROCHLORIDE 5 MG: 5 TABLET ORAL at 03:51

## 2022-02-05 RX ADMIN — DOCUSATE SODIUM AND SENNOSIDES 2 TABLET: 8.6; 5 TABLET, FILM COATED ORAL at 09:19

## 2022-02-05 RX ADMIN — OXYCODONE HYDROCHLORIDE 5 MG: 5 TABLET ORAL at 21:37

## 2022-02-05 RX ADMIN — OXYCODONE HYDROCHLORIDE 5 MG: 5 TABLET ORAL at 14:54

## 2022-02-05 RX ADMIN — ENOXAPARIN SODIUM 40 MG: 40 INJECTION SUBCUTANEOUS at 14:49

## 2022-02-05 RX ADMIN — ACETAMINOPHEN 975 MG: 325 TABLET, FILM COATED ORAL at 13:25

## 2022-02-05 RX ADMIN — KETOROLAC TROMETHAMINE 30 MG: 30 INJECTION, SOLUTION INTRAMUSCULAR at 03:52

## 2022-02-05 RX ADMIN — IRON SUCROSE 300 MG: 20 INJECTION, SOLUTION INTRAVENOUS at 15:52

## 2022-02-05 RX ADMIN — ACETAMINOPHEN 975 MG: 325 TABLET, FILM COATED ORAL at 19:14

## 2022-02-05 RX ADMIN — ACETAMINOPHEN 975 MG: 325 TABLET, FILM COATED ORAL at 06:40

## 2022-02-05 RX ADMIN — SODIUM CHLORIDE, POTASSIUM CHLORIDE, SODIUM LACTATE AND CALCIUM CHLORIDE 75 ML/HR: 600; 310; 30; 20 INJECTION, SOLUTION INTRAVENOUS at 02:30

## 2022-02-05 RX ADMIN — DOCUSATE SODIUM AND SENNOSIDES 2 TABLET: 8.6; 5 TABLET, FILM COATED ORAL at 19:14

## 2022-02-05 RX ADMIN — IBUPROFEN 800 MG: 800 TABLET, FILM COATED ORAL at 10:01

## 2022-02-05 RX ADMIN — IBUPROFEN 800 MG: 800 TABLET, FILM COATED ORAL at 16:54

## 2022-02-05 RX ADMIN — OXYCODONE HYDROCHLORIDE 5 MG: 5 TABLET ORAL at 09:19

## 2022-02-05 NOTE — PROGRESS NOTES
OB/GYN Postpartum Magnesium Check/Daily Progress Note Note     S: Patient doing well at this time. Reports incisional pain that does improve with PO medications. Denies dizziness, nausea, vomiting. Bleeding similar to light menses. Not yet passing flatus. Hassan remains in place. Denies headache, blurry vision, SOB, N/V, RUQ or epigastric pain.    O:  /73   Pulse 90   Temp 98.6  F (37  C) (Oral)   Resp 18   LMP 2021   SpO2 95%   Breastfeeding Unknown     General: AAOx3, NAD  CV: RRR, no murmurs  Resp: CTAB, no wheezes, rales, or rhonchi  Abd: Incision covered with dressing, no drainage. Mildly distended. Appropriately ttp. Fundus at the umbilicus.   Ext: Reflexes 1+ brachioradial, no clonus; 2+ prepatellar pitting edema on bilateral LE    UOP: Hassan in place: 350cc since 0000. 0.79cc/kg/hr    A/P: Alecia ARTHUR Pauser is a 27 year old  now POD#1 s/p PLTCS for arrest of dilation. Transfer of care from [facility] service. Intrapartum course complicated by development of Preeclampsia with Severe Features - now on magnesium for seizure prophylaxis. Currently doing well with no signs of toxicity. BPs currently normotensive.     Pre-eclampsia with Severe Features (BP)   - Based on sustained SR BP requiring IV antihypertensives.   - Now s/p IV labetalol 20mg x2 (1052, 1224 on 2/3/22)  - Mg 4g (1114 on ) > 2g/h at this time. Off at 0613.  - UOP adequate  - No signs of Mg toxicity. Mg lvl prn.     POD#1 s/p PLTCS   - Hgb 11.0 > QBL 1880 (miso, TXA, hem) > 10.7 > 9> 7.8. Asymptomatic. Followup AM hgb. Consider IV iron  - Remove hassan this am  - Lovenox to start this morning pending AM hgb  - Continue current PO pain regimen  - Senna daily  - Declines contraception    Yuki Ruby MD  OB/GYN PGY-3  22 3:48 AM

## 2022-02-05 NOTE — PLAN OF CARE
Data: Vital signs within normal limits. Postpartum checks within normal limits - see flow record.  Patient is currently on Magnesium.  Patient denies headache, visual changes (states needs to wear her glasses more often), SOB or epigastric pain.  Patient is working on oral hydration - see input and output.   Patient eating and drinking normally. Patient has hassan in until later this morning when Magnesium is turned off. No apparent signs of infection. Incision healing well. Patient performing some self cares and is able to care for infant with assistance from SO.  Patient is breastfeeding well independently.  Patient needs a little assistance and encouragement with Left side, but is doing very well overall with breastfeeding.   Action: Patient medicated during the shift for pain. See MAR. Patient reassessed within 1 hour after each medication and pain was improved - patient stated she was comfortable. Patient education done about breastfeeding and self care. See flow record.  Response: Positive attachment behaviors observed with infant. Support persons was present and attentive to needs of patient and infant.   Plan: Anticipate discharge.

## 2022-02-05 NOTE — PLAN OF CARE
VSS. -120s/60s. Initially tachycardic in 100s now HR in 90s. Denies headache, vision changes, RUQ pain, and SOB. Reflexes +2, no clonus. Fundus midline, firm, and U/1. Scant to light lochia. Incision covered, dressing C/D/I. Pain adequately managed with tylenol, toradol, and oxycodone. Ambulating with stand-by assist, tolerating regular diet. Adequate urine output in hassan catheter. Breastfeeding independently  on right side, assist with positioning and latching on left side. Bonding well with . Continue with plan of care.

## 2022-02-05 NOTE — PROGRESS NOTES
OB/GYN Postpartum Magnesium Check Note     S: Patient doing well at this time. Denies headache, blurry vision, SOB, N/V, RUQ or epigastric pain.     O:  /65   Pulse 91   Temp 98.7  F (37.1  C) (Oral)   Resp 16   LMP 2021   SpO2 95%   Breastfeeding Unknown     General: AAOx3, NAD  CV: RRR, no murmurs, rubs, or gallops  Resp: CTAB, no wheezes, rales, or rhonchi  Abd: mild ttp in RUQ  Ext: Reflexes 1+ bilateral patellar no clonus; 2+ prepatellar pitting edema on bilateral LE    UOP: Hassan in place: 300cc since 1200> .24cc/kg/hr    A/P: Alecia GIBSON Pauser is a 27 year old  now POD#0 s/p PLTCS for arrest of dilation. Transfer of care from [facility] service. Intrapartum course complicated by development of Preeclampsia with Severe Features - now on magnesium for seizure prophylaxis. Currently doing well with no signs of toxicity. BPs currently normotensive. Does have mild RUQ ttp on exam. Will obtain HELLP labs.     Pre-eclampsia with Severe Features (BP)   - Based on sustained SR BP requiring IV antihypertensives.   - Now s/p IV labetalol 20mg x2 (1052, 1224 on 2/3/22)  - Mg 4g (1114 on ) > 2g/h at this time.   - UOP intermittently adequate. Uncharted urine in hassan. Will continue to monitor closely.   - No signs of Mg toxicity. Mg lvl prn.   - Q4h Mg check - next at 0245    POD#0 s/p PLTCS   - Hgb 11.0 > QBL 1880 (miso, TXA, hem) > 10.7 > 9 at 1000AM after surgery.   - Plan to keep Hassan in until POD#1   - Lovenox to start POD#1.     Yuki Ruby MD  OB/GYN PGY-3  22 10:45 PM

## 2022-02-05 NOTE — PLAN OF CARE
[facility]  [address]  [place] [address]  Phone: [phone]  Fax: [phone]                                    Kelsey Benito   MRN: [id number]    Department: [facility] Department   Date of Visit: 12/10/2020           After Visit Summary Signature Page    I have received my discharge instructions, and my questions have been answered. I have discussed any challenges I see with this plan with the nurse or doctor.    ..........................................................................................................................................  Patient/Patient Representative Signature      ..........................................................................................................................................  Patient Representative Print Name and Relationship to Patient    ..................................................               ................................................  Date                                   Time    ..........................................................................................................................................  Reviewed by Signature/Title    ...................................................              ..............................................  Date                                               Time          22EPIC Rev 08/18        VSS. Denies headache, vision changes, RUQ pain, and SOB. Moderate dependent and mild generalized edema. Reflexes 2+, no clonus. Fundus midline, firm, and U/1. Scant lochia. Incision covered; dressing C/D/I. Pain adequately managed with tylenol, ibuprofen, and oxycodone. Ambulating independently, tolerating regular diet, and voiding without difficulty. Breastfeeding independently. Supplementation started so encouraged pumping after each feeding. Bonding well with . Continue with plan of care.

## 2022-02-06 LAB
CREAT SERPL-MCNC: 0.77 MG/DL (ref 0.52–1.04)
GFR SERPL CREATININE-BSD FRML MDRD: >90 ML/MIN/1.73M2
PLATELET # BLD AUTO: 196 10E3/UL (ref 150–450)

## 2022-02-06 PROCEDURE — 85049 AUTOMATED PLATELET COUNT: CPT | Performed by: OBSTETRICS & GYNECOLOGY

## 2022-02-06 PROCEDURE — 120N000002 HC R&B MED SURG/OB UMMC

## 2022-02-06 PROCEDURE — 36415 COLL VENOUS BLD VENIPUNCTURE: CPT | Performed by: OBSTETRICS & GYNECOLOGY

## 2022-02-06 PROCEDURE — 250N000011 HC RX IP 250 OP 636: Performed by: STUDENT IN AN ORGANIZED HEALTH CARE EDUCATION/TRAINING PROGRAM

## 2022-02-06 PROCEDURE — 250N000011 HC RX IP 250 OP 636: Performed by: OBSTETRICS & GYNECOLOGY

## 2022-02-06 PROCEDURE — 258N000003 HC RX IP 258 OP 636: Performed by: OBSTETRICS & GYNECOLOGY

## 2022-02-06 PROCEDURE — 82565 ASSAY OF CREATININE: CPT | Performed by: OBSTETRICS & GYNECOLOGY

## 2022-02-06 PROCEDURE — 250N000013 HC RX MED GY IP 250 OP 250 PS 637: Performed by: STUDENT IN AN ORGANIZED HEALTH CARE EDUCATION/TRAINING PROGRAM

## 2022-02-06 RX ORDER — ACETAMINOPHEN 325 MG/1
650 TABLET ORAL EVERY 6 HOURS PRN
Qty: 100 TABLET | Refills: 0 | Status: SHIPPED | OUTPATIENT
Start: 2022-02-06 | End: 2022-08-17

## 2022-02-06 RX ORDER — HYDROCHLOROTHIAZIDE 25 MG/1
25 TABLET ORAL DAILY
Status: DISCONTINUED | OUTPATIENT
Start: 2022-02-06 | End: 2022-02-06

## 2022-02-06 RX ORDER — FUROSEMIDE 20 MG
20 TABLET ORAL DAILY
Status: DISCONTINUED | OUTPATIENT
Start: 2022-02-06 | End: 2022-02-07 | Stop reason: HOSPADM

## 2022-02-06 RX ORDER — IBUPROFEN 600 MG/1
600 TABLET, FILM COATED ORAL EVERY 6 HOURS PRN
Qty: 60 TABLET | Refills: 0 | Status: SHIPPED | OUTPATIENT
Start: 2022-02-06

## 2022-02-06 RX ORDER — LIDOCAINE 4 G/G
1 PATCH TOPICAL
Status: DISCONTINUED | OUTPATIENT
Start: 2022-02-06 | End: 2022-02-07 | Stop reason: HOSPADM

## 2022-02-06 RX ORDER — AMOXICILLIN 250 MG
1 CAPSULE ORAL DAILY
Qty: 100 TABLET | Refills: 0 | Status: SHIPPED | OUTPATIENT
Start: 2022-02-06 | End: 2022-08-17

## 2022-02-06 RX ORDER — OXYCODONE HYDROCHLORIDE 5 MG/1
5 TABLET ORAL EVERY 4 HOURS PRN
Qty: 10 TABLET | Refills: 0 | Status: SHIPPED | OUTPATIENT
Start: 2022-02-06 | End: 2022-08-17

## 2022-02-06 RX ADMIN — ACETAMINOPHEN 975 MG: 325 TABLET, FILM COATED ORAL at 19:23

## 2022-02-06 RX ADMIN — OXYCODONE HYDROCHLORIDE 5 MG: 5 TABLET ORAL at 00:37

## 2022-02-06 RX ADMIN — ACETAMINOPHEN 975 MG: 325 TABLET, FILM COATED ORAL at 07:36

## 2022-02-06 RX ADMIN — IRON SUCROSE 300 MG: 20 INJECTION, SOLUTION INTRAVENOUS at 15:33

## 2022-02-06 RX ADMIN — FUROSEMIDE 20 MG: 20 TABLET ORAL at 07:55

## 2022-02-06 RX ADMIN — OXYCODONE HYDROCHLORIDE 5 MG: 5 TABLET ORAL at 09:15

## 2022-02-06 RX ADMIN — LIDOCAINE 1 PATCH: 246 PATCH TOPICAL at 11:15

## 2022-02-06 RX ADMIN — OXYCODONE HYDROCHLORIDE 5 MG: 5 TABLET ORAL at 19:02

## 2022-02-06 RX ADMIN — IBUPROFEN 800 MG: 800 TABLET, FILM COATED ORAL at 12:05

## 2022-02-06 RX ADMIN — IBUPROFEN 800 MG: 800 TABLET, FILM COATED ORAL at 00:35

## 2022-02-06 RX ADMIN — IBUPROFEN 800 MG: 800 TABLET, FILM COATED ORAL at 18:28

## 2022-02-06 RX ADMIN — DOCUSATE SODIUM AND SENNOSIDES 2 TABLET: 8.6; 5 TABLET, FILM COATED ORAL at 07:36

## 2022-02-06 RX ADMIN — ENOXAPARIN SODIUM 40 MG: 40 INJECTION SUBCUTANEOUS at 15:06

## 2022-02-06 RX ADMIN — OXYCODONE HYDROCHLORIDE 5 MG: 5 TABLET ORAL at 22:55

## 2022-02-06 RX ADMIN — ACETAMINOPHEN 975 MG: 325 TABLET, FILM COATED ORAL at 01:34

## 2022-02-06 RX ADMIN — IBUPROFEN 800 MG: 800 TABLET, FILM COATED ORAL at 05:51

## 2022-02-06 RX ADMIN — DOCUSATE SODIUM AND SENNOSIDES 2 TABLET: 8.6; 5 TABLET, FILM COATED ORAL at 20:33

## 2022-02-06 RX ADMIN — OXYCODONE HYDROCHLORIDE 5 MG: 5 TABLET ORAL at 14:53

## 2022-02-06 RX ADMIN — ACETAMINOPHEN 975 MG: 325 TABLET, FILM COATED ORAL at 13:39

## 2022-02-06 NOTE — PROGRESS NOTES
Midwifery Courtesy Visit   Alecia is doing better today, she feels more like herself again after having showered and changed. She is reflecting about her experience and understands that the C section was necessary but feels a loss of control. She is tearful when talking about it. We discussed potential follow up with behavioral health and she is open to it. Will follow up with the CNM team for a postpartum visit so she can revisit her labor experience.        MIKY Wolff, GIOVANNI, APRN CNM

## 2022-02-06 NOTE — PLAN OF CARE
VSS. Denies s/s preE. Postpartum assessment WNL. C/o some incisional pain. Well managed with tylenol, ibuprofen, and oxycodone. Breastfeeding more independently. Parents taught how to provide supplementation. Encouraged pt to complete educational videos. Pt plans to call insurance regarding coverage for breast pump and blood pressure cuff. Tolerated IV iron. EDS of 7. Significant other present and attentive.

## 2022-02-06 NOTE — PROGRESS NOTES
OB/GYN Postpartum Note     S:   Patient doing well at this time. Pain controlled. Does have incisional pain. Denies dizziness, nausea, vomiting. Tolerating PO. Bleeding similar to light menses. Passing flatus, no BM. Voiding. Swelling is worse today. Denies headache, blurry vision, SOB, N/V, RUQ or epigastric pain.    O:  Patient Vitals for the past 24 hrs:   BP Temp Temp src Pulse Resp SpO2 Weight   22 0553 -- -- -- -- -- -- 112.8 kg (248 lb 9.6 oz)   22 0546 126/71 98.8  F (37.1  C) Oral 91 16 -- --   22 0121 133/67 98.4  F (36.9  C) Oral 103 16 -- --   22 2136 (!) 142/69 98.1  F (36.7  C) Oral 102 14 -- --   22 1752 122/74 98.6  F (37  C) Oral 99 14 -- --   22 1654 115/68 98.1  F (36.7  C) -- 93 16 -- --   22 1640 111/74 98.6  F (37  C) Oral 93 14 -- --   22 1625 (!) 130/93 98.3  F (36.8  C) Oral 102 16 -- --   22 1610 127/77 98.8  F (37.1  C) Oral 99 16 -- --   22 1555 115/68 98.3  F (36.8  C) Oral 98 16 -- --   22 1506 127/75 98.1  F (36.7  C) Oral 100 18 96 % --   22 1124 124/73 98.2  F (36.8  C) Oral 93 18 -- --   22 0919 -- -- -- -- -- -- 115.4 kg (254 lb 8 oz)     General: AAOx3, NAD  CV: well perfused  Resp: NWOB  Abd: Not examined, breastfeeding   Ext: 2+ edema b/l     A/P:   Alecia GIBSON Pauser is a 27 year old  now POD#2 s/p PLTCS for arrest of dilation. Transfer of care from [facility] service. Intrapartum course complicated by development of Preeclampsia with Severe Features - now s/p magnesium. Meeting postpartum goals.    Pre-eclampsia with Severe Features (BP)   - Based on sustained SR BP requiring IV antihypertensives.   - Now s/p IV labetalol 20mg x2 (1052, 1224 on 2/3/22)  - No long acting BP meds  - D#1 Lasix 20 for edema  - s/p Mag    #PPH  #ABLA  - Hgb 11.0 > QBL 1880 (miso, TXA, hem) > 10.7 > 9> 7.8 > 7.6 > IV Fe x1, second dose toda.   - Asymptomatic  - Discharge with PO Fe    POD#2 s/p PLTCS   - Voiding  - Lovenox    - Continue current PO pain regimen  - Senna daily  - Declines contraception    Staffed with Dr. Kelly.    Saeed Perdomo MD MPH  OB/Gyn PGY-3  02/06/22 8:10 AM

## 2022-02-07 VITALS
DIASTOLIC BLOOD PRESSURE: 88 MMHG | HEART RATE: 96 BPM | BODY MASS INDEX: 43.55 KG/M2 | SYSTOLIC BLOOD PRESSURE: 138 MMHG | RESPIRATION RATE: 18 BRPM | OXYGEN SATURATION: 97 % | WEIGHT: 253.7 LBS | TEMPERATURE: 97.8 F

## 2022-02-07 PROCEDURE — 250N000013 HC RX MED GY IP 250 OP 250 PS 637: Performed by: STUDENT IN AN ORGANIZED HEALTH CARE EDUCATION/TRAINING PROGRAM

## 2022-02-07 PROCEDURE — 250N000011 HC RX IP 250 OP 636: Performed by: OBSTETRICS & GYNECOLOGY

## 2022-02-07 PROCEDURE — 258N000003 HC RX IP 258 OP 636: Performed by: OBSTETRICS & GYNECOLOGY

## 2022-02-07 RX ORDER — HYDROCHLOROTHIAZIDE 12.5 MG/1
25 TABLET ORAL DAILY
Qty: 7 TABLET | Refills: 0 | Status: SHIPPED | OUTPATIENT
Start: 2022-02-07 | End: 2022-08-17

## 2022-02-07 RX ADMIN — ACETAMINOPHEN 975 MG: 325 TABLET, FILM COATED ORAL at 13:50

## 2022-02-07 RX ADMIN — ACETAMINOPHEN 975 MG: 325 TABLET, FILM COATED ORAL at 01:30

## 2022-02-07 RX ADMIN — IBUPROFEN 800 MG: 800 TABLET, FILM COATED ORAL at 07:23

## 2022-02-07 RX ADMIN — OXYCODONE HYDROCHLORIDE 5 MG: 5 TABLET ORAL at 03:26

## 2022-02-07 RX ADMIN — ACETAMINOPHEN 975 MG: 325 TABLET, FILM COATED ORAL at 07:22

## 2022-02-07 RX ADMIN — DOCUSATE SODIUM AND SENNOSIDES 2 TABLET: 8.6; 5 TABLET, FILM COATED ORAL at 09:03

## 2022-02-07 RX ADMIN — FUROSEMIDE 20 MG: 20 TABLET ORAL at 09:03

## 2022-02-07 RX ADMIN — IBUPROFEN 800 MG: 800 TABLET, FILM COATED ORAL at 13:50

## 2022-02-07 RX ADMIN — OXYCODONE HYDROCHLORIDE 5 MG: 5 TABLET ORAL at 09:03

## 2022-02-07 RX ADMIN — IBUPROFEN 800 MG: 800 TABLET, FILM COATED ORAL at 01:04

## 2022-02-07 RX ADMIN — IRON SUCROSE 300 MG: 20 INJECTION, SOLUTION INTRAVENOUS at 11:03

## 2022-02-07 RX ADMIN — LIDOCAINE 1 PATCH: 246 PATCH TOPICAL at 09:06

## 2022-02-07 NOTE — PROGRESS NOTES
OB/GYN Postpartum Note     S:   Patient doing well at this time. Pain controlled. Does have incisional pain and feeling sore, but every day is better. Denies dizziness, nausea, vomiting. Tolerating PO. Bleeding similar to light menses. Passing flatus, had a BM yesterday. Voiding. Swelling is a bit better after dose of IV Lasix yesterday, but still noticeable. Denies headache, blurry vision, SOB, N/V, RUQ or epigastric pain.    O:  Patient Vitals for the past 24 hrs:   BP Temp Temp src Pulse Resp SpO2 Weight   22 0634 -- -- -- -- -- -- 115.1 kg (253 lb 11.2 oz)   22 0413 134/87 98.1  F (36.7  C) Oral 97 18 97 % --   22 0030 119/75 98.2  F (36.8  C) Oral 96 16 -- --   22 2030 125/80 98.4  F (36.9  C) Oral 96 18 -- --   22 1630 138/83 -- -- 100 20 -- --   22 1615 131/82 -- -- 96 -- -- --   22 1600 128/78 98.3  F (36.8  C) -- 101 20 -- --   22 1545 134/76 98.1  F (36.7  C) Oral 103 18 -- --   22 1530 132/73 98.2  F (36.8  C) Oral 102 18 -- --   22 1448 (!) 149/83 -- -- 104 18 -- --   22 1205 (!) 142/76 -- -- 109 18 -- --     General: AAOx3, NAD  CV: Well perfused  Resp: NWOB  Abd: Soft, appropriately tender, non-distended. Incision w/ steri strips in place, no erythema or drainage  Ext: 2+ edema b/l     A/P:   Alecia ARTHUR Pauser is a 27 year old  now POD#3 s/p PLTCS for arrest of dilation. Transfer of care from [facility] service. Intrapartum course complicated by development of Preeclampsia with Severe Features - now s/p magnesium. Meeting postpartum goals.    Pre-eclampsia with Severe Features (BP)   - Based on sustained SR BP requiring IV antihypertensives.   - Now s/p IV labetalol 20mg x2 (1052, 1224 on 2/3/22)  - No long acting BP meds  - s/p PO Lasix 20 for edema > will start 7d course of HCTZ for treatment of edema   - s/p Mag    #PPH  #ABLA  - Hgb 11.0 > QBL 1880 (miso, TXA, hem) > 10.7 > 9> 7.8 > 7.6 > IV Fe x2, third dose today prior to discharge.    - Asymptomatic  - Discharge with PO Fe    POD#3 s/p PLTCS   - Voiding  - Lovenox   - Continue current PO pain regimen  - Senna daily  - Declines contraception    Staffed with Dr. Charles.    Annette Graf MD  OB/GYN Resident PGY-2  8:05 AM February 7, 2022       Attestation:   This patient was seen and evaluated by me, separately from the house staff team. I have reviewed the note/plan above and agree.     Doing well and ready to discharge today. Very edematous so will do course of hydrochlorothiazide to help mobilize fluid. Discharge instructions reviewed and questions answered. Plan to see CNM at 6 wk pp visit and discussed acute blood loss anemia and oral Fe, plan recheck hgb then. Questions answered. Taking oxycodone and small script was done.    Jennifer Charles MD

## 2022-02-07 NOTE — PROGRESS NOTES
[id number] phone update to Dr. Ruby regarding the following; patient's weight this morning noted to be increased from 112.7kg yesterday to 115kg today. Patient exam notable for edema, lung sounds clear, intake and output appropriate overnight. No further orders from Dr. Ruby at this time.

## 2022-02-07 NOTE — DISCHARGE INSTRUCTIONS
Activity       Ask family and friends for help when you need it.    Do not place anything in your vagina for 6 weeks.    You are not restricted on other activities, but take it easy for a few weeks to allow your body to recover from delivery.  You are able to do any activities you feel up to that point.    No driving until you have stopped taking your pain medications (usually two weeks after delivery).     Call your health care provider if you have any of these symptoms:       A fever above 100.4 F (38 C) with or without chills when placing a thermometer under your tongue.    You soak a sanitary pad with blood within 1 hour, or you see blood clots larger than a golf ball.    Bleeding that lasts more than 6 weeks.    Vaginal discharge that smells bad.    Severe pain, cramping or tenderness in your lower belly area.    A need to urinate more frequently (use the toilet more often), more urgently (use the toilet very quickly), or it burns when you urinate.    Nausea and vomiting.    Redness, swelling or pain around a vein in your leg.    Problems breastfeeding or a red or painful area on your breast.    Chest pain and cough or are gasping for air.    Problems coping with sadness, anxiety, or depression.  If you have any concerns about hurting yourself or the baby, call your provider immediately.     You have questions or concerns after you return home.     Keep your hands clean:  Always wash your hands before touching your perineal area and stitches.  This helps reduce your risk of infection.  If your hands aren't dirty, you may use an alcohol hand-rub to clean your hands. Keep your nails clean and short.           Section ()  A  section () is the surgical delivery of a baby through an incision in the mother s belly. A  may be planned and scheduled. But, in many cases, a  is unexpected. In any case, it is done to make sure that you and your baby have the safest  birth.    Before the procedure  You will be given antibiotics before the . This is to reduce any risk of infection. Most C-sections are completed in less than 1 hour. During the birth, your healthcare team is with you, ready to take care of you and your . Your partner may also be with you for the birth.   During the procedure  Surgery will begin shortly after you receive anesthesia. You will receive either regional or general anesthesia. Incisions are made in both your skin and then your uterus. Your skin and uterine incisions may differ. Be sure they are noted in your health records. Here is how they may vary:     The skin incision. This is usually transverse (side to side). It is located at the pubic hairline. A vertical (up and down) incision may be used if you ve had this incision before or if the  needs to be done quickly.    The uterine incision. This is almost always transverse. A transverse incision heals very well. This may allow for a future vaginal birth (). In certain cases, a vertical uterine incision may be made.   Once the incisions are made, the healthcare provider presses on the top of the uterus and guides the baby through the incision. The cord will be clamped and cut. Then the placenta is lifted out through the incision.      Possible sites for a transverse skin incision or a vertical skin incision.     After the procedure  After your baby s birth, the uterine incision is closed with stitches. Your skin incision will be closed with surgical staples or stitches and a dressing will be applied. Your healthcare provider will press on your uterus. This helps expel blood clots through the vagina. You may be given medicines to help shrink your uterus and decrease bleeding.   Your baby's care after birth  While your surgery is completed, your baby will be placed in an infant warmer. Gentle suction will be used to help remove excess fluid from your baby s mouth and airways.  Then the APGAR score will be done. This rates a baby s appearance (skin color showing healthy blood flow), pulse (heart rate), grimace (muscle reflex), activity (muscle tone), and respiration. Your baby will be wrapped in a blanket and brought to you. Now, for the first time, you ll see your .   Risks of a   As with any surgery, a  has risks. Your healthcare provider will discuss the risks with you. They may include:     Bleeding    Infection    Injury to nearby organs    Blood clots in the legs, pelvis, or lungs    Reaction to anesthesia  [employer] last reviewed this educational content on 2020-2021 [employer]. All rights reserved. This information is not intended as a substitute for professional medical care. Always follow your healthcare professional's instructions.

## 2022-02-07 NOTE — PLAN OF CARE
Alecia ortizies HA, vision changes, SOB. Highest BP for today was 149/83. See flow record for rest of vitals. Second IV Iron infusion given today. Incision pain controlled with IBU, Tyl, and PRN oxycodone. Using lidocane patch on Left side of abdomen. Alecia showered today. Expressed feeling sad and overwhelmed when recalling events from labor and delivery.

## 2022-02-07 NOTE — PROVIDER NOTIFICATION
02/07/22 1115   Provider Notification   Provider Name/Title Oleg DHILLON   Method of Notification Electronic Page   Request Evaluate-Remote   Notification Reason Status Update  (PIV site symptomatic after 1 min of IV Iron. PIV removed,)     Pt stated she was a difficult stick and stated she was nervous that another vein wouldn't be found for a PIV. Does she need to get a new PIV for the third dose of IV Iron or is she okay to not get last dose. Thanks.

## 2022-02-07 NOTE — PLAN OF CARE
All discharge paperwork and medications reviewed with patient, all questions answered and education completed. BP cuff and breast pump given to pt, reviewed with pt how often to check BP and signs/symptoms to be aware of. Pt D/Pj unit via wheelchair around 1440.

## 2022-02-07 NOTE — PROGRESS NOTES
Patient vital signs stable overnight, pain controlled with tylenol, ibuprofen and oxycodone. Patient preeclampsia exam significant for edema, especially in her hands and feet, lung sounds clear in all fields. Patient encouraged to pump after feedings overnight, but also attempting to try and get some sleep as well. Patient excited as she noted new colostrum drainage from her right breast which is new for her. Patient is attempting breastfeeding with help of RN, but baby very uninterested in latching to nipple. Patient and her significant other becoming quite proficient with then finger feeding donor breast milk via SNS. Plan for lactation to visit patient later today to help solidify long term breast feeding plan as patient is very motivated to do so, but understands the need for SNS due to baby's jaundice level.

## 2022-02-07 NOTE — PLAN OF CARE
VSS, postpartum assessment WNL. Denies H/A, vision changes or any Pre-E symptoms, +2/+3 edema in hands and BLE. BP slightly elevated.  +2 reflexes, no clonus. Incision CDI steri strips intact and SHAGUFTA, no signs on infection- abdominal binder in place.  Lidocaine patch placed on R and L side of incision.  Pain managed with Ibuprofen, Tylenol and Oxycodone.  Ambulating in room independently- breastfeeding with some assistance.  Pt tearful today, but overall excited to go home with baby, just feeling emotional.  Lost PIV access, unable to administered last dose of IV iron, waiting to head back from OB regarding plan.  Pt pumping in between feedings,  at bedside and supportive, both bonding well with infant.  Pt is able to make needs known, call light within reach- continue with POC.    60 yo M hx of MM (not in treatment) and HTN who presented for uncontrollable pain, hematology consulted for hx of MM.    #History of IgG Kappa Multiple Myeloma  - received 3 cycles RVD in 2018, followed by Pomalyst as patient had a severe skin reaction to Revlimid  - obtain SPEP, UPEP, serum and urine immunofixation, urine FLC  - Serum FLC ratio 432  - IGG >8000  - B2 microglobulin 6.6  - LDH pending  - MRIs showing widespread myelomatous changes, no large areas of epidural tumor extension.  multiple pathologic fractures most pronounced at T2 level.  L4 compression fracture noted again as seen on CT Pelvis end of February.   - please consult rad onc/discuss with neurosurgery if anything can be done regarding findings.  - check hepatitis panel, HIV, skeletal survey  - BMBx performed on 3/31, will f/u results and likely start treatment inpatient when results are final.  - will plan on starting Velcade/Dexamethasone on 4/1.    Kateryna Tobias DO  Hematology/Oncology Fellow, PGY5  Pager: [phone]

## 2022-02-15 ENCOUNTER — HOSPITAL ENCOUNTER (OUTPATIENT)
Facility: CLINIC | Age: 27
End: 2022-02-15
Payer: COMMERCIAL

## 2022-02-16 ENCOUNTER — MEDICAL CORRESPONDENCE (OUTPATIENT)
Dept: HEALTH INFORMATION MANAGEMENT | Facility: CLINIC | Age: 27
End: 2022-02-16
Payer: COMMERCIAL

## 2022-03-02 ENCOUNTER — NURSE TRIAGE (OUTPATIENT)
Dept: NURSING | Facility: CLINIC | Age: 27
End: 2022-03-02
Payer: COMMERCIAL

## 2022-03-03 NOTE — TELEPHONE ENCOUNTER
Patient was eating dinner tonight and felt a large blood clot started to come out her vagina.  Patient went to the bathroom had a large clot and filled the toilet with blood and had a pad soaked with blood.  Patient states her bleeding has gone down, just some on her underwear and on the toilet paper.  Patient denies fever or abdominal pain.  Patient states she does have a foul smell that has been going on for the past few weeks.    Patient denies weakness or dizziness.    Patient delivered via C/S on 2/4/2022 after a 3 day failed induction.    Paged on call [facility] Kelsey Farfan @ 2018  Kelsey Farfan called back @ 2022    Reassured patient isnt having a fever or abdominal pain.  If patient soaks a large maxi in an hour then patient should be seen in the ED.  If not patient should make a follow up appointment in the clinic this week or next week.    Called patient explained the advise Kelsey Farfan gave.  Reassured patient.  Stated if patient soaks a maxi pad in a hour then patient should go to ED.  Patient should also have a follow up appointment in the clinic this week or next week.    Made appointment through scheduling.    Kelsey Perez RN   03/02/22 8:37 PM  [facility] Nurse Advisor            Reason for Disposition    Foul smelling vaginal discharge (i.e., lochia)    Additional Information    Negative: Shock suspected (e.g., cold/pale/clammy skin, too weak to stand, low BP, rapid pulse)    Negative: Difficult to awaken or acting confused (e.g., disoriented, slurred speech)    Negative: Passed out (i.e., lost consciousness, collapsed and was not responding)    Negative: Sounds like a life-threatening emergency to the triager    Negative: SEVERE dizziness (e.g., unable to stand, requires support to walk, feels like passing out now)    Negative: [1] SEVERE abdominal pain AND [2] present > 1 hour    Negative: Fever > 100.4 F (38.0 C)    Negative: SEVERE vaginal bleeding (e.g., soaking 2 pads per hour  and present 2 or more hours; large blood clots)    Negative: Patient sounds very sick or weak to the triager    Negative: MODERATE vaginal bleeding (i.e., soaking 1 pad or tampon per hour and present > 6 hours)    Negative: [1] Constant abdominal pain AND [2] present > 2 hours    Negative: Pale skin (pallor) of new onset or worsening    Protocols used: POSTPARTUM - VAGINAL BLEEDING AND LOCHIA-A-AH

## 2022-03-21 ENCOUNTER — PRENATAL OFFICE VISIT (OUTPATIENT)
Dept: MIDWIFE SERVICES | Facility: CLINIC | Age: 27
End: 2022-03-21
Payer: COMMERCIAL

## 2022-03-21 VITALS
TEMPERATURE: 98 F | DIASTOLIC BLOOD PRESSURE: 60 MMHG | BODY MASS INDEX: 38.45 KG/M2 | OXYGEN SATURATION: 97 % | SYSTOLIC BLOOD PRESSURE: 116 MMHG | HEART RATE: 89 BPM | WEIGHT: 224 LBS

## 2022-03-21 DIAGNOSIS — Z11.51 SPECIAL SCREENING EXAMINATION FOR HUMAN PAPILLOMAVIRUS (HPV): Primary | ICD-10-CM

## 2022-03-21 DIAGNOSIS — R87.612 PAPANICOLAOU SMEAR OF CERVIX WITH LOW GRADE SQUAMOUS INTRAEPITHELIAL LESION (LGSIL): ICD-10-CM

## 2022-03-21 PROCEDURE — 99207 PR POST PARTUM EXAM: CPT | Performed by: ADVANCED PRACTICE MIDWIFE

## 2022-03-21 PROCEDURE — G0145 SCR C/V CYTO,THINLAYER,RESCR: HCPCS | Performed by: ADVANCED PRACTICE MIDWIFE

## 2022-03-21 ASSESSMENT — PATIENT HEALTH QUESTIONNAIRE - PHQ9: SUM OF ALL RESPONSES TO PHQ QUESTIONS 1-9: 10

## 2022-03-21 NOTE — PROGRESS NOTES
SUBJECTIVE:   Alecia GIBSON Pauser is here for her 6-week postpartum checkup.     HPI: Alecia is here with her daughter for PP visit. She is feeling well. Feels that her adjustment to motherhood has been good. She still has some bleeding. Passed a larger clot a few weeks ago, but it improved. Then she went out for [employer] [place], and the bleeding has been a bit increased since. She is exclusively breastfeeding/pumping and bottling. Breasts get sore, but no fevers or signs of mastitis. Her mood is good. Verbalizes some anxiety about her and her daughter's wellness, but feels she is coping well. Has good support at home.     DELIVERY DATE: 22   of a viable girl, weight 7 pounds 3.7oz oz., with Preeclampsia complications.  FEEDING METHOD: and Bottlefed    CONTRACEPTION PLANNED: condoms  She  has not had intercourse since delivery.  HX OF DEPRESSION:No; sometimes has anxiety   HX OF ABUSE:No  OTHER HPI: She has no signs of infection, bleeding or other complications. Bleeding stopped, epis/lac healing well.         EXAM:  /60 (BP Location: Left arm, Patient Position: Left side, Cuff Size: Adult Large)   Pulse 89   Temp 98  F (36.7  C)   Wt 101.6 kg (224 lb)   LMP 2021   SpO2 97%   Breastfeeding Yes   BMI 38.45 kg/m    GENERAL APPEARANCE: healthy, alert and no distress  NECK: thyroid normal to palpation  RESP: lungs clear to auscultation - no rales, rhonchi or wheezes  ABDOMEN: soft, nontender, diastasis recti closing, transverse incision well approximated/healed  PSYCH: mentation appears normal and affect normal/bright  PELVIC EXAM:  Vulva: BUS WNL, no lesions noted  Vagina: Discharge bloody, no lesions, well rugated, good tone,   Cervix: Smooth, pink, no visible lesions, neg CMT    ASSESSMENT:   Normal postpartum exam after c/s for FTP.  (Z11.51) Special screening examination for human papillomavirus (HPV)  (primary encounter diagnosis)  Plan: Pap imaged thin layer screen reflex to  HPV if         ASCUS - recommend age 25 - 29, HPV Hold (Lab         Only)    (R87.022) Papanicolaou smear of cervix with low grade squamous intraepithelial lesion (LGSIL)  Plan: Pap imaged thin layer screen reflex to HPV if         ASCUS - recommend age 25 - 29, HPV Hold (Lab         Only)    PLAN:  Birth Control as ordered. Fertility reviewed.   Return as needed or at time interval for next routine pap, pelvic, or breast exam.  Encourage Kegals and abdominal exercise. Slow, steady weight loss.  Continue a multi vitamin supplement, especially if breastfeeding.  Pap smear was obtained.  GC/CHLAMYDIA CULTURE OBTAINED:NO   Post partum Hgb was not obtained.    Arsenio Moser CNM

## 2022-03-23 LAB
BKR LAB AP GYN ADEQUACY: NORMAL
BKR LAB AP GYN INTERPRETATION: NORMAL
BKR LAB AP HPV REFLEX: NORMAL
BKR LAB AP PREVIOUS ABNORMAL: NORMAL
PATH REPORT.COMMENTS IMP SPEC: NORMAL
PATH REPORT.COMMENTS IMP SPEC: NORMAL
PATH REPORT.RELEVANT HX SPEC: NORMAL

## 2022-07-22 ENCOUNTER — NURSE TRIAGE (OUTPATIENT)
Dept: NURSING | Facility: CLINIC | Age: 27
End: 2022-07-22

## 2022-07-22 NOTE — TELEPHONE ENCOUNTER
"S: Adderall refill  B: Patient calling to get her Adderall refilled.  Gave birth about 5 months ago.  Has not been taking Adderall while pregnant and for the last 5 months due to breast feeding.  She would \"like to feel better..\"    While at the pediatrician with baby she inquired with this provider if she could restart Adderall and breast feed.  Provider said it would be fine at a lower dose Adderall 10 mg XR.  Prior top getting pregnant was taking Adderall 30 mg XR.    Was seeing Deepti Swan CNP, she is not longer working at the [place] clinic.  Has an appointment on 8/17 with new provider  Dylon.  Patient is wondering if she could get a 1 month supply of Adderall vic  lower dose.     A: Writer will send message to provider.    R: Patient would like RX sent to [facility] in [place] on  [address]    April Tom RN, [facility] Triage Nurse Advisor    Reason for Disposition    [1] Caller requesting a NON-URGENT new prescription or refill AND [2] triager unable to refill per unit policy    Protocols used: MEDICATION QUESTION CALL-A-AH      "

## 2022-07-22 NOTE — LETTER
July 27, 2022      Alecia GIBSON Pauser  [id number] [facility] [address]        Dear Alecia,     We have been trying to reach you regarding your recent refill request for Adderall. We were unable to reach you on three separate occasions. Current recommendations for Adderall to breastfeeding patients is contraindicated. If you are still interested in taking this, please see a psychiatrist first and if see if they approve.    If you have any questions regarding the above, please call the [facility] number [phone].      Sincerely,        [address] [address]  Phone: [phone]

## 2022-07-23 NOTE — TELEPHONE ENCOUNTER
She needs to be seen by a provider before being prescribed a controlled substance.    Also, I do not have any experience prescribing Adderall to breastfeeding patients, and the current recommendations I have available say it is contraindicated.  So I would not feel comfortable prescribing this to a breastfeeding patient unless they met with a psychiatrist first and they approved.

## 2022-07-25 NOTE — TELEPHONE ENCOUNTER
RN attempted to contact patient, but no answer. Left message on patient's voice mail to call clinic back.    Patient has an appointment scheduled on 8/17/22 with Polina CAMARENA.  Patient will need to see psychiatry for Adderall medication.  See message below.    Zoila Gaspar RN  [facility]      She needs to be seen by a provider before being prescribed a controlled substance.    Also, I do not have any experience prescribing Adderall to breastfeeding patients, and the current recommendations I have available say it is contraindicated.  So I would not feel comfortable prescribing this to a breastfeeding patient unless they met with a psychiatrist first and they approved.

## 2022-07-26 NOTE — TELEPHONE ENCOUNTER
RN attempt #2 to call patient regarding message below. No answer. Left VM with call back number.     If patient calls back, please relay Polina's message below to patient. Thanks.    RONI GoodwinN, RN   [facility]

## 2022-07-27 NOTE — TELEPHONE ENCOUNTER
RN attempt #3 to call patient regarding message below. No answer. Left VM with call back number.      If patient calls back, please relay Polina's message below to patient.     Third attempt in trying to reach pt. Letter will be sent.     Closing encounter.     RONI GoodwinN, RN              [facility]

## 2022-08-17 ENCOUNTER — OFFICE VISIT (OUTPATIENT)
Dept: FAMILY MEDICINE | Facility: CLINIC | Age: 27
End: 2022-08-17
Payer: COMMERCIAL

## 2022-08-17 VITALS
TEMPERATURE: 98.4 F | SYSTOLIC BLOOD PRESSURE: 114 MMHG | BODY MASS INDEX: 39.65 KG/M2 | HEART RATE: 87 BPM | WEIGHT: 231 LBS | DIASTOLIC BLOOD PRESSURE: 79 MMHG | RESPIRATION RATE: 20 BRPM

## 2022-08-17 DIAGNOSIS — F90.9 ATTENTION DEFICIT HYPERACTIVITY DISORDER (ADHD), UNSPECIFIED ADHD TYPE: Primary | ICD-10-CM

## 2022-08-17 DIAGNOSIS — Z78.9 BREASTFEEDING (INFANT): ICD-10-CM

## 2022-08-17 PROBLEM — Z34.90 ENCOUNTER FOR INDUCTION OF LABOR: Status: RESOLVED | Noted: 2022-02-01 | Resolved: 2022-08-17

## 2022-08-17 PROBLEM — Z23 NEED FOR TDAP VACCINATION: Status: RESOLVED | Noted: 2021-07-06 | Resolved: 2022-08-17

## 2022-08-17 PROCEDURE — 99213 OFFICE O/P EST LOW 20 MIN: CPT | Performed by: PHYSICIAN ASSISTANT

## 2022-08-17 ASSESSMENT — ANXIETY QUESTIONNAIRES
IF YOU CHECKED OFF ANY PROBLEMS ON THIS QUESTIONNAIRE, HOW DIFFICULT HAVE THESE PROBLEMS MADE IT FOR YOU TO DO YOUR WORK, TAKE CARE OF THINGS AT HOME, OR GET ALONG WITH OTHER PEOPLE: SOMEWHAT DIFFICULT
1. FEELING NERVOUS, ANXIOUS, OR ON EDGE: SEVERAL DAYS
GAD7 TOTAL SCORE: 8
4. TROUBLE RELAXING: MORE THAN HALF THE DAYS
7. FEELING AFRAID AS IF SOMETHING AWFUL MIGHT HAPPEN: NOT AT ALL
3. WORRYING TOO MUCH ABOUT DIFFERENT THINGS: MORE THAN HALF THE DAYS
2. NOT BEING ABLE TO STOP OR CONTROL WORRYING: SEVERAL DAYS
8. IF YOU CHECKED OFF ANY PROBLEMS, HOW DIFFICULT HAVE THESE MADE IT FOR YOU TO DO YOUR WORK, TAKE CARE OF THINGS AT HOME, OR GET ALONG WITH OTHER PEOPLE?: SOMEWHAT DIFFICULT
7. FEELING AFRAID AS IF SOMETHING AWFUL MIGHT HAPPEN: NOT AT ALL
GAD7 TOTAL SCORE: 8
6. BECOMING EASILY ANNOYED OR IRRITABLE: MORE THAN HALF THE DAYS
5. BEING SO RESTLESS THAT IT IS HARD TO SIT STILL: NOT AT ALL
GAD7 TOTAL SCORE: 8

## 2022-08-17 ASSESSMENT — ASTHMA QUESTIONNAIRES
ACT_TOTALSCORE: 25
ACT_TOTALSCORE: 25
QUESTION_4 LAST FOUR WEEKS HOW OFTEN HAVE YOU USED YOUR RESCUE INHALER OR NEBULIZER MEDICATION (SUCH AS ALBUTEROL): NOT AT ALL
QUESTION_3 LAST FOUR WEEKS HOW OFTEN DID YOUR ASTHMA SYMPTOMS (WHEEZING, COUGHING, SHORTNESS OF BREATH, CHEST TIGHTNESS OR PAIN) WAKE YOU UP AT NIGHT OR EARLIER THAN USUAL IN THE MORNING: NOT AT ALL
QUESTION_5 LAST FOUR WEEKS HOW WOULD YOU RATE YOUR ASTHMA CONTROL: COMPLETELY CONTROLLED
QUESTION_1 LAST FOUR WEEKS HOW MUCH OF THE TIME DID YOUR ASTHMA KEEP YOU FROM GETTING AS MUCH DONE AT WORK, SCHOOL OR AT HOME: NONE OF THE TIME
QUESTION_2 LAST FOUR WEEKS HOW OFTEN HAVE YOU HAD SHORTNESS OF BREATH: NOT AT ALL

## 2022-08-17 NOTE — PROGRESS NOTES
Subjective:    Alecia GIBSON Pauser is a 27 year old female who presents with chief complaint of wanting ADHD medication refill.  New patient.  I was sent a phone message 1 month ago that this patient wanted to refill her Adderall, and breast-feeding 5-month-old baby.  Had not been taking Adderall during pregnancy or during breast-feeding yet.  She was hoping to get a refill before she was seen.  My response was that she would need to be seen first, as she had never been seen at our clinic before.  Additionally, that I did not have any experience prescribing Adderall to breastfeeding patients, and the recommendations I could find did not fully endorse this.  Therefore I would not be prescribing that for her, would encourage her to see a specialist.  In reviewing her chart, it looks like possibly she did not answer her phone, or get this message.    I reviewed this with her again today.    She is upset, as she would like to restart this medicine now.  Her child's pediatrician told her it should be okay to take Adderall while breastfeeding, at a lower dose.      Patient Active Problem List   Diagnosis     Exercise-induced asthma     H/O: depression     Attention deficit hyperactivity disorder     Anxiety     Supervision of normal first pregnancy, antepartum       Current Outpatient Medications:      ADDERALL XR 30 MG 24 hr capsule, , Disp: , Rfl:      calcium carbonate (TUMS) 500 MG chewable tablet, Take 1 chew tab by mouth 2 times daily as needed for heartburn (Patient not taking: Reported on 8/17/2022), Disp: , Rfl:      ibuprofen (ADVIL/MOTRIN) 600 MG tablet, Take 1 tablet (600 mg) by mouth every 6 hours as needed for moderate pain Start after delivery (Patient not taking: Reported on 8/17/2022), Disp: 60 tablet, Rfl: 0     Prenatal MV-Min-Fe Fum-FA-DHA (PRENATAL+DHA PO), , Disp: , Rfl:       Objective:   Allergies:  Seasonal allergies    Vitals:  Vitals:    08/17/22 0951   BP: 114/79   BP Location: Left arm    Patient Position: Sitting   Cuff Size: Adult Large   Pulse: 87   Resp: 20   Temp: 98.4  F (36.9  C)   Weight: 104.8 kg (231 lb)       Body mass index is 39.65 kg/m .    Vital signs reviewed.  General: Patient is alert and oriented x 3, in no apparent distress, appropriately groomed with normal affect      Assessment and Plan:   1. Attention deficit hyperactivity disorder (ADHD), unspecified ADHD type  New patient to our clinic.  History of ADHD.  Was taking Adderall 30 mg daily.  Currently breast-feeding 6-month-old baby.  See HPI for details.  She was hoping to restart Adderall at a lower dose while she is breastfeeding.  I reviewed with her that I would not feel comfortable prescribing Adderall to a patient who is breastfeeding, given there is not much data to support the safety of this.  I reviewed most recent Up-to-Date recommendations.  I also consulted with our pharmacist today.  See information cited below.  I reviewed with patient that she could have a visit or consult with a psychiatrist to review this.  I also discussed that she could choose to stop breast-feeding and start this medicine.  She does not want to do either of these things.  She says she will return to her previous provider, who prescribed her this medicine in the past.  No charge for this visit.    2. Breastfeeding (infant)  Reviewed in relation to #1.      Https://www.ncbi.nlm.nih.gov/books/XPH714883/  Summary of Use during Lactation  In dosages prescribed for medical indications, some evidence indicates that amphetamine does not affect nursing infants adversely. The effect of amphetamine in milk on the neurological development of the infant has not been well studied. Large dosages of amphetamine might interfere with milk production, especially in women whose lactation is not well established. Breastfeeding is generally discouraged in mothers who are actively abusing amphetamines.[1-3] One expert recommends that amphetamine not be used  therapeutically in nursing mothers.[4]    This dictation uses voice recognition software, which may contain typographical errors.

## 2022-10-15 ENCOUNTER — HEALTH MAINTENANCE LETTER (OUTPATIENT)
Age: 27
End: 2022-10-15

## 2023-03-26 ENCOUNTER — HEALTH MAINTENANCE LETTER (OUTPATIENT)
Age: 28
End: 2023-03-26

## 2023-09-06 ENCOUNTER — TELEPHONE (OUTPATIENT)
Dept: MIDWIFE SERVICES | Facility: CLINIC | Age: 28
End: 2023-09-06
Payer: COMMERCIAL

## 2023-09-06 NOTE — TELEPHONE ENCOUNTER
Patient calling the clinic wanting an appointment.    Been feeling off, is 3 days late. Says she was drinking this weekend and felt like she was not able to get drunk. The only other time this happened was just before she found out she was pregnant last time.  Took 4 UPT at home, 2 were positive (digital, one was ) and 2 were negative (line tests). Line tests were later on in the day, not the first urination of the day.     LMP 8/10/23 was only 3 days long, usually they are 4-5 days.   Would be 3w6d, says her periods are usually every 3 weeks.   Said she had unprotected intercourse about a week and a half ago. This is not planned but is desired.  Patient is going to take another UPT tomorrow or the next morning with morning urine and call us back. Will get her set up with appointments at that time if it is positive.     VICKI De Guzman

## 2024-04-09 NOTE — TELEPHONE ENCOUNTER
Left message to call back for: Patient  Information to relay to patient:  RAMILA stating pt is a month to early for her next depo shot.  Pt's last shot was on 1/16/20 the window where pt can get her shot is 4/3/20-4/17/20.  If pt calls back please help her reschedule her appt.      No

## 2024-05-26 ENCOUNTER — HEALTH MAINTENANCE LETTER (OUTPATIENT)
Age: 29
End: 2024-05-26

## (undated) DEVICE — GLOVE PROTEXIS BLUE W/NEU-THERA 6.5  2D73EB65

## (undated) DEVICE — STOCKING SLEEVE COMPRESSION CALF MED

## (undated) DEVICE — SU MONOCRYL 4-0 PS-2 18" UND Y496G

## (undated) DEVICE — PACK C-SECTION LF PL15OTA83B

## (undated) DEVICE — GLOVE ESTEEM POWDER FREE SMT 6.5  2D72PT65

## (undated) DEVICE — ESU GROUND PAD UNIVERSAL W/O CORD

## (undated) DEVICE — STRAP KNEE/BODY 31143004

## (undated) DEVICE — SOL WATER IRRIG 1000ML BOTTLE 07139-09

## (undated) DEVICE — SU VICRYL 0 CT-1 36" J346H

## (undated) DEVICE — DRSG ABDOMINAL 07 1/2X8" 7197D

## (undated) DEVICE — ESU PENCIL W/HOLSTER

## (undated) DEVICE — PREP CHLORAPREP 26ML TINTED ORANGE  260815

## (undated) DEVICE — SOL NACL 0.9% IRRIG 1000ML BOTTLE 07138-09

## (undated) DEVICE — CATH TRAY FOLEY 16FR BARDEX W/DRAIN BAG STATLOCK 300316A

## (undated) RX ORDER — FENTANYL CITRATE 50 UG/ML
INJECTION, SOLUTION INTRAMUSCULAR; INTRAVENOUS
Status: DISPENSED
Start: 2022-02-03

## (undated) RX ORDER — MORPHINE SULFATE 1 MG/ML
INJECTION, SOLUTION EPIDURAL; INTRATHECAL; INTRAVENOUS
Status: DISPENSED
Start: 2022-02-04

## (undated) RX ORDER — OXYTOCIN/0.9 % SODIUM CHLORIDE 30/500 ML
PLASTIC BAG, INJECTION (ML) INTRAVENOUS
Status: DISPENSED
Start: 2022-02-04

## (undated) RX ORDER — ONDANSETRON 2 MG/ML
INJECTION INTRAMUSCULAR; INTRAVENOUS
Status: DISPENSED
Start: 2022-02-04

## (undated) RX ORDER — BUPIVACAINE HYDROCHLORIDE 2.5 MG/ML
INJECTION, SOLUTION EPIDURAL; INFILTRATION; INTRACAUDAL
Status: DISPENSED
Start: 2022-02-04

## (undated) RX ORDER — FENTANYL CITRATE 50 UG/ML
INJECTION, SOLUTION INTRAMUSCULAR; INTRAVENOUS
Status: DISPENSED
Start: 2022-02-04

## (undated) RX ORDER — KETOROLAC TROMETHAMINE 30 MG/ML
INJECTION, SOLUTION INTRAMUSCULAR; INTRAVENOUS
Status: DISPENSED
Start: 2022-02-04